# Patient Record
Sex: MALE | Race: BLACK OR AFRICAN AMERICAN | Employment: FULL TIME | URBAN - METROPOLITAN AREA
[De-identification: names, ages, dates, MRNs, and addresses within clinical notes are randomized per-mention and may not be internally consistent; named-entity substitution may affect disease eponyms.]

---

## 2017-01-17 ENCOUNTER — OP HISTORICAL/CONVERTED ENCOUNTER (OUTPATIENT)
Dept: OTHER | Age: 52
End: 2017-01-17

## 2017-03-21 ENCOUNTER — OFFICE VISIT (OUTPATIENT)
Dept: ENDOCRINOLOGY | Age: 52
End: 2017-03-21

## 2017-03-21 VITALS
HEART RATE: 110 BPM | BODY MASS INDEX: 26.08 KG/M2 | DIASTOLIC BLOOD PRESSURE: 83 MMHG | TEMPERATURE: 97.9 F | HEIGHT: 71 IN | WEIGHT: 186.3 LBS | RESPIRATION RATE: 16 BRPM | SYSTOLIC BLOOD PRESSURE: 123 MMHG

## 2017-03-21 DIAGNOSIS — E11.65 TYPE 2 DIABETES MELLITUS WITH HYPERGLYCEMIA, WITHOUT LONG-TERM CURRENT USE OF INSULIN (HCC): Primary | ICD-10-CM

## 2017-03-21 DIAGNOSIS — R00.0 TACHYCARDIA: ICD-10-CM

## 2017-03-21 LAB
GLUCOSE POC: 264 MG/DL
HBA1C MFR BLD HPLC: 9.1 %

## 2017-03-21 NOTE — PROGRESS NOTES
Farideh Leos is a 46 y.o. male here for   Chief Complaint   Patient presents with    Diabetes       Functional glucose monitor and record keeping system? - yes  Eye exam within last year? - yes   Foot exam within last year? - has appt in 2 weeks    Lab Results   Component Value Date/Time    Hemoglobin A1c, External 9.6 03/17/2016       Wt Readings from Last 3 Encounters:   11/04/16 187 lb (84.8 kg)   04/26/16 186 lb (84.4 kg)     Temp Readings from Last 3 Encounters:   11/04/16 97.2 °F (36.2 °C) (Oral)   04/26/16 97.5 °F (36.4 °C) (Oral)     BP Readings from Last 3 Encounters:   11/04/16 131/88   04/26/16 122/79     Pulse Readings from Last 3 Encounters:   11/04/16 99   04/26/16 90

## 2017-03-21 NOTE — PROGRESS NOTES
[facility]               Jeryl Buerger ,MD        [address] 78 [phone] Fax [phone]               Patient Information  Date:3/21/2017  Name : Belinda Fox 46 y.o.     YOB: 1965         PCP: Richard Wade MD         Chief Complaint   Patient presents with    Diabetes       History of Present Illness: Belinda Fox is a 46 y.o. male here for fu of  Type 2 Diabetes Mellitus. Self referred for evaluation and management of type 2 diabetes mellitus. He was diagnosed with diabetes in 2013,       Not checking BG   Stopped exercising   No weight gain           Wt Readings from Last 3 Encounters:   03/21/17 186 lb 4.8 oz (84.5 kg)   11/04/16 187 lb (84.8 kg)   04/26/16 186 lb (84.4 kg)       BP Readings from Last 3 Encounters:   03/21/17 123/83   11/04/16 131/88   04/26/16 122/79           Past Medical History:   Diagnosis Date    [facility]     Prostate cancer ([facility]     Type II diabetes mellitus, uncontrolled ([facility]      Current Outpatient Prescriptions   Medication Sig    metFORMIN (GLUCOPHAGE) 1,000 mg tablet Take 1 Tab by mouth two (2) times daily (with meals). Stop Glucovance    SITagliptin (JANUVIA) 100 mg tablet Take 1 Tab by mouth every morning.  efavirenz-emtrictabine-tenofovir (ATRIPLA) tablet Take 1 Tab by mouth nightly.  cinnamon bark 500 mg cap Take  by mouth.  B.infantis-B.ani-B.long-B.bifi 10-15 mg TbEC Take  by mouth.  Omega-3-DHA-EPA-Fish Oil 1,000 mg (120 mg-180 mg) cap Take  by mouth. No current facility-administered medications for this visit.       Allergies   Allergen Reactions    Azithromycin Myalgia    Sulfa (Sulfonamide Antibiotics) Nausea Only         Review of Systems:  - Constitutional Symptoms: no fevers, no chills, no weight loss  - Eyes: no blurry vision no double vision  - Cardiovascular: no chest pain ,no palpitations  - Respiratory: no cough no shortness of breath  -     Physical Examination:   Blood pressure 123/83, pulse (!) 110, temperature 97.9 °F (36.6 °C), temperature source Oral, resp. rate 16, height 5' 11\" (1.803 m), weight 186 lb 4.8 oz (84.5 kg). Estimated body mass index is 25.98 kg/(m^2) as calculated from the following:    Height as of this encounter: 5' 11\" (1.803 m). -   Weight as of this encounter: 186 lb 4.8 oz (84.5 kg). - General: pleasant, no distress, good eye contact  - HEENT: no pallor, no periorbital edema, EOMI  - Neck: supple,   - Cardiovascular: regular, normal rate, normal S1 and S2,   - Respiratory: clear to auscultation bilaterally  - Gastrointestinal: soft, nontender, nondistended,  BS +  - Neurological: alert and oriented  - Psychiatric: normal mood and affect  - Skin: color, texture, turgor normal.       Data Reviewed:     [] Glucose records reviewed. [] See glucose records for details (to be scanned). [] A1C  [] Reviewed labs    Cr nl     Assessment/Plan:     1. Type 2 diabetes mellitus with hyperglycemia, without long-term current use of insulin (HCC)        1. Type 2 Diabetes Mellitus with no nephropathy,neuropathy,retinopathy  Lab Results   Component Value Date/Time    Hemoglobin A1c (POC) 9.1 03/21/2017 03:54 PM    Hemoglobin A1c, External 9.6 03/17/2016     No signs of insulin resistance, no history of pancreatitis. Metformin 1000 mg twice daily, Januvia 100 mg.    added Jardiance         2. HIV - Followed at [facility]     3 Tachycardia - asymptomatic         There are no Patient Instructions on file for this visit. Follow-up Disposition: Not on File    Thank you for allowing me to participate in the care of this patient.     Renie Hatchet, MD      Patient verbalized understanding

## 2017-03-21 NOTE — MR AVS SNAPSHOT
Visit Information Date & Time Provider Department Dept. Phone Encounter #  
 3/21/2017  3:30 PM Lauren Mackey MD [facility] [phone] [id number] Follow-up Instructions Return in about 3 months (around 6/21/2017). Upcoming Health Maintenance Date Due Hepatitis C Screening 1965 FOOT EXAM Q1 4/7/1973 MICROALBUMIN Q1 4/7/1973 EYE EXAM RETINAL OR DILATED Q1 4/7/1973 Pneumococcal 19-64 Highest Risk (1 of 3 - PCV13) 4/7/1982 DTaP/Tdap/Td series (1 - Tdap) 4/7/1984 FOBT Q 1 YEAR AGE 50-75 4/7/2013 INFLUENZA AGE 9 TO ADULT 8/1/2016 HEMOGLOBIN A1C Q6M 9/17/2016 LIPID PANEL Q1 10/28/2017 Allergies as of 3/21/2017  Review Complete On: 3/21/2017 By: Lauren Mackey MD  
  
 Severity Noted Reaction Type Reactions Azithromycin  04/26/2016    Myalgia Sulfa (Sulfonamide Antibiotics)  04/26/2016    Nausea Only Current Immunizations  Never Reviewed No immunizations on file. Not reviewed this visit You Were Diagnosed With   
  
 Codes Comments Type 2 diabetes mellitus with hyperglycemia, without long-term current use of insulin (HCC)    -  Primary ICD-10-CM: E11.65 ICD-9-CM: 250.00, 790.29 Vitals BP Pulse Temp Resp Height(growth percentile) Weight(growth percentile) 123/83 (BP 1 Location: Right arm, BP Patient Position: Sitting) (!) 110 97.9 °F (36.6 °C) (Oral) 16 5' 11\" (1.803 m) 186 lb 4.8 oz (84.5 kg) BMI Smoking Status 25.98 kg/m2 Never Smoker BMI and BSA Data Body Mass Index Body Surface Area  
 25.98 kg/m 2 2.06 m 2 Preferred Pharmacy Pharmacy Name Phone [id number] [address] AT [place] [address] [phone] Your Updated Medication List  
  
   
This list is accurate as of: 3/21/17  4:14 PM.  Always use your most recent med list.  
  
  
  
  
 B.infantis-B.ani-B.long-B.bifi 10-15 mg Tbec Take  by mouth.  
  
 cinnamon bark 500 mg Cap Take  by mouth.  
  
 efavirenz-emtrictabine-tenofovir tablet Commonly known as:  ATRIPLA Take 1 Tab by mouth nightly. metFORMIN 1,000 mg tablet Commonly known as:  GLUCOPHAGE Take 1 Tab by mouth two (2) times daily (with meals). Stop Glucovance Omega-3-DHA-EPA-Fish Oil 1,000 mg (120 mg-180 mg) Cap Take  by mouth. SITagliptin 100 mg tablet Commonly known as:  Thirza Calkin Take 1 Tab by mouth every morning. We Performed the Following AMB POC GLUCOSE, QUANTITATIVE, BLOOD [24000 CPT(R)] AMB POC HEMOGLOBIN A1C [53462 CPT(R)] Follow-up Instructions Return in about 3 months (around 6/21/2017). Introducing \[facility] [place] [facility]\"" & HEALTH SERVICES! Víctor Lloyd introduces [employer] patient portal. Now you can access parts of your medical record, email your doctor's office, and request medication refills online. 1. In your internet browser, go to [email or web address] 2. Click on the First Time User? Click Here link in the Sign In box. You will see the New Member Sign Up page. 3. Enter your [employer] Access Code exactly as it appears below. You will not need to use this code after youve completed the sign-up process. If you do not sign up before the expiration date, you must request a new code. · Ele[employer] Access Code: [email or web address] Expires: 6/19/2017  4:14 PM 
 
4. Enter the last four digits of your Social Security Number (xxxx) and Date of Birth (mm/dd/yyyy) as indicated and click Submit. You will be taken to the next sign-up page. 5. Create a BeInSynct ID. This will be your Elecar login ID and cannot be changed, so think of one that is secure and easy to remember. 6. Create a [employer] password. You can change your password at any time. 7. Enter your Password Reset Question and Answer. This can be used at a later time if you forget your password. 8. Enter your e-mail address. You will receive e-mail notification when new information is available in [address] 9. Click Sign Up. You can now view and download portions of your medical record. 10. Click the Download Summary menu link to download a portable copy of your medical information. If you have questions, please visit the Frequently Asked Questions section of the [employer] website. Remember, [employer] is NOT to be used for urgent needs. For medical emergencies, dial 911. Now available from your iPhone and Android! Please provide this summary of care documentation to your next provider. Your primary care clinician is listed as Mercedes Don. If you have any questions after today's visit, please call [phone].

## 2017-04-22 ENCOUNTER — ED HISTORICAL/CONVERTED ENCOUNTER (OUTPATIENT)
Dept: OTHER | Age: 52
End: 2017-04-22

## 2017-05-04 DIAGNOSIS — E11.65 TYPE 2 DIABETES MELLITUS WITH HYPERGLYCEMIA, WITHOUT LONG-TERM CURRENT USE OF INSULIN (HCC): ICD-10-CM

## 2017-05-08 DIAGNOSIS — E11.65 UNCONTROLLED TYPE 2 DIABETES MELLITUS WITH HYPERGLYCEMIA, WITHOUT LONG-TERM CURRENT USE OF INSULIN (HCC): ICD-10-CM

## 2017-05-08 RX ORDER — METFORMIN HYDROCHLORIDE 1000 MG/1
1000 TABLET ORAL 2 TIMES DAILY WITH MEALS
Qty: 180 TAB | Refills: 3 | Status: SHIPPED | OUTPATIENT
Start: 2017-05-08 | End: 2018-02-12 | Stop reason: ALTCHOICE

## 2017-05-30 DIAGNOSIS — E11.65 TYPE 2 DIABETES MELLITUS WITH HYPERGLYCEMIA, WITHOUT LONG-TERM CURRENT USE OF INSULIN (HCC): ICD-10-CM

## 2017-06-20 ENCOUNTER — OP HISTORICAL/CONVERTED ENCOUNTER (OUTPATIENT)
Dept: OTHER | Age: 52
End: 2017-06-20

## 2017-06-20 LAB — CREATININE, EXTERNAL: 0.92

## 2017-06-23 ENCOUNTER — OFFICE VISIT (OUTPATIENT)
Dept: ENDOCRINOLOGY | Age: 52
End: 2017-06-23

## 2017-06-23 VITALS
WEIGHT: 181 LBS | DIASTOLIC BLOOD PRESSURE: 75 MMHG | RESPIRATION RATE: 16 BRPM | OXYGEN SATURATION: 98 % | HEART RATE: 89 BPM | HEIGHT: 71 IN | SYSTOLIC BLOOD PRESSURE: 114 MMHG | BODY MASS INDEX: 25.34 KG/M2

## 2017-06-23 DIAGNOSIS — I10 ESSENTIAL HYPERTENSION: ICD-10-CM

## 2017-06-23 DIAGNOSIS — B20 HIV (HUMAN IMMUNODEFICIENCY VIRUS INFECTION) (HCC): ICD-10-CM

## 2017-06-23 DIAGNOSIS — E11.65 TYPE 2 DIABETES MELLITUS WITH HYPERGLYCEMIA, WITHOUT LONG-TERM CURRENT USE OF INSULIN (HCC): Primary | ICD-10-CM

## 2017-06-23 NOTE — MR AVS SNAPSHOT
Visit Information Date & Time Provider Department Dept. Phone Encounter #  
 6/23/2017  3:30 PM Fariha Mgauire MD [facility] [phone] [id number] Follow-up Instructions Return in about 3 months (around 9/23/2017). Upcoming Health Maintenance Date Due Hepatitis C Screening 1965 FOOT EXAM Q1 4/7/1973 MICROALBUMIN Q1 4/7/1973 EYE EXAM RETINAL OR DILATED Q1 4/7/1973 Pneumococcal 19-64 Highest Risk (1 of 3 - PCV13) 4/7/1982 DTaP/Tdap/Td series (1 - Tdap) 4/7/1984 FOBT Q 1 YEAR AGE 50-75 4/7/2013 INFLUENZA AGE 9 TO ADULT 8/1/2017 HEMOGLOBIN A1C Q6M 9/21/2017 LIPID PANEL Q1 10/28/2017 Allergies as of 6/23/2017  Review Complete On: 6/23/2017 By: Fariha Maguire MD  
  
 Severity Noted Reaction Type Reactions Azithromycin  04/26/2016    Myalgia Sulfa (Sulfonamide Antibiotics)  04/26/2016    Nausea Only Current Immunizations  Never Reviewed No immunizations on file. Not reviewed this visit You Were Diagnosed With   
  
 Codes Comments Type 2 diabetes mellitus with hyperglycemia, without long-term current use of insulin (HCC)    -  Primary ICD-10-CM: E11.65 ICD-9-CM: 250.00, 790.29 Vitals Height(growth percentile) Weight(growth percentile) BMI Smoking Status 5' 11\" (1.803 m) 181 lb (82.1 kg) 25.24 kg/m2 Never Smoker BMI and BSA Data Body Mass Index Body Surface Area  
 25.24 kg/m 2 2.03 m 2 Preferred Pharmacy Pharmacy Name Phone [id number] [address] AT [place] [address] [phone] Your Updated Medication List  
  
   
This list is accurate as of: 6/23/17  4:30 PM.  Always use your most recent med list.  
  
  
  
  
 B.infantis-B.ani-B.long-B.bifi 10-15 mg Tbec Take  by mouth.  
  
 cinnamon bark 500 mg Cap Take  by mouth.  
  
 efavirenz-emtrictabine-tenofovir tablet Commonly known as:  ATRIPLA Take 1 Tab by mouth nightly. empagliflozin 25 mg tablet Commonly known as:  Josphine Alan Take 1 Tab by mouth daily. metFORMIN 1,000 mg tablet Commonly known as:  GLUCOPHAGE Take 1 Tab by mouth two (2) times daily (with meals). Stop Glucovance Omega-3-DHA-EPA-Fish Oil 1,000 mg (120 mg-180 mg) Cap Take  by mouth. SITagliptin 100 mg tablet Commonly known as:  Trina Montalvo Take 1 Tab by mouth every morning. Follow-up Instructions Return in about 3 months (around 9/23/2017). To-Do List   
 09/01/2017 Lab:  C-PEPTIDE   
  
 09/01/2017 Lab:  GLUTAMIC ACID DECARB AB   
  
 09/01/2017 Lab:  HEMOGLOBIN A1C WITH EAG   
  
 09/01/2017 Lab:  LDL, DIRECT   
  
 09/01/2017 Lab:  METABOLIC PANEL, BASIC Introducing [facility] & HEALTH SERVICES! [facility] introduces Any[employer]ots patient portal. Now you can access parts of your medical record, email your doctor's office, and request medication refills online. 1. In your internet browser, go to [email or web address] 2. Click on the First Time User? Click Here link in the Sign In box. You will see the New Member Sign Up page. 3. Enter your Any[employer]ots Access Code exactly as it appears below. You will not need to use this code after youve completed the sign-up process. If you do not sign up before the expiration date, you must request a new code. · Any[employer]ots Access Code: [email or web address] Expires: 9/21/2017  4:29 PM 
 
4. Enter the last four digits of your Social Security Number (xxxx) and Date of Birth (mm/dd/yyyy) as indicated and click Submit. You will be taken to the next sign-up page. 5. Create a [employer] ID. This will be your Anybots login ID and cannot be changed, so think of one that is secure and easy to remember. 6. Create a Anybots password. You can change your password at any time. 7. Enter your Password Reset Question and Answer.  This can be used at a later time if you forget your password. 8. Enter your e-mail address. You will receive e-mail notification when new information is available in [address] 9. Click Sign Up. You can now view and download portions of your medical record. 10. Click the Download Summary menu link to download a portable copy of your medical information. If you have questions, please visit the Frequently Asked Questions section of the [employer] website. Remember, [employer] is NOT to be used for urgent needs. For medical emergencies, dial 911. Now available from your iPhone and Android! Please provide this summary of care documentation to your next provider. If you have any questions after today's visit, please call [phone].

## 2017-06-23 NOTE — PROGRESS NOTES
Eye exam: within last year  Foot exam: within last year    Lab Results   Component Value Date/Time    Hemoglobin A1c (POC) 9.1 03/21/2017 03:54 PM    Hemoglobin A1c, External 9.6 03/17/2016     Wt Readings from Last 3 Encounters:   06/23/17 181 lb (82.1 kg)   03/21/17 186 lb 4.8 oz (84.5 kg)   11/04/16 187 lb (84.8 kg)     Temp Readings from Last 3 Encounters:   03/21/17 97.9 °F (36.6 °C) (Oral)   11/04/16 97.2 °F (36.2 °C) (Oral)   04/26/16 97.5 °F (36.4 °C) (Oral)     BP Readings from Last 3 Encounters:   06/23/17 114/75   03/21/17 123/83   11/04/16 131/88     Pulse Readings from Last 3 Encounters:   06/23/17 89   03/21/17 (!) 110   11/04/16 99     Order placed for pt,  per Verbal Order with read back from Dr Yogesh Tom 6/23/2017

## 2017-06-23 NOTE — PROGRESS NOTES
[facility]               Yasmin Melendez MD        [address] 78 [phone] Fax [phone]               Patient Information  Date:6/23/2017  Name : Rachel Persaud 45 y.o.     YOB: 1965         PCP: No primary care provider on file. Chief Complaint   Patient presents with    Diabetes       History of Present Illness: Rachel Persaud is a 45 y.o. male here for fu of  Type 2 Diabetes Mellitus. Self referred for evaluation and management of type 2 diabetes mellitus. He was diagnosed with diabetes in 2013,   no log   Jardiance has helped     Not checking BG   Stopped exercising   Lost weight         Wt Readings from Last 3 Encounters:   06/23/17 181 lb (82.1 kg)   03/21/17 186 lb 4.8 oz (84.5 kg)   11/04/16 187 lb (84.8 kg)       BP Readings from Last 3 Encounters:   06/23/17 114/75   03/21/17 123/83   11/04/16 131/88           Past Medical History:   Diagnosis Date    [facility]human [facility] virus infection) ([facility]     Prostate cancer ([facility]     Type II diabetes mellitus, uncontrolled ([facility]      Current Outpatient Prescriptions   Medication Sig    empagliflozin (JARDIANCE) 25 mg tablet Take 1 Tab by mouth daily.  metFORMIN (GLUCOPHAGE) 1,000 mg tablet Take 1 Tab by mouth two (2) times daily (with meals). Stop Glucovance    SITagliptin (JANUVIA) 100 mg tablet Take 1 Tab by mouth every morning.  efavirenz-emtrictabine-tenofovir (ATRIPLA) tablet Take 1 Tab by mouth nightly.  cinnamon bark 500 mg cap Take  by mouth.  B.infantis-B.ani-B.long-B.bifi 10-15 mg TbEC Take  by mouth.  Omega-3-DHA-EPA-Fish Oil 1,000 mg (120 mg-180 mg) cap Take  by mouth. No current facility-administered medications for this visit.       Allergies   Allergen Reactions    Azithromycin Myalgia    Sulfa (Sulfonamide Antibiotics) Nausea Only         Review of Systems:  - Constitutional Symptoms: no fevers, no chills, no weight loss  - Eyes: no blurry vision no double vision  - Cardiovascular: no chest pain ,no palpitations  - Respiratory: no cough no shortness of breath  -     Physical Examination:   Blood pressure 114/75, pulse 89, resp. rate 16, height 5' 11\" (1.803 m), weight 181 lb (82.1 kg), SpO2 98 %. Estimated body mass index is 25.24 kg/(m^2) as calculated from the following:    Height as of this encounter: 5' 11\" (1.803 m). -   Weight as of this encounter: 181 lb (82.1 kg). - General: pleasant, no distress, good eye contact  - HEENT: no pallor, no periorbital edema, EOMI  - Neck: supple,   - Cardiovascular: regular, normal rate, normal S1 and S2,   - Respiratory: clear to auscultation bilaterally  - Gastrointestinal: soft, nontender, nondistended,  BS +  - Neurological: alert and oriented  - Psychiatric: normal mood and affect  - Skin: color, texture, turgor normal.       Data Reviewed:     [] Glucose records reviewed. [] See glucose records for details (to be scanned). [] A1C  [] Reviewed labs    Cr nl     Assessment/Plan:     1. Type 2 diabetes mellitus with hyperglycemia, without long-term current use of insulin ([facility]    2. [facility]    3. Essential hypertension        1. Type 2 Diabetes Mellitus with no nephropathy,neuropathy,retinopathy  Lab Results   Component Value Date/Time    Hemoglobin A1c (POC) 9.1 03/21/2017 03:54 PM    Hemoglobin A1c, External 9.6 03/17/2016     No signs of insulin resistance, no history of pancreatitis. Metformin 1000 mg twice daily, Januvia 100 mg.    added Jardiance         2. HIV - Followed at [facility]     3 HTN - controlled         There are no Patient Instructions on file for this visit. Follow-up Disposition:  Return in about 3 months (around 9/23/2017). Thank you for allowing me to participate in the care of this patient.     Alexis Bee MD      Patient verbalized understanding

## 2017-08-25 ENCOUNTER — OP HISTORICAL/CONVERTED ENCOUNTER (OUTPATIENT)
Dept: OTHER | Age: 52
End: 2017-08-25

## 2017-09-13 ENCOUNTER — OP HISTORICAL/CONVERTED ENCOUNTER (OUTPATIENT)
Dept: OTHER | Age: 52
End: 2017-09-13

## 2017-09-18 ENCOUNTER — IP HISTORICAL/CONVERTED ENCOUNTER (OUTPATIENT)
Dept: OTHER | Age: 52
End: 2017-09-18

## 2017-09-29 ENCOUNTER — IP HISTORICAL/CONVERTED ENCOUNTER (OUTPATIENT)
Dept: OTHER | Age: 52
End: 2017-09-29

## 2017-10-10 ENCOUNTER — OP HISTORICAL/CONVERTED ENCOUNTER (OUTPATIENT)
Dept: OTHER | Age: 52
End: 2017-10-10

## 2017-10-10 ENCOUNTER — OFFICE VISIT (OUTPATIENT)
Dept: ENDOCRINOLOGY | Age: 52
End: 2017-10-10

## 2017-10-10 VITALS
SYSTOLIC BLOOD PRESSURE: 112 MMHG | TEMPERATURE: 97 F | DIASTOLIC BLOOD PRESSURE: 78 MMHG | WEIGHT: 172.2 LBS | OXYGEN SATURATION: 97 % | HEART RATE: 116 BPM | BODY MASS INDEX: 24.11 KG/M2 | RESPIRATION RATE: 16 BRPM | HEIGHT: 71 IN

## 2017-10-10 DIAGNOSIS — E11.65 TYPE 2 DIABETES MELLITUS WITH HYPERGLYCEMIA, WITHOUT LONG-TERM CURRENT USE OF INSULIN (HCC): Primary | ICD-10-CM

## 2017-10-10 DIAGNOSIS — E78.5 DYSLIPIDEMIA: ICD-10-CM

## 2017-10-10 DIAGNOSIS — I10 ESSENTIAL HYPERTENSION: ICD-10-CM

## 2017-10-10 LAB
GLUCOSE POC: 156 MG/DL
HBA1C MFR BLD HPLC: 7.1 %

## 2017-10-10 RX ORDER — ATORVASTATIN CALCIUM 10 MG/1
TABLET, FILM COATED ORAL
Refills: 5 | COMMUNITY
Start: 2017-09-07 | End: 2017-10-13 | Stop reason: DRUGHIGH

## 2017-10-10 RX ORDER — ASPIRIN 325 MG
81 TABLET ORAL DAILY
COMMUNITY

## 2017-10-10 NOTE — PROGRESS NOTES
[facility]               Caitlyn Pina MD        [address] [phone] Fax [phone]               Patient Information  Date:10/10/2017  Name : Uriel Vickers 45 y.o.     YOB: 1965         PCP: Marcelino Souza MD         Chief Complaint   Patient presents with    Diabetes       History of Present Illness: Uriel Vickers is a 45 y.o. male here for fu of  Type 2 Diabetes Mellitus. Self referred for evaluation and management of type 2 diabetes mellitus. He was diagnosed with diabetes in 2013,   no log     Lost weight - has stopped Najera on his own   Ancil Aj has helped             Wt Readings from Last 3 Encounters:   10/10/17 172 lb 3.2 oz (78.1 kg)   06/23/17 181 lb (82.1 kg)   03/21/17 186 lb 4.8 oz (84.5 kg)       BP Readings from Last 3 Encounters:   10/10/17 112/78   06/23/17 114/75   03/21/17 123/83           Past Medical History:   Diagnosis Date    [facility]) [facility]     Prostate cancer ([facility]     Type II diabetes mellitus, uncontrolled ([facility]      Current Outpatient Prescriptions   Medication Sig    atorvastatin (LIPITOR) 10 mg tablet TAKE 1 TABLET BY MOUTH EVERY DAY    aspirin (ASPIRIN) 325 mg tablet Take 325 mg by mouth daily.  ertapenem [facility]) 1 gram solr injection 1 g by IntraMUSCular route every twenty-four (24) hours.  empagliflozin (JARDIANCE) 25 mg tablet Take 1 Tab by mouth daily.  metFORMIN (GLUCOPHAGE) 1,000 mg tablet Take 1 Tab by mouth two (2) times daily (with meals). Stop Glucovance    efavirenz-emtrictabine-tenofovir (ATRIPLA) tablet Take 1 Tab by mouth nightly.  SITagliptin (JANUVIA) 100 mg tablet Take 1 Tab by mouth every morning.  cinnamon bark 500 mg cap Take  by mouth.  B.infantis-B.ani-B.long-B.bifi 10-15 mg TbEC Take  by mouth.  Omega-3-DHA-EPA-Fish Oil 1,000 mg (120 mg-180 mg) cap Take  by mouth.      No current facility-administered medications for this visit. Allergies   Allergen Reactions    Azithromycin Myalgia    Sulfa (Sulfonamide Antibiotics) Nausea Only         Review of Systems:  - Constitutional Symptoms: no fevers, no chills, no weight loss  - Eyes: no blurry vision no double vision  - Cardiovascular: no chest pain ,no palpitations  - Respiratory: no cough no shortness of breath  -     Physical Examination:   Blood pressure 112/78, pulse (!) 116, temperature 97 °F (36.1 °C), temperature source Oral, resp. rate 16, height 5' 11\" (1.803 m), weight 172 lb 3.2 oz (78.1 kg), SpO2 97 %. Estimated body mass index is 24.02 kg/(m^2) as calculated from the following:    Height as of this encounter: 5' 11\" (1.803 m). -   Weight as of this encounter: 172 lb 3.2 oz (78.1 kg). - General: pleasant, no distress, good eye contact  - HEENT: no pallor, no periorbital edema, EOMI  - Neck: supple,   - Cardiovascular: regular, normal rate, normal S1 and S2,   - Respiratory: clear to auscultation bilaterally  - Gastrointestinal: soft, nontender, nondistended,  BS +  - Neurological: alert and oriented  - Psychiatric: normal mood and affect  - Skin: color, texture, turgor normal.       Data Reviewed:     [] Glucose records reviewed. [] See glucose records for details (to be scanned). [] A1C  [] Reviewed labs    Cr nl     Assessment/Plan:     1. Type 2 diabetes mellitus with hyperglycemia, without long-term current use of insulin ([facility])        1. Type 2 Diabetes Mellitus with no nephropathy,neuropathy,retinopathy  Lab Results   Component Value Date/Time    Hemoglobin A1c (POC) 7.1 10/10/2017 04:34 PM    Hemoglobin A1c, External 9.6 03/17/2016     improved  No signs of insulin resistance, no history of pancreatitis. Metformin 1000 mg twice daily, Januvia 100 mg.  Jardiance         2.   HIV - Followed at [facility]     3 HTN - controlled     4 Hyperlipidemia - orderd lipids again   With HIV meds need  TG also         There are no Patient Instructions on file for this visit. Follow-up Disposition:  Return in about 4 months (around 2/10/2018). Thank you for allowing me to participate in the care of this patient.     Dejon Jimenez MD      Patient verbalized understanding

## 2017-10-10 NOTE — MR AVS SNAPSHOT
Visit Information Date & Time Provider Department Dept. Phone Encounter #  
 10/10/2017  3:45 PM Ange Clarke MD [facility] [phone] [id number] Follow-up Instructions Return in about 4 months (around 2/10/2018). Upcoming Health Maintenance Date Due Hepatitis C Screening 1965 FOOT EXAM Q1 4/7/1973 EYE EXAM RETINAL OR DILATED Q1 4/7/1973 Pneumococcal 19-64 Highest Risk (1 of 3 - PCV13) 4/7/1982 DTaP/Tdap/Td series (1 - Tdap) 4/7/1984 FOBT Q 1 YEAR AGE 50-75 4/7/2013 INFLUENZA AGE 9 TO ADULT 8/1/2017 LIPID PANEL Q1 10/28/2017 HEMOGLOBIN A1C Q6M 12/20/2017 MICROALBUMIN Q1 6/20/2018 Allergies as of 10/10/2017  Review Complete On: 10/10/2017 By: Ange Clarke MD  
  
 Severity Noted Reaction Type Reactions Azithromycin  04/26/2016    Myalgia Sulfa (Sulfonamide Antibiotics)  04/26/2016    Nausea Only Current Immunizations  Never Reviewed No immunizations on file. Not reviewed this visit You Were Diagnosed With   
  
 Codes Comments Type 2 diabetes mellitus with hyperglycemia, without long-term current use of insulin (HCC)    -  Primary ICD-10-CM: E11.65 ICD-9-CM: 250.00, 790.29 Vitals BP Pulse Temp Resp Height(growth percentile) Weight(growth percentile) 112/78 (BP 1 Location: Left arm, BP Patient Position: Sitting) (!) 116 97 °F (36.1 °C) (Oral) 16 5' 11\" (1.803 m) 172 lb 3.2 oz (78.1 kg) SpO2 BMI Smoking Status 97% 24.02 kg/m2 Never Smoker Vitals History BMI and BSA Data Body Mass Index Body Surface Area 24.02 kg/m 2 1.98 m 2 Preferred Pharmacy Pharmacy Name Phone [id number] [address] AT [place] [address] [phone] Your Updated Medication List  
  
   
This list is accurate as of: 10/10/17  4:47 PM.  Always use your most recent med list.  
  
  
  
  
 aspirin 325 mg tablet Commonly known as:  ASPIRIN Take 325 mg by mouth daily. atorvastatin 10 mg tablet Commonly known as:  LIPITOR  
TAKE 1 TABLET BY MOUTH EVERY DAY  
  
 B.infantis-B.ani-B.long-B.bifi 10-15 mg Tbec Take  by mouth.  
  
 cinnamon bark 500 mg Cap Take  by mouth.  
  
 efavirenz-emtrictabine-tenofovir tablet Commonly known as:  ATRIPLA Take 1 Tab by mouth nightly. empagliflozin 25 mg tablet Commonly known as:  Lonzie Peals Take 1 Tab by mouth daily. ertapenem 1 gram Solr injection Commonly known as:  INVANZ  
1 g by IntraMUSCular route every twenty-four (24) hours. metFORMIN 1,000 mg tablet Commonly known as:  GLUCOPHAGE Take 1 Tab by mouth two (2) times daily (with meals). Stop Glucovance Omega-3-DHA-EPA-Fish Oil 1,000 mg (120 mg-180 mg) Cap Take  by mouth. SITagliptin 100 mg tablet Commonly known as:  Mccoy Donovan Take 1 Tab by mouth every morning. We Performed the Following AMB POC GLUCOSE, QUANTITATIVE, BLOOD [19380 CPT(R)] AMB POC HEMOGLOBIN A1C [89596 CPT(R)] Follow-up Instructions Return in about 4 months (around 2/10/2018). Introducing [facility]! [employer] introduces [employer] patient portal. Now you can access parts of your medical record, email your doctor's office, and request medication refills online. 1. In your internet browser, go to [email or web address] 2. Click on the First Time User? Click Here link in the Sign In box. You will see the New Member Sign Up page. 3. Enter your Gr[employer] Access Code exactly as it appears below. You will not need to use this code after youve completed the sign-up process. If you do not sign up before the expiration date, you must request a new code. · Grata Access Code: [email or web address] Expires: 1/8/2018  4:47 PM 
 
4.  Enter the last four digits of your Social Security Number (xxxx) and Date of Birth (mm/dd/yyyy) as indicated and click Submit. You will be taken to the next sign-up page. 5. Create a [employer] ID. This will be your [employer] login ID and cannot be changed, so think of one that is secure and easy to remember. 6. Create a [employer] password. You can change your password at any time. 7. Enter your Password Reset Question and Answer. This can be used at a later time if you forget your password. 8. Enter your e-mail address. You will receive e-mail notification when new information is available in [address] 9. Click Sign Up. You can now view and download portions of your medical record. 10. Click the Download Summary menu link to download a portable copy of your medical information. If you have questions, please visit the Frequently Asked Questions section of the [employer] website. Remember, [employer] is NOT to be used for urgent needs. For medical emergencies, dial 911. Now available from your iPhone and Android! Please provide this summary of care documentation to your next provider. Your primary care clinician is listed as Leticia Me. If you have any questions after today's visit, please call [phone].

## 2017-10-11 ENCOUNTER — OP HISTORICAL/CONVERTED ENCOUNTER (OUTPATIENT)
Dept: OTHER | Age: 52
End: 2017-10-11

## 2017-10-13 ENCOUNTER — TELEPHONE (OUTPATIENT)
Dept: ENDOCRINOLOGY | Age: 52
End: 2017-10-13

## 2017-10-13 DIAGNOSIS — E78.2 MIXED HYPERLIPIDEMIA: Primary | ICD-10-CM

## 2017-10-13 RX ORDER — ATORVASTATIN CALCIUM 20 MG/1
20 TABLET, FILM COATED ORAL DAILY
Qty: 90 TAB | Refills: 3 | Status: SHIPPED | OUTPATIENT
Start: 2017-10-13 | End: 2018-10-27 | Stop reason: SDUPTHER

## 2017-10-13 NOTE — TELEPHONE ENCOUNTER
Per Dr. Quincy Pat informed pt of the following \"cholesterol slightly elevated. Increase Lipitor to 20mg\" Pt verbalized understanding with no further questions or concerns at this time.

## 2017-10-17 ENCOUNTER — OP HISTORICAL/CONVERTED ENCOUNTER (OUTPATIENT)
Dept: OTHER | Age: 52
End: 2017-10-17

## 2017-11-06 DIAGNOSIS — E11.65 TYPE 2 DIABETES MELLITUS WITH HYPERGLYCEMIA, WITHOUT LONG-TERM CURRENT USE OF INSULIN (HCC): ICD-10-CM

## 2017-12-09 ENCOUNTER — ED HISTORICAL/CONVERTED ENCOUNTER (OUTPATIENT)
Dept: OTHER | Age: 52
End: 2017-12-09

## 2017-12-27 NOTE — PROGRESS NOTES
Serena Bishop is a 45 y.o. male here for   Chief Complaint   Patient presents with    Diabetes       Functional glucose monitor and record keeping system? - yes  Eye exam within last year? - yes  Foot exam within last year? - April 2017    1. Have you been to the ER, urgent care clinic since your last visit? Hospitalized since your last visit? -[facility] 9/29/17- 10/5/17 prostate bx and sepsis     2. Have you seen or consulted any other health care providers outside of the [address] [place] [address] since your last visit? Include any pap smears or colon screening. -PCP      Lab Results   Component Value Date/Time    Hemoglobin A1c (POC) 9.1 03/21/2017 03:54 PM    Hemoglobin A1c, External 9.6 03/17/2016       Wt Readings from Last 3 Encounters:   06/23/17 181 lb (82.1 kg)   03/21/17 186 lb 4.8 oz (84.5 kg)   11/04/16 187 lb (84.8 kg)     Temp Readings from Last 3 Encounters:   03/21/17 97.9 °F (36.6 °C) (Oral)   11/04/16 97.2 °F (36.2 °C) (Oral)   04/26/16 97.5 °F (36.4 °C) (Oral)     BP Readings from Last 3 Encounters:   06/23/17 114/75   03/21/17 123/83   11/04/16 131/88     Pulse Readings from Last 3 Encounters:   06/23/17 89   03/21/17 (!) 110   11/04/16 99       Order placed for pt per verbal order with read back from Dr. Narda Cranker 10/10/17 Migraines

## 2018-02-12 ENCOUNTER — OFFICE VISIT (OUTPATIENT)
Dept: ENDOCRINOLOGY | Age: 53
End: 2018-02-12

## 2018-02-12 VITALS — HEIGHT: 71 IN

## 2018-02-12 DIAGNOSIS — E78.5 DYSLIPIDEMIA: ICD-10-CM

## 2018-02-12 DIAGNOSIS — E11.65 TYPE 2 DIABETES MELLITUS WITH HYPERGLYCEMIA, WITHOUT LONG-TERM CURRENT USE OF INSULIN (HCC): ICD-10-CM

## 2018-02-12 DIAGNOSIS — E11.65 TYPE 2 DIABETES MELLITUS WITH HYPERGLYCEMIA, WITHOUT LONG-TERM CURRENT USE OF INSULIN (HCC): Primary | ICD-10-CM

## 2018-02-12 DIAGNOSIS — I10 ESSENTIAL HYPERTENSION: ICD-10-CM

## 2018-02-12 LAB
GLUCOSE POC: 211 MG/DL
HBA1C MFR BLD HPLC: 7.9 %

## 2018-02-12 NOTE — PROGRESS NOTES
[facility]               Yuliana Peace MD        [address] [phone] Fax [phone]               Patient Information  Date:2/13/2018  Name : Fred Curry 45 y.o.     YOB: 1965         PCP: Sri Koch MD         Chief Complaint   Patient presents with    Diabetes    Cholesterol Problem    Hypertension       History of Present Illness: Fred Curry is a 45 y.o. male here for fu of  Type 2 Diabetes Mellitus. Self referred for evaluation and management of type 2 diabetes mellitus. He was hospitalized for sepsis, now recovering  He is taking the medications consistently  He was diagnosed with diabetes in 2013,  He did not bring the log    Lost weight -   Bert Gray has helped             Wt Readings from Last 3 Encounters:   10/10/17 172 lb 3.2 oz (78.1 kg)   06/23/17 181 lb (82.1 kg)   03/21/17 186 lb 4.8 oz (84.5 kg)       BP Readings from Last 3 Encounters:   10/10/17 112/78   06/23/17 114/75   03/21/17 123/83           Past Medical History:   Diagnosis Date    [facility]     Prostate cancer [facility]     Type II diabetes mellitus, uncontrolled ([facility]      Current Outpatient Prescriptions   Medication Sig    atorvastatin (LIPITOR) 20 mg tablet Take 1 Tab by mouth daily.  aspirin (ASPIRIN) 325 mg tablet Take 325 mg by mouth daily.  efavirenz-emtrictabine-tenofovir (ATRIPLA) tablet Take 1 Tab by mouth nightly.  SITagliptin-metFORMIN (JANUMET XR) 50-1,000 mg TM24 Take 1 Tab by mouth two (2) times daily (with meals). Stop Januvia and Metformin    empagliflozin (JARDIANCE) 25 mg tablet Take 1 Tab by mouth daily.  ertapenem [facility]) 1 gram solr injection 1 g by IntraMUSCular route every twenty-four (24) hours.  cinnamon bark 500 mg cap Take  by mouth.  B.infantis-B.ani-B.long-B.bifi 10-15 mg TbEC Take  by mouth.     Omega-3-DHA-EPA-Fish Oil 1,000 mg (120 mg-180 mg) cap Take  by mouth. No current facility-administered medications for this visit. Allergies   Allergen Reactions    Azithromycin Myalgia    Sulfa (Sulfonamide Antibiotics) Nausea Only         Review of Systems:  - Constitutional Symptoms: no fevers, no chills, no weight loss  - Eyes: no blurry vision no double vision  - Cardiovascular: no chest pain ,no palpitations  - Respiratory: no cough no shortness of breath  -     Physical Examination:   Height 5' 11\" (1.803 m). Estimated body mass index is 24.02 kg/(m^2) as calculated from the following:    Height as of 10/10/17: 5' 11\" (1.803 m). -   Weight as of 10/10/17: 172 lb 3.2 oz (78.1 kg). - General: pleasant, no distress, good eye contact  - HEENT: no pallor, no periorbital edema, EOMI  - Neck: supple,   - Cardiovascular: regular, normal rate, normal S1 and S2,   - Respiratory: clear to auscultation bilaterally  - Gastrointestinal: soft, nontender, nondistended,  BS +  - Neurological: alert and oriented  - Psychiatric: normal mood and affect  - Skin: color, texture, turgor normal.     Diabetic foot exam: February 2018    Left:     Vibratory sensation normal    Filament test normal sensation with micro filament   Pulse DP: 1+    Deformities: None  Right:    Vibratory sensation normal   Filament test normal sensation with micro filament   Pulse DP: 1+   Deformities: None    Data Reviewed:     [] Glucose records reviewed. [] See glucose records for details (to be scanned). [] A1C  [] Reviewed labs    Cr nl     Assessment/Plan:     1. Type 2 diabetes mellitus with hyperglycemia, without long-term current use of insulin ([address]    2. Dyslipidemia    3. Essential hypertension        1.  Type 2 Diabetes Mellitus with no nephropathy,neuropathy,retinopathy  Lab Results   Component Value Date/Time    Hemoglobin A1c (POC) 7.9 02/12/2018 04:11 PM    Hemoglobin A1c, External 9.6 03/17/2016     Not at goal, stable  No signs of insulin resistance, no history of pancreatitis. Metformin 1000 mg twice daily, Januvia 100 mg.  Jardiance           2. HIV - Followed at [facility]     3 HTN -    4 Hyperlipidemia -   Statin      There are no Patient Instructions on file for this visit. Follow-up Disposition:  Return in about 4 months (around 6/12/2018). Thank you for allowing me to participate in the care of this patient.     Corinne Gent, MD      Patient verbalized understanding

## 2018-02-12 NOTE — PROGRESS NOTES
Sheri Simeon is a 45 y.o. male here for   Chief Complaint   Patient presents with    Diabetes    Cholesterol Problem    Hypertension       Functional glucose monitor and record keeping system? -   Eye exam within last year? -     1. Have you been to the ER, urgent care clinic since your last visit? Hospitalized since your last visit? -[facility] early Nov 2017 for bacterial infection    2. Have you seen or consulted any other health care providers outside of the [address] [place] [address] since your last visit? Include any pap smears or colon screening. -PCP      Lab Results   Component Value Date/Time    Hemoglobin A1c (POC) 7.1 10/10/2017 04:34 PM    Hemoglobin A1c, External 9.6 03/17/2016       Wt Readings from Last 3 Encounters:   10/10/17 172 lb 3.2 oz (78.1 kg)   06/23/17 181 lb (82.1 kg)   03/21/17 186 lb 4.8 oz (84.5 kg)     Temp Readings from Last 3 Encounters:   10/10/17 97 °F (36.1 °C) (Oral)   03/21/17 97.9 °F (36.6 °C) (Oral)   11/04/16 97.2 °F (36.2 °C) (Oral)     BP Readings from Last 3 Encounters:   10/10/17 112/78   06/23/17 114/75   03/21/17 123/83     Pulse Readings from Last 3 Encounters:   10/10/17 (!) 116   06/23/17 89   03/21/17 (!) 110

## 2018-02-12 NOTE — MR AVS SNAPSHOT
[address] Pending sale to [facility] [address] 
[phone] Patient: Justine Harden MRN: [id number] LCN:[id number] Visit Information Date & Time Provider Department Dept. Phone Encounter #  
 2/12/2018  3:45 PM Audie Smith MD [facility] Endocr[facility] [phone] [id number] Follow-up Instructions Return in about 4 months (around 6/12/2018). Upcoming Health Maintenance Date Due Hepatitis C Screening 1965 FOOT EXAM Q1 4/7/1973 EYE EXAM RETINAL OR DILATED Q1 4/7/1973 Pneumococcal 19-64 Highest Risk (1 of 3 - PCV13) 4/7/1982 DTaP/Tdap/Td series (1 - Tdap) 4/7/1984 FOBT Q 1 YEAR AGE 50-75 4/7/2013 Influenza Age 5 to Adult 8/1/2017 LIPID PANEL Q1 10/28/2017 HEMOGLOBIN A1C Q6M 4/10/2018 MICROALBUMIN Q1 6/20/2018 Allergies as of 2/12/2018  Review Complete On: 2/12/2018 By: Audie Smith MD  
  
 Severity Noted Reaction Type Reactions Azithromycin  04/26/2016    Myalgia Sulfa (Sulfonamide Antibiotics)  04/26/2016    Nausea Only Current Immunizations  Never Reviewed No immunizations on file. Not reviewed this visit You Were Diagnosed With   
  
 Codes Comments Type 2 diabetes mellitus with hyperglycemia, without long-term current use of insulin (HCC)    -  Primary ICD-10-CM: E11.65 ICD-9-CM: 250.00, 790.29 Dyslipidemia     ICD-10-CM: E78.5 ICD-9-CM: 272.4 Essential hypertension     ICD-10-CM: I10 
ICD-9-CM: 401.9 Vitals Height(growth percentile) Smoking Status 5' 11\" (1.803 m) Never Smoker Preferred Pharmacy Pharmacy Name Phone [id number] [address] AT [place] [address] [phone] Your Updated Medication List  
  
   
This list is accurate as of: 2/12/18  4:47 PM.  Always use your most recent med list.  
  
  
  
  
 aspirin 325 mg tablet Commonly known as:  ASPIRIN Take 325 mg by mouth daily. atorvastatin 20 mg tablet Commonly known as:  LIPITOR Take 1 Tab by mouth daily. B.infantis-B.ani-B.long-B.bifi 10-15 mg Tbec Take  by mouth.  
  
 cinnamon bark 500 mg Cap Take  by mouth.  
  
 efavirenz-emtrictabine-tenofovir tablet Commonly known as:  ATRIPLA Take 1 Tab by mouth nightly. empagliflozin 25 mg tablet Commonly known as:  Unicoi Moulds Take 1 Tab by mouth daily. ertapenem 1 gram Solr injection Commonly known as:  INVANZ  
1 g by IntraMUSCular route every twenty-four (24) hours. metFORMIN 1,000 mg tablet Commonly known as:  GLUCOPHAGE Take 1 Tab by mouth two (2) times daily (with meals). Stop Glucovance Omega-3-DHA-EPA-Fish Oil 1,000 mg (120 mg-180 mg) Cap Take  by mouth. SITagliptin 100 mg tablet Commonly known as:  Eitan Halon Take 1 Tab by mouth every morning. We Performed the Following AMB POC GLUCOSE, QUANTITATIVE, BLOOD [00642 CPT(R)] AMB POC HEMOGLOBIN A1C [10813 CPT(R)] Follow-up Instructions Return in about 4 months (around 6/12/2018). To-Do List   
 04/02/2018 Lab:  HEMOGLOBIN A1C WITH EAG   
  
 04/02/2018 Lab:  LIPID PANEL   
  
 04/02/2018 Lab:  METABOLIC PANEL, COMPREHENSIVE   
  
 04/02/2018 Lab:  MICROALBUMIN, UR, RAND W/ MICROALBUMIN/CREA RATIO Introducing [facility]! [facility] introduces [employer] patient portal. Now you can access parts of your medical record, email your doctor's office, and request medication refills online. 1. In your internet browser, go to [email or web address] 2. Click on the First Time User? Click Here link in the Sign In box. You will see the New Member Sign Up page. 3. Enter your [employer] Access Code exactly as it appears below. You will not need to use this code after youve completed the sign-up process.  If you do not sign up before the expiration date, you must request a new code. · [employer] Access Code: [email or web address] Expires: 5/13/2018  4:47 PM 
 
4. Enter the last four digits of your Social Security Number (xxxx) and Date of Birth (mm/dd/yyyy) as indicated and click Submit. You will be taken to the next sign-up page. 5. Create a [employer] ID. This will be your [employer] login ID and cannot be changed, so think of one that is secure and easy to remember. 6. Create a [employer] password. You can change your password at any time. 7. Enter your Password Reset Question and Answer. This can be used at a later time if you forget your password. 8. Enter your e-mail address. You will receive e-mail notification when new information is available in [address] 9. Click Sign Up. You can now view and download portions of your medical record. 10. Click the Download Summary menu link to download a portable copy of your medical information. If you have questions, please visit the Frequently Asked Questions section of the [employer] website. Remember, [employer] is NOT to be used for urgent needs. For medical emergencies, dial 911. Now available from your iPhone and Android! Please provide this summary of care documentation to your next provider. Your primary care clinician is listed as Bronwyn Beverly. If you have any questions after today's visit, please call [phone].

## 2018-02-20 ENCOUNTER — OP HISTORICAL/CONVERTED ENCOUNTER (OUTPATIENT)
Dept: OTHER | Age: 53
End: 2018-02-20

## 2018-02-26 ENCOUNTER — TELEPHONE (OUTPATIENT)
Dept: ENDOCRINOLOGY | Age: 53
End: 2018-02-26

## 2018-02-26 NOTE — TELEPHONE ENCOUNTER
Attempted to call. Unsuccessful. Left msg for Corky Trujillo to give us a call back at the office. A callback number was left. Per Dr. Paul Hayward \" Sugar control is not at goal. A1C is 8.6%.  Cholesterol is also high ()\"

## 2018-03-01 NOTE — TELEPHONE ENCOUNTER
Informed pt of Dr. Michael Saba note. Pt verbalized understanding with no further questions or concerns at this time.

## 2018-03-05 DIAGNOSIS — E11.65 TYPE 2 DIABETES MELLITUS WITH HYPERGLYCEMIA, WITHOUT LONG-TERM CURRENT USE OF INSULIN (HCC): ICD-10-CM

## 2018-05-10 LAB
CREATININE, EXTERNAL: 0.86
LDL-C, EXTERNAL: 109

## 2018-05-22 ENCOUNTER — TELEPHONE (OUTPATIENT)
Dept: ENDOCRINOLOGY | Age: 53
End: 2018-05-22

## 2018-05-22 NOTE — TELEPHONE ENCOUNTER
[employer] called for an update on Jardiance to see if they can help get it covered ref#[id number] phone number is [phone].   Thank you

## 2018-05-30 ENCOUNTER — TELEPHONE (OUTPATIENT)
Dept: ENDOCRINOLOGY | Age: 53
End: 2018-05-30

## 2018-05-30 NOTE — TELEPHONE ENCOUNTER
----- Message from Vilma Bee sent at 5/29/2018  1:08 PM EDT -----  Regarding: Dr. Inder Velazco with [employer],is calling regarding prior auth for \"jardiance\". Best contact is [phone].

## 2018-06-13 ENCOUNTER — OFFICE VISIT (OUTPATIENT)
Dept: ENDOCRINOLOGY | Age: 53
End: 2018-06-13

## 2018-06-13 VITALS
WEIGHT: 186.9 LBS | TEMPERATURE: 97.7 F | HEART RATE: 105 BPM | DIASTOLIC BLOOD PRESSURE: 83 MMHG | BODY MASS INDEX: 26.17 KG/M2 | RESPIRATION RATE: 16 BRPM | OXYGEN SATURATION: 96 % | SYSTOLIC BLOOD PRESSURE: 123 MMHG | HEIGHT: 71 IN

## 2018-06-13 DIAGNOSIS — E78.5 DYSLIPIDEMIA: ICD-10-CM

## 2018-06-13 DIAGNOSIS — E11.65 TYPE 2 DIABETES MELLITUS WITH HYPERGLYCEMIA, WITHOUT LONG-TERM CURRENT USE OF INSULIN (HCC): Primary | ICD-10-CM

## 2018-06-13 DIAGNOSIS — I10 ESSENTIAL HYPERTENSION: ICD-10-CM

## 2018-06-13 LAB
GLUCOSE POC: 233 MG/DL
HBA1C MFR BLD HPLC: 8.4 %

## 2018-06-13 NOTE — PROGRESS NOTES
[facility]               Earnestinerossy Berumen MD        [address] 78 [phone] Fax [phone]               Patient Information  Date:6/13/2018  Name : Armando Clark 52 y.o.     YOB: 1965         PCP: Vianney Celestin MD         Chief Complaint   Patient presents with    Diabetes       History of Present Illness: Armando Clark is a 52 y.o. male here for fu of  Type 2 Diabetes Mellitus. Self referred for evaluation and management of type 2 diabetes mellitus. Could not get Jardiance , did not take it for 2 months and now is on farxiga  Lost the meter while moving  He is taking the medications consistently  He was diagnosed with diabetes in 2013,  He did not bring the log          Wt Readings from Last 3 Encounters:   06/13/18 186 lb 14.4 oz (84.8 kg)   10/10/17 172 lb 3.2 oz (78.1 kg)   06/23/17 181 lb (82.1 kg)       BP Readings from Last 3 Encounters:   06/13/18 123/83   10/10/17 112/78   06/23/17 114/75           Past Medical History:   Diagnosis Date    [facility]     Prostate cancer [facility]     Type II diabetes mellitus, uncontrolled ([facility]      Current Outpatient Prescriptions   Medication Sig    dapagliflozin (FARXIGA) 10 mg tab tablet Take 1 Tab by mouth daily. Stop Jardiance    SITagliptin-metFORMIN (JANUMET XR) 50-1,000 mg TM24 Take 1 Tab by mouth two (2) times daily (with meals). Stop Januvia and Metformin    atorvastatin (LIPITOR) 20 mg tablet Take 1 Tab by mouth daily.  efavirenz-emtrictabine-tenofovir (ATRIPLA) tablet Take 1 Tab by mouth nightly.  aspirin (ASPIRIN) 325 mg tablet Take 325 mg by mouth daily.  [facility]apenem [facility]) 1 gram solr injection 1 g by IntraMUSCular route every twenty-four (24) hours.  cinnamon bark 500 mg cap Take  by mouth.  B.infantis-B.ani-B.long-B.bifi 10-15 mg TbEC Take  by mouth.     Omega-3-DHA-EPA-Fish Oil 1,000 mg (120 mg-180 mg) cap Take by mouth. No current facility-administered medications for this visit. Allergies   Allergen Reactions    Azithromycin Myalgia    Sulfa (Sulfonamide Antibiotics) Nausea Only         Review of Systems:  - Constitutional Symptoms: no fevers, no chills, no weight loss  - Eyes: no blurry vision no double vision  - Cardiovascular: no chest pain ,no palpitations  - Respiratory: no cough no shortness of breath  -     Physical Examination:   Blood pressure 123/83, pulse (!) 105, temperature 97.7 °F (36.5 °C), temperature source Oral, resp. rate 16, height 5' 11\" (1.803 m), weight 186 lb 14.4 oz (84.8 kg), SpO2 96 %. Estimated body mass index is 26.07 kg/(m^2) as calculated from the following:    Height as of this encounter: 5' 11\" (1.803 m). -   Weight as of this encounter: 186 lb 14.4 oz (84.8 kg). - General: pleasant, no distress, good eye contact  - HEENT: no pallor, no periorbital edema, EOMI  - Neck: supple,   - Cardiovascular: regular, normal rate, normal S1 and S2,   - Respiratory: clear to auscultation bilaterally  - Gastrointestinal: soft, nontender, nondistended,  BS +  - Neurological: alert and oriented  - Psychiatric: normal mood and affect  - Skin: color, texture, turgor normal.     Diabetic foot exam: February 2018    Left:     Vibratory sensation normal    Filament test normal sensation with micro filament   Pulse DP: 1+    Deformities: None  Right:    Vibratory sensation normal   Filament test normal sensation with micro filament   Pulse DP: 1+   Deformities: None    Data Reviewed:     [] Glucose records reviewed. [] See glucose records for details (to be scanned). [] A1C  [] Reviewed labs    Cr nl     Assessment/Plan:     1. Type 2 diabetes mellitus with hyperglycemia, without long-term current use of insulin ([facility])        1.  Type 2 Diabetes Mellitus with no nephropathy,neuropathy,retinopathy  Lab Results   Component Value Date/Time    Hemoglobin A1c (POC) 8.4 06/13/2018 03:50 PM    Hemoglobin A1c, External 8.6 02/20/2018     Uncontrolled, was off medications for 2 months due to insurance changes  No signs of insulin resistance, no history of pancreatitis. Metformin 1000 mg twice daily, Januvia 100 mg.  farxiga           2. HIV - Followed at [facility]     3 HTN -    4 Hyperlipidemia -   Statin      There are no Patient Instructions on file for this visit. Follow-up Disposition: Not on File    Thank you for allowing me to participate in the care of this patient.     Jose Messer MD      Patient verbalized understanding

## 2018-06-13 NOTE — MR AVS SNAPSHOT
[address] [facility] [address] 
[phone] Patient: Sarahi Hugo MRN: [id number] NRP:6/1/1880 Visit Information Date & Time Provider Department Dept. Phone Encounter #  
 6/13/2018  3:45 PM Alicia Adorno MD [facility] [place] [facility] [phone] [id number] Follow-up Instructions Return in about 4 months (around 10/13/2018). Upcoming Health Maintenance Date Due Hepatitis C Screening 1965 Pneumococcal 19-64 Highest Risk (1 of 3 - PCV13) 4/7/1982 DTaP/Tdap/Td series (1 - Tdap) 4/7/1984 FOBT Q 1 YEAR AGE 50-75 4/7/2013 Influenza Age 5 to Adult 8/1/2018 HEMOGLOBIN A1C Q6M 8/20/2018 FOOT EXAM Q1 2/13/2019 MICROALBUMIN Q1 2/20/2019 EYE EXAM RETINAL OR DILATED Q1 3/6/2019 LIPID PANEL Q1 5/10/2019 Allergies as of 6/13/2018  Review Complete On: 6/13/2018 By: Alicia Adorno MD  
  
 Severity Noted Reaction Type Reactions Azithromycin  04/26/2016    Myalgia Sulfa (Sulfonamide Antibiotics)  04/26/2016    Nausea Only Current Immunizations  Never Reviewed No immunizations on file. Not reviewed this visit You Were Diagnosed With   
  
 Codes Comments Type 2 diabetes mellitus with hyperglycemia, without long-term current use of insulin (HCC)    -  Primary ICD-10-CM: E11.65 ICD-9-CM: 250.00, 790.29 Vitals BP Pulse Temp Resp Height(growth percentile) Weight(growth percentile) 123/83 (BP 1 Location: Left arm, BP Patient Position: Sitting) (!) 105 97.7 °F (36.5 °C) (Oral) 16 5' 11\" (1.803 m) 186 lb 14.4 oz (84.8 kg) SpO2 BMI Smoking Status 96% 26.07 kg/m2 Never Smoker Vitals History BMI and BSA Data Body Mass Index Body Surface Area 26.07 kg/m 2 2.06 m 2 Preferred Pharmacy Pharmacy Name Phone  [id number] [place], [place] - [address] [facility] AT [address] [phone] Your Updated Medication List  
  
   
This list is accurate as of 6/13/18  4:35 PM.  Always use your most recent med list.  
  
  
  
  
 aspirin 325 mg tablet Commonly known as:  ASPIRIN Take 325 mg by mouth daily. atorvastatin 20 mg tablet Commonly known as:  LIPITOR Take 1 Tab by mouth daily. B.infantis-B.ani-B.long-B.bifi 10-15 mg Tbec Take  by mouth.  
  
 cinnamon bark 500 mg Cap Take  by mouth. dapagliflozin 10 mg Tab tablet Commonly known as:  [employer] Take 1 Tab by mouth daily. Stop Jardiance  
  
 efavirenz-emtrictabine-tenofovir tablet Commonly known as:  ATRIPLA Take 1 Tab by mouth nightly. ertapenem 1 gram Solr injection Commonly known as:  INVANZ  
1 g by IntraMUSCular route every twenty-four (24) hours. omega 3-DHA-EPA-fish oil 1,000 mg (120 mg-180 mg) capsule Take  by mouth. SITagliptin-metFORMIN 50-1,000 mg Tm24 Commonly known as:  JANUMET XR Take 1 Tab by mouth two (2) times daily (with meals). Stop Januvia and Metformin We Performed the Following AMB POC GLUCOSE BLOOD, BY GLUCOSE MONITORING DEVICE [61511 CPT(R)] AMB POC HEMOGLOBIN A1C [51700 CPT(R)] Follow-up Instructions Return in about 4 months (around 10/13/2018). Introducing [facility]! Breanna Garcia introduces [employer] patient portal. Now you can access parts of your medical record, email your doctor's office, and request medication refills online. 1. In your internet browser, go to [email or web address] 2. Click on the First Time User? Click Here link in the Sign In box. You will see the New Member Sign Up page. 3. Enter your [employer] Access Code exactly as it appears below. You will not need to use this code after youve completed the sign-up process. If you do not sign up before the expiration date, you must request a new code. · [employer] Access Code: [email or web address] Expires: 9/11/2018  4:35 PM 
 
4. Enter the last four digits of your Social Security Number (xxxx) and Date of Birth (mm/dd/yyyy) as indicated and click Submit. You will be taken to the next sign-up page. 5. Create a [employer] ID. This will be your [employer] login ID and cannot be changed, so think of one that is secure and easy to remember. 6. Create a [employer] password. You can change your password at any time. 7. Enter your Password Reset Question and Answer. This can be used at a later time if you forget your password. 8. Enter your e-mail address. You will receive e-mail notification when new information is available in [address] 9. Click Sign Up. You can now view and download portions of your medical record. 10. Click the Download Summary menu link to download a portable copy of your medical information. If you have questions, please visit the Frequently Asked Questions section of the [employer] website. Remember, [employer] is NOT to be used for urgent needs. For medical emergencies, dial 911. Now available from your iPhone and Android! Please provide this summary of care documentation to your next provider. Your primary care clinician is listed as Shahid Delaney. If you have any questions after today's visit, please call [phone].

## 2018-06-13 NOTE — PROGRESS NOTES
Cathey Bernheim is a 52 y.o. male here for   Chief Complaint   Patient presents with    Diabetes       Functional glucose monitor and record keeping system? - yes  Eye exam within last year? - on file  Foot exam within last year? - on file    1. Have you been to the ER, urgent care clinic since your last visit? Hospitalized since your last visit? - no    2. Have you seen or consulted any other health care providers outside of the [facility] since your last visit?   Include any pap smears or colon screening.- PCP

## 2018-06-14 ENCOUNTER — OP HISTORICAL/CONVERTED ENCOUNTER (OUTPATIENT)
Dept: OTHER | Age: 53
End: 2018-06-14

## 2018-06-14 LAB
CREATININE, EXTERNAL: 0.83
CREATININE, EXTERNAL: 0.85
HBA1C MFR BLD HPLC: 8.7 %
LDL-C, EXTERNAL: 104
LDL-C, EXTERNAL: 105
MICROALBUMIN UR TEST STR-MCNC: 1.3 MG/DL

## 2018-08-02 ENCOUNTER — ED HISTORICAL/CONVERTED ENCOUNTER (OUTPATIENT)
Dept: OTHER | Age: 53
End: 2018-08-02

## 2018-08-20 ENCOUNTER — OP HISTORICAL/CONVERTED ENCOUNTER (OUTPATIENT)
Dept: OTHER | Age: 53
End: 2018-08-20

## 2018-08-27 ENCOUNTER — OP HISTORICAL/CONVERTED ENCOUNTER (OUTPATIENT)
Dept: OTHER | Age: 53
End: 2018-08-27

## 2018-08-27 DIAGNOSIS — E11.65 TYPE 2 DIABETES MELLITUS WITH HYPERGLYCEMIA, WITHOUT LONG-TERM CURRENT USE OF INSULIN (HCC): ICD-10-CM

## 2018-08-27 NOTE — TELEPHONE ENCOUNTER
----- Message from Warren Mcdonnell sent at 8/24/2018  6:10 PM EDT -----  Regarding: Dr Felipa Unger  Pt is requesting a 90 day supply refill on \"Faxiga\" to be sent to [facility]. Insurance will pay at this store, also has a card for no copayment.     Best contact # [phone]

## 2018-09-07 ENCOUNTER — OP HISTORICAL/CONVERTED ENCOUNTER (OUTPATIENT)
Dept: OTHER | Age: 53
End: 2018-09-07

## 2018-10-01 ENCOUNTER — OP HISTORICAL/CONVERTED ENCOUNTER (OUTPATIENT)
Dept: OTHER | Age: 53
End: 2018-10-01

## 2018-10-04 ENCOUNTER — OP HISTORICAL/CONVERTED ENCOUNTER (OUTPATIENT)
Dept: OTHER | Age: 53
End: 2018-10-04

## 2018-10-04 LAB
CREATININE, EXTERNAL: 0.94
PSA, EXTERNAL: 14.75

## 2018-10-11 ENCOUNTER — OP HISTORICAL/CONVERTED ENCOUNTER (OUTPATIENT)
Dept: OTHER | Age: 53
End: 2018-10-11

## 2018-10-18 ENCOUNTER — OP HISTORICAL/CONVERTED ENCOUNTER (OUTPATIENT)
Dept: OTHER | Age: 53
End: 2018-10-18

## 2018-10-22 ENCOUNTER — OP HISTORICAL/CONVERTED ENCOUNTER (OUTPATIENT)
Dept: OTHER | Age: 53
End: 2018-10-22

## 2018-10-27 DIAGNOSIS — E78.2 MIXED HYPERLIPIDEMIA: ICD-10-CM

## 2018-10-27 RX ORDER — ATORVASTATIN CALCIUM 20 MG/1
TABLET, FILM COATED ORAL
Qty: 90 TAB | Refills: 2 | Status: SHIPPED | OUTPATIENT
Start: 2018-10-27 | End: 2019-06-13

## 2018-10-29 ENCOUNTER — OFFICE VISIT (OUTPATIENT)
Dept: ENDOCRINOLOGY | Age: 53
End: 2018-10-29

## 2018-10-29 VITALS
OXYGEN SATURATION: 97 % | TEMPERATURE: 97.5 F | BODY MASS INDEX: 26.17 KG/M2 | SYSTOLIC BLOOD PRESSURE: 129 MMHG | DIASTOLIC BLOOD PRESSURE: 82 MMHG | HEART RATE: 111 BPM | HEIGHT: 71 IN | WEIGHT: 186.9 LBS

## 2018-10-29 DIAGNOSIS — E78.5 DYSLIPIDEMIA: ICD-10-CM

## 2018-10-29 DIAGNOSIS — E11.65 TYPE 2 DIABETES MELLITUS WITH HYPERGLYCEMIA, WITHOUT LONG-TERM CURRENT USE OF INSULIN (HCC): Primary | ICD-10-CM

## 2018-10-29 LAB
GLUCOSE POC: 292 MG/DL
HBA1C MFR BLD HPLC: 8.4 %

## 2018-10-29 NOTE — PROGRESS NOTES
[facility]               Libertad Arnold MD        [address] 78 [phone] Fax [phone]               Patient Information  Date:10/29/2018  Name : Jeri Hong 52 y.o.     YOB: 1965         PCP: Lewis Pham MD         Chief Complaint   Patient presents with    Follow-up     routine 4 mo       History of Present Illness: Jeri Hong is a 52 y.o. male here for fu of  Type 2 Diabetes Mellitus. Self referred for evaluation and management of type 2 diabetes mellitus. PSA has increased, history of prostate cancer,  scans so far have been negative, plan is to start Lupron  Seeing oncologist  He was intermittently taking the medication because of the stress, also had to hold metformin several times for different scans  Prior to that reportedly blood glucose was under control  No meter or log    He was diagnosed with diabetes in 2013,        Oncologist is Dr Kobe Noonan,         Wt Readings from Last 3 Encounters:   10/29/18 186 lb 14.4 oz (84.8 kg)   06/13/18 186 lb 14.4 oz (84.8 kg)   10/10/17 172 lb 3.2 oz (78.1 kg)       BP Readings from Last 3 Encounters:   10/29/18 129/82   06/13/18 123/83   10/10/17 112/78           Past Medical History:   Diagnosis Date    [facility]) [facility]     Prostate cancer ([facility]     Type II diabetes mellitus, uncontrolled ([facility]      Current Outpatient Medications   Medication Sig    atorvastatin (LIPITOR) 20 mg tablet TAKE 1 TABLET BY MOUTH EVERY DAY    dapagliflozin (FARXIGA) 10 mg tab tablet Take 1 Tab by mouth daily. Stop Jardiance    aspirin (ASPIRIN) 325 mg tablet Take 325 mg by mouth daily.  SITagliptin-metFORMIN (JANUMET XR) 50-1,000 mg TM24 Take 1 Tab by mouth two (2) times daily (with meals). Stop Januvia and Metformin    ertapenem (INVANZ) 1 gram solr injection 1 g by IntraMUSCular route every twenty-four (24) hours.     efavirenz-emtrictabine-tenofovir (ATRIPLA) tablet Take 1 Tab by mouth nightly.  cinnamon bark 500 mg cap Take  by mouth.  B.infantis-B.ani-B.long-B.bifi 10-15 mg TbEC Take  by mouth.  Omega-3-DHA-EPA-Fish Oil 1,000 mg (120 mg-180 mg) cap Take  by mouth. No current facility-administered medications for this visit. Allergies   Allergen Reactions    Azithromycin Myalgia    Sulfa (Sulfonamide Antibiotics) Nausea Only         Review of Systems:  - Constitutional Symptoms: no fevers, no chills, no weight loss  - Eyes: no blurry vision no double vision  - Cardiovascular: no chest pain ,no palpitations  - Respiratory: no cough no shortness of breath  -     Physical Examination:   Blood pressure 129/82, pulse (!) 111, temperature 97.5 °F (36.4 °C), temperature source Oral, height 5' 11\" (1.803 m), weight 186 lb 14.4 oz (84.8 kg), SpO2 97 %. Estimated body mass index is 26.07 kg/m² as calculated from the following:    Height as of this encounter: 5' 11\" (1.803 m). -   Weight as of this encounter: 186 lb 14.4 oz (84.8 kg). - General: pleasant, no distress, good eye contact  - HEENT: no pallor, no periorbital edema, EOMI  - Neck: supple,   - Cardiovascular: regular, normal rate, normal S1 and S2,   - Respiratory: clear to auscultation bilaterally  - Gastrointestinal: soft, nontender, nondistended,  BS +  - Neurological: alert and oriented  - Psychiatric: normal mood and affect  - Skin: color, texture, turgor normal.     Diabetic foot exam: February 2018    Left:     Vibratory sensation normal    Filament test normal sensation with micro filament   Pulse DP: 1+    Deformities: None  Right:    Vibratory sensation normal   Filament test normal sensation with micro filament   Pulse DP: 1+   Deformities: None    Data Reviewed:         Jc nazario     Assessment/Plan:     1. Type 2 diabetes mellitus with hyperglycemia, without long-term current use of insulin (HCC)        1.  Type 2 Diabetes Mellitus   Lab Results   Component Value Date/Time Hemoglobin A1c (POC) 8.4 10/29/2018 03:48 PM    Hemoglobin A1c, External 8.7 06/14/2018     Uncontrolled, was off medications   No signs of insulin resistance, no history of pancreatitis. Resume medications  Janumet, farxiga          2. HIV - Followed at [facility]     3 HTN -    4 Hyperlipidemia -   Statin      Prostate cancer -followed by oncologist    There are no Patient Instructions on file for this visit. Follow-up Disposition: Not on File    Thank you for allowing me to participate in the care of this patient.     Sharri Curran MD      Patient verbalized understanding

## 2018-10-29 NOTE — PROGRESS NOTES
Chief Complaint   Patient presents with    Follow-up     routine 4 mo     1. Have you been to the ER, urgent care clinic since your last visit? Hospitalized since your last visit? Yes When: 2nd Aug 2018 Where: [facility]    2. Have you seen or consulted any other health care providers outside of the [facility] since your last visit? Include any pap smears or colon screening.  No

## 2018-11-02 ENCOUNTER — OP HISTORICAL/CONVERTED ENCOUNTER (OUTPATIENT)
Dept: OTHER | Age: 53
End: 2018-11-02

## 2018-11-12 ENCOUNTER — OP HISTORICAL/CONVERTED ENCOUNTER (OUTPATIENT)
Dept: OTHER | Age: 53
End: 2018-11-12

## 2018-12-11 ENCOUNTER — OP HISTORICAL/CONVERTED ENCOUNTER (OUTPATIENT)
Dept: OTHER | Age: 53
End: 2018-12-11

## 2018-12-13 ENCOUNTER — OP HISTORICAL/CONVERTED ENCOUNTER (OUTPATIENT)
Dept: OTHER | Age: 53
End: 2018-12-13

## 2018-12-18 ENCOUNTER — TELEPHONE (OUTPATIENT)
Dept: ENDOCRINOLOGY | Age: 53
End: 2018-12-18

## 2018-12-18 NOTE — TELEPHONE ENCOUNTER
Patient wants to discuss a recall that his girlfriend heard on the radio and wants medication changed.

## 2018-12-19 NOTE — TELEPHONE ENCOUNTER
Informed pt that Dr Ramon Kirkpatrick is out of town until the first of the year, but if he'd like to call back and give the name of the medication, I could send message to the covering physician. Asked pt to return call.

## 2019-01-16 ENCOUNTER — OP HISTORICAL/CONVERTED ENCOUNTER (OUTPATIENT)
Dept: OTHER | Age: 54
End: 2019-01-16

## 2019-01-25 DIAGNOSIS — E11.65 TYPE 2 DIABETES MELLITUS WITH HYPERGLYCEMIA, WITHOUT LONG-TERM CURRENT USE OF INSULIN (HCC): ICD-10-CM

## 2019-01-25 RX ORDER — SITAGLIPTIN AND METFORMIN HYDROCHLORIDE 50; 1000 MG/1; MG/1
TABLET, FILM COATED, EXTENDED RELEASE ORAL
Qty: 180 TAB | Refills: 3 | Status: SHIPPED | OUTPATIENT
Start: 2019-01-25 | End: 2019-10-22 | Stop reason: ALTCHOICE

## 2019-04-25 ENCOUNTER — OP HISTORICAL/CONVERTED ENCOUNTER (OUTPATIENT)
Dept: OTHER | Age: 54
End: 2019-04-25

## 2019-04-25 LAB
CREATININE, EXTERNAL: 0.8
HBA1C MFR BLD HPLC: 10.9 %
LDL-C, EXTERNAL: 128
MICROALBUMIN UR TEST STR-MCNC: 0.9 MG/DL

## 2019-05-02 ENCOUNTER — TELEPHONE (OUTPATIENT)
Dept: ENDOCRINOLOGY | Age: 54
End: 2019-05-02

## 2019-05-02 NOTE — TELEPHONE ENCOUNTER
Informed pt that A1C was 10.9%, which is high. Asked pt to check sugars fasting, before dinner and at bedtime, maintain a log and bring it in in for weeks. Pt states she has an appt on 06/13/2019 and will bring it then.

## 2019-05-28 DIAGNOSIS — E78.2 MIXED HYPERLIPIDEMIA: Primary | ICD-10-CM

## 2019-05-28 RX ORDER — SIMVASTATIN 20 MG/1
20 TABLET, FILM COATED ORAL
Qty: 90 TAB | Refills: 3 | Status: SHIPPED | OUTPATIENT
Start: 2019-05-28 | End: 2020-08-21

## 2019-06-10 ENCOUNTER — OP HISTORICAL/CONVERTED ENCOUNTER (OUTPATIENT)
Dept: OTHER | Age: 54
End: 2019-06-10

## 2019-06-12 NOTE — PROGRESS NOTES
Divya Whalen is a 62 y.o. male here for   Chief Complaint   Patient presents with    Diabetes    Diabetic Foot Exam       Functional glucose monitor and record keeping system? -yes   Eye exam within last year? - on file  Foot exam within last year? - due    1. Have you been to the ER, urgent care clinic since your last visit? Hospitalized since your last visit? -no    2. Have you seen or consulted any other health care providers outside of the [address] [place] [address] since your last visit?   Include any pap smears or colon screening.-

## 2019-06-13 ENCOUNTER — OFFICE VISIT (OUTPATIENT)
Dept: ENDOCRINOLOGY | Age: 54
End: 2019-06-13

## 2019-06-13 VITALS
BODY MASS INDEX: 26.46 KG/M2 | RESPIRATION RATE: 14 BRPM | DIASTOLIC BLOOD PRESSURE: 77 MMHG | TEMPERATURE: 96.9 F | WEIGHT: 189 LBS | HEIGHT: 71 IN | OXYGEN SATURATION: 97 % | SYSTOLIC BLOOD PRESSURE: 125 MMHG | HEART RATE: 96 BPM

## 2019-06-13 DIAGNOSIS — E11.65 TYPE 2 DIABETES MELLITUS WITH HYPERGLYCEMIA, WITHOUT LONG-TERM CURRENT USE OF INSULIN (HCC): Primary | ICD-10-CM

## 2019-06-13 DIAGNOSIS — I10 ESSENTIAL HYPERTENSION: ICD-10-CM

## 2019-06-13 DIAGNOSIS — E78.5 DYSLIPIDEMIA: ICD-10-CM

## 2019-06-13 RX ORDER — GLIMEPIRIDE 2 MG/1
2 TABLET ORAL
Qty: 30 TAB | Refills: 3 | Status: SHIPPED | OUTPATIENT
Start: 2019-06-13 | End: 2019-09-25 | Stop reason: SDUPTHER

## 2019-06-13 RX ORDER — MELATONIN
DAILY
COMMUNITY
End: 2022-03-14

## 2019-06-13 NOTE — PROGRESS NOTES
[facility]               Sage Castellon MD        [address] 78 [phone] Fax [phone]               Patient Information  Date:6/13/2019  Name : Diane Nobles 62 y.o.     YOB: 1965         PCP: Carolin Gonzales MD         Chief Complaint   Patient presents with    Diabetes    Diabetic Foot Exam       History of Present Illness: Diane Nobles is a 62 y.o. male here for fu of  Type 2 Diabetes Mellitus. Self referred for evaluation and management of type 2 diabetes mellitus. Started checking the blood glucose after the last A1c, change the diet,  Blood glucoses improved  He is on oral medications  No hypoglycemia    He was diagnosed with diabetes in 2013,        Oncologist is Dr Jose Davis,         Wt Readings from Last 3 Encounters:   06/13/19 189 lb (85.7 kg)   10/29/18 186 lb 14.4 oz (84.8 kg)   06/13/18 186 lb 14.4 oz (84.8 kg)       BP Readings from Last 3 Encounters:   06/13/19 125/77   10/29/18 129/82   06/13/18 123/83           Past Medical History:   Diagnosis Date    HIV (human immunodeficiency virus infection) ([facility]     Prostate cancer ([facility]     Type II diabetes mellitus, uncontrolled ([facility]      Current Outpatient Medications   Medication Sig    cholecalciferol (VITAMIN D3) 1,000 unit tablet Take  by mouth daily.  simvastatin (ZOCOR) 20 mg tablet Take 1 Tab by mouth nightly.  JANUMET XR 50-1,000 mg TM24 TAKE 1 TABLET TWICE A DAY WITH MEALS (STOP JANUVIA & METFORMIN)    dapagliflozin (FARXIGA) 10 mg tab tablet Take 1 Tab by mouth daily. Stop Jardiance    aspirin (ASPIRIN) 325 mg tablet Take 325 mg by mouth daily.  efavirenz-emtrictabine-tenofovir (ATRIPLA) tablet Take 1 Tab by mouth nightly.  cinnamon bark 500 mg cap Take  by mouth.  atorvastatin (LIPITOR) 20 mg tablet TAKE 1 TABLET BY MOUTH EVERY DAY    ertapenem (INVANZ) 1 gram solr injection 1 g by IntraMUSCular route every twenty-four (24) hours.  B.infantis-B.ani-B.long-B.bifi 10-15 mg TbEC Take  by mouth.  Omega-3-DHA-EPA-Fish Oil 1,000 mg (120 mg-180 mg) cap Take  by mouth. No current facility-administered medications for this visit. Allergies   Allergen Reactions    Azithromycin Myalgia    Sulfa (Sulfonamide Antibiotics) Nausea Only         Review of Systems:  - Constitutional Symptoms: no fevers, no chills, no weight loss  - Eyes: no blurry vision no double vision  - Cardiovascular: no chest pain ,no palpitations  - Respiratory: no cough no shortness of breath  -     Physical Examination:   Blood pressure 125/77, pulse 96, temperature 96.9 °F (36.1 °C), temperature source Oral, resp. rate 14, height 5' 11\" (1.803 m), weight 189 lb (85.7 kg), SpO2 97 %. Estimated body mass index is 26.36 kg/m² as calculated from the following:    Height as of this encounter: 5' 11\" (1.803 m). -   Weight as of this encounter: 189 lb (85.7 kg). - General: pleasant, no distress, good eye contact  - HEENT: no pallor, no periorbital edema, EOMI  - Neck: supple,   - Cardiovascular: regular, normal rate, normal S1 and S2,   - Respiratory: clear to auscultation bilaterally  - Gastrointestinal: soft, nontender, nondistended,  BS +  - Neurological: alert and oriented  - Psychiatric: normal mood and affect  - Skin: color, texture, turgor normal.     Diabetic foot exam: June 2019    Left:     Vibratory sensation normal    Filament test normal sensation with micro filament   Pulse DP: 1+    Deformities: None  Right:    Vibratory sensation normal   Filament test normal sensation with micro filament   Pulse DP: 1+   Deformities: None    Data Reviewed:         Cr nl     Assessment/Plan:     1. Type 2 diabetes mellitus with hyperglycemia, without long-term current use of insulin ([address]    2. Essential hypertension    3. Dyslipidemia        1.  Type 2 Diabetes Mellitus   Lab Results   Component Value Date/Time    Hemoglobin A1c (POC) 8.4 10/29/2018 03:48 PM    Hemoglobin A1c, External 10.9 04/25/2019       If he continues to maintain the blood glucose control should improve  No signs of insulin resistance, no history of pancreatitis. Rolando Mishra          2. HIV - Followed at [facility]     3 HTN -    4 Hyperlipidemia -   Statin      Prostate cancer -followed by oncologist    There are no Patient Instructions on file for this visit. Thank you for allowing me to participate in the care of this patient.     Luz Ennis MD      Patient verbalized understanding

## 2019-07-30 ENCOUNTER — OP HISTORICAL/CONVERTED ENCOUNTER (OUTPATIENT)
Dept: OTHER | Age: 54
End: 2019-07-30

## 2019-08-18 DIAGNOSIS — E11.65 TYPE 2 DIABETES MELLITUS WITH HYPERGLYCEMIA, WITHOUT LONG-TERM CURRENT USE OF INSULIN (HCC): ICD-10-CM

## 2019-08-18 RX ORDER — DAPAGLIFLOZIN 10 MG/1
TABLET, FILM COATED ORAL
Qty: 90 TAB | Refills: 3 | Status: SHIPPED | OUTPATIENT
Start: 2019-08-18 | End: 2020-04-08 | Stop reason: ALTCHOICE

## 2019-09-25 RX ORDER — GLIMEPIRIDE 2 MG/1
TABLET ORAL
Qty: 90 TAB | Refills: 1 | Status: SHIPPED | OUTPATIENT
Start: 2019-09-25 | End: 2020-08-21

## 2019-10-21 ENCOUNTER — OFFICE VISIT (OUTPATIENT)
Dept: ENDOCRINOLOGY | Age: 54
End: 2019-10-21

## 2019-10-21 VITALS
HEART RATE: 100 BPM | OXYGEN SATURATION: 96 % | DIASTOLIC BLOOD PRESSURE: 72 MMHG | SYSTOLIC BLOOD PRESSURE: 115 MMHG | BODY MASS INDEX: 26.88 KG/M2 | WEIGHT: 192 LBS | HEIGHT: 71 IN | RESPIRATION RATE: 16 BRPM

## 2019-10-21 DIAGNOSIS — I10 ESSENTIAL HYPERTENSION: ICD-10-CM

## 2019-10-21 DIAGNOSIS — E11.65 TYPE 2 DIABETES MELLITUS WITH HYPERGLYCEMIA, WITHOUT LONG-TERM CURRENT USE OF INSULIN (HCC): Primary | ICD-10-CM

## 2019-10-21 DIAGNOSIS — E78.5 DYSLIPIDEMIA: ICD-10-CM

## 2019-10-21 LAB
GLUCOSE POC: 211 MG/DL
HBA1C MFR BLD HPLC: 8.8 %

## 2019-10-21 NOTE — PROGRESS NOTES
Chris Rivera is a 62 y.o. male here for   Chief Complaint   Patient presents with    Diabetes       Functional glucose monitor and record keeping system? - yes  Eye exam within last year? - on file  Foot exam within last year? - on file    1. Have you been to the ER, urgent care clinic since your last visit? Hospitalized since your last visit? -no    2. Have you seen or consulted any other health care providers outside of the [address] [place] [address] since your last visit?   Include any pap smears or colon screening.-no

## 2019-10-21 NOTE — LETTER
10/24/19 Patient: Vickie Joy YOB: 1965 Date of Visit: 10/21/2019 Julian Holder, [address] [address] VIA Facsimile: [phone] Dear Julian Holder MD, Thank you for referring Mr. Vickie Joy to [address] [place] [address] for evaluation. My notes for this consultation are attached. If you have questions, please do not hesitate to call me. I look forward to following your patient along with you. Sincerely, Joaquin Sapp MD

## 2019-10-21 NOTE — PROGRESS NOTES
[facility]               Presley Thomason MD        [address] [address] :[phone] Fax [phone]               Patient Information  Date:10/24/2019  Name : Chris Rivera 62 y.o.     YOB: 1965         PCP: Rosalia Trejo MD         Chief Complaint   Patient presents with    Diabetes       History of Present Illness: Chris Rivera is a 62 y.o. male here for fu of  Type 2 Diabetes Mellitus. Self referred for evaluation and management of type 2 diabetes mellitus. He did not bring the meter  Blood glucose is fluctuating  No hypoglycemia  He is interested in GLP-1 agonist this time, requesting Ozempic    He was diagnosed with diabetes in 2013,        Oncologist is Dr Bisi Traore,         [place] Readings from Last 3 Encounters:   10/21/19 192 lb (87.1 kg)   06/13/19 189 lb (85.7 kg)   10/29/18 186 lb 14.4 oz (84.8 kg)       BP Readings from Last 3 Encounters:   10/21/19 115/72   06/13/19 125/77   10/29/18 129/82           Past Medical History:   Diagnosis Date    [facility] ([facility] virus infection) ([facility]     Prostate cancer ([facility]     Type II diabetes mellitus, uncontrolled ([facility]      Current Outpatient Medications   Medication Sig    semaglutide (OZEMPIC) 0.25 mg/0.2 mL (2 mg/1.5 mL) sub-q pen 0.25 mg by SubCUTAneous route every seven (7) days. Dispense as sample per Dr Jb Trammell    glimepiride (AMARYL) 2 mg tablet TAKE 1 TABLET BY MOUTH EVERY DAY IN THE MORNING    FARXIGA 10 mg tab tablet TAKE 1 TAB BY MOUTH DAILY. STOP JARDIANCE    cholecalciferol (VITAMIN D3) 1,000 unit tablet Take  by mouth daily.  simvastatin (ZOCOR) 20 mg tablet Take 1 Tab by mouth nightly.  aspirin (ASPIRIN) 325 mg tablet Take 325 mg by mouth daily.  efavirenz-emtrictabine-tenofovir (ATRIPLA) tablet Take 1 Tab by mouth nightly.  cinnamon bark 500 mg cap Take  by mouth.     semaglutide (OZEMPIC) 0.25 mg/0.2 mL (2 mg/1.5 mL) sub-q pen 0.5 mg by SubCUTAneous route every seven (7) days.  Insulin Needles, Disposable, 32 gauge x 5/32\" ndle Use to inject ozempic weekly Dx Code: E11.65    metFORMIN (GLUCOPHAGE) 1,000 mg tablet Take 1 Tab by mouth two (2) times daily (with meals).  ertapenem [facility]) 1 gram solr injection 1 g by IntraMUSCular route every twenty-four (24) hours.  B.infantis-B.ani-B.long-B.bifi 10-15 mg TbEC Take  by mouth.  Omega-3-DHA-EPA-Fish Oil 1,000 mg (120 mg-180 mg) cap Take  by mouth. No current facility-administered medications for this visit. Allergies   Allergen Reactions    Azithromycin Myalgia    Sulfa (Sulfonamide Antibiotics) Nausea Only         Review of Systems:  - Constitutional Symptoms: no fevers, no chills, no weight loss  - Eyes: no blurry vision no double vision  - Cardiovascular: no chest pain ,no palpitations  - Respiratory: no cough no shortness of breath  -     Physical Examination:   Blood pressure 115/72, pulse 100, resp. rate 16, height 5' 11\" (1.803 m), weight 192 lb (87.1 kg), SpO2 96 %. Estimated body mass index is 26.78 kg/m² as calculated from the following:    Height as of this encounter: 5' 11\" (1.803 m). -   Weight as of this encounter: 192 lb (87.1 kg).   - General: pleasant, no distress, good eye contact  - HEENT: no pallor, no periorbital edema, EOMI  - Neck: supple,   - Cardiovascular: regular, normal rate, normal S1 and S2,   - Respiratory: clear to auscultation bilaterally  - Gastrointestinal: soft, nontender, nondistended,  BS +  - Neurological: alert and oriented  - Psychiatric: normal mood and affect  - Skin: color, texture, turgor normal.     Diabetic foot exam: June 2019    Left:     Vibratory sensation normal    Filament test normal sensation with micro filament   Pulse DP: 1+    Deformities: None  Right:    Vibratory sensation normal   Filament test normal sensation with micro filament   Pulse DP: 1+   Deformities: None    Data Reviewed:         Jc nazario     Assessment/Plan: 1. Type 2 diabetes mellitus with hyperglycemia, without long-term current use of insulin ([facility]    2. Essential hypertension    3. Dyslipidemia        1. Type 2 Diabetes Mellitus   Lab Results   Component Value Date/Time    Hemoglobin A1c (POC) 8.8 10/21/2019 03:54 PM    Hemoglobin A1c, External 10.9 04/25/2019     Ozempic  No signs of insulin resistance, no history of pancreatitis. Metformin  farxiga  Discontinue glimepiride if hypoglycemia        2. HIV - Followed at [facility]     3 HTN -    4 Hyperlipidemia -   Statin      Prostate cancer -followed by oncologist    Patient Instructions   OZEMPIC     Inject 0.25 mg weekly for one month, then increase to 0.5 mg weekly. Some of the side effects are nausea, vomiting and in most cases it will eventually resolve. If there is a family history of medullary thyroid cancer you should not be taking this medication. Once you tolerate 0.5 mg weekly we can increase it to 1 mg weekly if desired weight loss or sugar control is not achieved, you need a different prescription for 1 mg weekly dose      If you have low sugar stop Glimepiride     Follow-up and Dispositions    · Return in about 3 months (around 1/21/2020). Thank you for allowing me to participate in the care of this patient.     Davion uV MD      Patient verbalized understanding

## 2019-10-21 NOTE — PATIENT INSTRUCTIONS
OZEMPIC     Inject 0.25 mg weekly for one month, then increase to 0.5 mg weekly. Some of the side effects are nausea, vomiting and in most cases it will eventually resolve. If there is a family history of medullary thyroid cancer you should not be taking this medication.     Once you tolerate 0.5 mg weekly we can increase it to 1 mg weekly if desired weight loss or sugar control is not achieved, you need a different prescription for 1 mg weekly dose      If you have low sugar stop Glimepiride

## 2019-10-22 RX ORDER — PEN NEEDLE, DIABETIC 31 GX3/16"
NEEDLE, DISPOSABLE MISCELLANEOUS
Qty: 100 PEN NEEDLE | Refills: 11 | Status: SHIPPED | OUTPATIENT
Start: 2019-10-22

## 2019-10-22 RX ORDER — METFORMIN HYDROCHLORIDE 1000 MG/1
1000 TABLET ORAL 2 TIMES DAILY WITH MEALS
Qty: 180 TAB | Refills: 3 | Status: SHIPPED | OUTPATIENT
Start: 2019-10-22 | End: 2020-10-12 | Stop reason: SDUPTHER

## 2019-10-25 ENCOUNTER — OP HISTORICAL/CONVERTED ENCOUNTER (OUTPATIENT)
Dept: OTHER | Age: 54
End: 2019-10-25

## 2019-10-28 ENCOUNTER — TELEPHONE (OUTPATIENT)
Dept: ENDOCRINOLOGY | Age: 54
End: 2019-10-28

## 2019-10-28 NOTE — TELEPHONE ENCOUNTER
----- Message from Zenia Escobedo sent at 10/28/2019 12:27 PM EDT -----  Regarding: Dr. Sands Left  Pt is requesting a call to discuss which medications he will continue to take and which medications he will stop. Best contact number [phone].

## 2019-10-29 NOTE — TELEPHONE ENCOUNTER
Informed pt that he is to continue metformin, farxiga, glimepiride and start ozempic. Explained to pt per Dr Sarmiento Lay that he is to stop glimepiride if he begins to have low BG. Pt verbalized understanding.

## 2019-11-21 ENCOUNTER — ED HISTORICAL/CONVERTED ENCOUNTER (OUTPATIENT)
Dept: OTHER | Age: 54
End: 2019-11-21

## 2019-12-05 ENCOUNTER — OP HISTORICAL/CONVERTED ENCOUNTER (OUTPATIENT)
Dept: OTHER | Age: 54
End: 2019-12-05

## 2020-02-10 ENCOUNTER — OP HISTORICAL/CONVERTED ENCOUNTER (OUTPATIENT)
Dept: OTHER | Age: 55
End: 2020-02-10

## 2020-03-19 ENCOUNTER — OP HISTORICAL/CONVERTED ENCOUNTER (OUTPATIENT)
Dept: OTHER | Age: 55
End: 2020-03-19

## 2020-04-08 ENCOUNTER — TELEPHONE (OUTPATIENT)
Dept: ENDOCRINOLOGY | Age: 55
End: 2020-04-08

## 2020-04-08 DIAGNOSIS — E11.65 TYPE 2 DIABETES MELLITUS WITH HYPERGLYCEMIA, WITHOUT LONG-TERM CURRENT USE OF INSULIN (HCC): Primary | ICD-10-CM

## 2020-04-08 NOTE — TELEPHONE ENCOUNTER
Informed pt that his insurance denied the [place] and that he has to try and fail Invokana before they will cover it. Per Dr. Vanessa Bernard we will send in Invokana 300 mg take 1 tab by mouth daily. Pt asked if we had a savings card. Will mail pt the savings card. No further questions or concerns at this time. Savings card mailed.

## 2020-04-15 ENCOUNTER — TELEPHONE (OUTPATIENT)
Dept: ENDOCRINOLOGY | Age: 55
End: 2020-04-15

## 2020-04-15 NOTE — TELEPHONE ENCOUNTER
----- Message from Tianna Villafana sent at 4/15/2020  3:53 PM EDT -----  Regarding: Dr Kanu Lee first and last name:      Reason for call: Pt is following up on the status of the discount card for Invokana which he requested approximately two weeks ago.       Callback required yes/no and why: yes      Best contact number(s):[phone]      Details to clarify the request:      Tianna Villafana

## 2020-04-16 NOTE — TELEPHONE ENCOUNTER
Informed pt that it was mailed out last week when we spoke. Our mail takes longer as it has to go to [facility] and be stamped first. He should receive it in the next few days. Pt asked if there was a savings card for Ozempic. Informed him we do not have a card by itself but he can go online and print one off. Pt verbalized understanding with no further questions or concerns at this time.

## 2020-08-11 VITALS
BODY MASS INDEX: 25.84 KG/M2 | SYSTOLIC BLOOD PRESSURE: 119 MMHG | HEIGHT: 72 IN | OXYGEN SATURATION: 96 % | DIASTOLIC BLOOD PRESSURE: 79 MMHG | WEIGHT: 190.8 LBS | TEMPERATURE: 98.3 F | HEART RATE: 111 BPM

## 2020-08-11 VITALS
WEIGHT: 190.8 LBS | OXYGEN SATURATION: 96 % | DIASTOLIC BLOOD PRESSURE: 79 MMHG | TEMPERATURE: 98.3 F | SYSTOLIC BLOOD PRESSURE: 119 MMHG | BODY MASS INDEX: 25.84 KG/M2 | HEART RATE: 111 BPM | HEIGHT: 72 IN

## 2020-08-11 RX ORDER — BICTEGRAVIR SODIUM, EMTRICITABINE, AND TENOFOVIR ALAFENAMIDE FUMARATE 50; 200; 25 MG/1; MG/1; MG/1
1 TABLET ORAL DAILY
COMMUNITY

## 2020-08-11 RX ORDER — ATORVASTATIN CALCIUM 20 MG/1
TABLET, FILM COATED ORAL DAILY
COMMUNITY
End: 2021-03-19

## 2020-08-11 RX ORDER — AMMONIUM LACTATE 12 G/100G
LOTION TOPICAL AS NEEDED
COMMUNITY
End: 2020-08-24 | Stop reason: SDUPTHER

## 2020-08-11 RX ORDER — TERBINAFINE HYDROCHLORIDE 250 MG/1
250 TABLET ORAL AS NEEDED
COMMUNITY

## 2020-08-12 ENCOUNTER — OFFICE VISIT (OUTPATIENT)
Dept: PODIATRY | Age: 55
End: 2020-08-12
Payer: COMMERCIAL

## 2020-08-12 VITALS
SYSTOLIC BLOOD PRESSURE: 114 MMHG | HEART RATE: 88 BPM | TEMPERATURE: 99.3 F | HEIGHT: 72 IN | WEIGHT: 185.8 LBS | DIASTOLIC BLOOD PRESSURE: 78 MMHG | BODY MASS INDEX: 25.17 KG/M2 | OXYGEN SATURATION: 97 %

## 2020-08-12 DIAGNOSIS — L85.3 XEROSIS CUTIS: Primary | ICD-10-CM

## 2020-08-12 DIAGNOSIS — E11.65 TYPE 2 DIABETES MELLITUS WITH HYPERGLYCEMIA, WITHOUT LONG-TERM CURRENT USE OF INSULIN (HCC): ICD-10-CM

## 2020-08-12 DIAGNOSIS — B35.1 ONYCHOMYCOSIS: ICD-10-CM

## 2020-08-12 PROCEDURE — 11721 DEBRIDE NAIL 6 OR MORE: CPT | Performed by: PODIATRIST

## 2020-08-12 PROCEDURE — 99213 OFFICE O/P EST LOW 20 MIN: CPT | Performed by: PODIATRIST

## 2020-08-21 ENCOUNTER — OFFICE VISIT (OUTPATIENT)
Dept: ENDOCRINOLOGY | Age: 55
End: 2020-08-21
Payer: COMMERCIAL

## 2020-08-21 VITALS
WEIGHT: 184 LBS | SYSTOLIC BLOOD PRESSURE: 118 MMHG | DIASTOLIC BLOOD PRESSURE: 76 MMHG | TEMPERATURE: 96 F | OXYGEN SATURATION: 97 % | HEART RATE: 96 BPM | BODY MASS INDEX: 24.92 KG/M2 | HEIGHT: 72 IN | RESPIRATION RATE: 16 BRPM

## 2020-08-21 DIAGNOSIS — E11.65 TYPE 2 DIABETES MELLITUS WITH HYPERGLYCEMIA, WITHOUT LONG-TERM CURRENT USE OF INSULIN (HCC): Primary | ICD-10-CM

## 2020-08-21 DIAGNOSIS — E78.5 DYSLIPIDEMIA: ICD-10-CM

## 2020-08-21 DIAGNOSIS — I10 ESSENTIAL HYPERTENSION: ICD-10-CM

## 2020-08-21 PROBLEM — H26.9 BILATERAL CATARACTS: Status: ACTIVE | Noted: 2020-08-21

## 2020-08-21 PROBLEM — N41.1 CHRONIC PROSTATITIS: Status: ACTIVE | Noted: 2020-08-21

## 2020-08-21 PROBLEM — N52.9 IMPOTENCE OF ORGANIC ORIGIN: Status: ACTIVE | Noted: 2020-08-21

## 2020-08-21 PROBLEM — N32.0 BLADDER NECK OBSTRUCTION: Status: ACTIVE | Noted: 2020-08-21

## 2020-08-21 PROBLEM — C61 PRIMARY MALIGNANT NEOPLASM OF PROSTATE (HCC): Status: ACTIVE | Noted: 2020-08-21

## 2020-08-21 PROBLEM — R35.1 NOCTURIA: Status: ACTIVE | Noted: 2020-08-21

## 2020-08-21 LAB — HBA1C MFR BLD HPLC: 7.2 %

## 2020-08-21 PROCEDURE — 83036 HEMOGLOBIN GLYCOSYLATED A1C: CPT | Performed by: INTERNAL MEDICINE

## 2020-08-21 PROCEDURE — 99214 OFFICE O/P EST MOD 30 MIN: CPT | Performed by: INTERNAL MEDICINE

## 2020-08-21 PROCEDURE — 3051F HG A1C>EQUAL 7.0%<8.0%: CPT | Performed by: INTERNAL MEDICINE

## 2020-08-21 RX ORDER — SEMAGLUTIDE 1.34 MG/ML
1 INJECTION, SOLUTION SUBCUTANEOUS
Qty: 1 BOX | Refills: 11 | Status: SHIPPED | OUTPATIENT
Start: 2020-08-21 | End: 2021-07-30 | Stop reason: SDUPTHER

## 2020-08-21 NOTE — PROGRESS NOTES
Henrry Valenzuela is a 60 y.o. male here for   Chief Complaint   Patient presents with    Diabetes       1. Have you been to the ER, urgent care clinic since your last visit? Hospitalized since your last visit? -no    2. Have you seen or consulted any other health care providers outside of the [address] [place] [address] since your last visit? Include any pap smears or colon screening. -PCP    Order placed for pt per verbal order with read back from Dr. Reilly Rajput 08/21/20

## 2020-08-21 NOTE — LETTER
8/22/20 Patient: Cassandra Lobo YOB: 1965 Date of Visit: 8/21/2020 Navya Perez MD 
[address] [facility] [address] [place] [address] VIA Facsimile: [phone] Dear Navya Perez MD, Thank you for referring Mr. Tash Basurto to [address] [address] for evaluation. My notes for this consultation are attached. If you have questions, please do not hesitate to call me. I look forward to following your patient along with you. Sincerely, Amalia Banks MD

## 2020-08-21 NOTE — PROGRESS NOTES
Juve Marie MD        Patient Information  Date:8/22/2020  Name : Rebecca Solo 60 y.o.     YOB: 1965         PCP: Veronica Haddad MD         Chief Complaint   Patient presents with    Diabetes       History of Present Illness: Rebecca Solo is a 60 y.o. male here for fu of  Type 2 Diabetes Mellitus. Self referred for evaluation and management of type 2 diabetes mellitus. He did not bring the meter, not checking  Taking the medications consistently  Tolerating Ozempic, patient requested this medicine  Has more muscle cramps since switching to simvastatin, was on atorvastatin in the past  On antiretrovirals  Also not able to drink much water    He was diagnosed with diabetes in 2013,        Oncologist is Dr Ebenezer Concepcion,         Wt Readings from Last 3 Encounters:   08/21/20 184 lb (83.5 kg)   08/12/20 185 lb 12.8 oz (84.3 kg)   01/23/20 190 lb 12.8 oz (86.5 kg)       BP Readings from Last 3 Encounters:   08/21/20 118/76   08/12/20 114/78   01/23/20 119/79           Past Medical History:   Diagnosis Date    [facility] immunodeficiency virus infection) [facility]     Prostate cancer ([facility]     Type II diabetes mellitus, uncontrolled ([facility]      Current Outpatient Medications   Medication Sig    ammonium lactate (LAC-HYDRIN) 12 % lotion Apply  to affected area as needed. rub in to affected area well    mzoppydhk-hymgxsyn-wpkuupq ala (Biktarvy) tab tablet Take 1 Tab by mouth daily.  terbinafine HCL (LAMISIL) 250 mg tablet Take 250 mg by mouth daily.  canagliflozin (Invokana) 300 mg tablet Take 1 Tab by mouth Daily (before breakfast). Stop Farxiga    Insulin Needles, Disposable, 32 gauge x 5/32\" ndle Use to inject ozempic weekly Dx Code: E11.65    metFORMIN (GLUCOPHAGE) 1,000 mg tablet Take 1 Tab by mouth two (2) times daily (with meals).  cholecalciferol (VITAMIN D3) 1,000 unit tablet Take  by mouth daily.     aspirin (ASPIRIN) 325 mg tablet Take 325 mg by mouth daily.  cinnamon bark 500 mg cap Take  by mouth.  semaglutide (Ozempic) 1 mg/dose (2 mg/1.5 mL) sub-q pen 1 mg by SubCUTAneous route every seven (7) days. Stop 0.5 mg    atorvastatin (LIPITOR) 20 mg tablet Take  by mouth daily. No current facility-administered medications for this visit. Allergies   Allergen Reactions    Azithromycin Myalgia    Sulfa (Sulfonamide Antibiotics) Nausea Only         Review of Systems:  - Review of all 10 systems negative other than mentioned in HPI     Physical Examination:   Blood pressure 118/76, pulse 96, temperature (!) 96 °F (35.6 °C), temperature source Oral, resp. rate 16, height 6' (1.829 m), weight 184 lb (83.5 kg), SpO2 97 %. Estimated body mass index is 24.95 kg/m² as calculated from the following:    Height as of this encounter: 6' (1.829 m). -   Weight as of this encounter: 184 lb (83.5 kg). - General: pleasant, no distress, good eye contact  - HEENT: no pallor, no periorbital edema, EOMI  - Neck: supple,   - Cardiovascular: regular, normal rate, normal S1 and S2,   - Respiratory: clear to auscultation bilaterally  - Gastrointestinal: soft, nontender, nondistended,  BS +  - Neurological: alert and oriented  - Psychiatric: normal mood and affect  - Skin: color, texture, turgor normal.     Diabetic foot exam: June 2019    Left:     Vibratory sensation normal    Filament test normal sensation with micro filament   Pulse DP: 1+    Deformities: None  Right:    Vibratory sensation normal   Filament test normal sensation with micro filament   Pulse DP: 1+   Deformities: None    Data Reviewed:         Cr nl     Assessment/Plan:     1. Type 2 diabetes mellitus with hyperglycemia, without long-term current use of insulin ([address]    2. Dyslipidemia    3. Essential hypertension        1.  Type 2 Diabetes Mellitus   Lab Results   Component Value Date/Time    Hemoglobin A1c (POC) 7.2 08/21/2020 03:52 PM    Hemoglobin A1c, External 8.9 10/25/2019   A1c 7.2, improved  Ozempic  Hydration  Metformin  Invokana  Discontinued glimepiride      2. HIV - Followed at [facility]     3 HTN -    4 Hyperlipidemia -   Statin, ID switched it to simvastatin from atorvastatin, he will check with ID and go back to atorvastatin      Prostate cancer -followed by oncologist    There are no Patient Instructions on file for this visit. Follow-up and Dispositions    · Return in about 4 months (around 12/21/2020) for labs before next visit and follow up. Thank you for allowing me to participate in the care of this patient.     Jacobo Apodaca MD      Patient verbalized understanding

## 2020-08-24 RX ORDER — AMMONIUM LACTATE 12 G/100G
4 LOTION TOPICAL 2 TIMES DAILY
Qty: 180 G | Refills: 5 | Status: SHIPPED | OUTPATIENT
Start: 2020-08-24

## 2020-08-24 NOTE — PROGRESS NOTES
Podiatry History and Physical    Subjective:         Patient is a 60 y.o. male who is being seen as a returning patient needing a diabetic foot exam and to discuss his toenail fungus. Patient states he is completed the oral medication that was prescribed last office visit. Patient states his diabetes has been under control he has close follow-up with his PCP. He denies any recent trauma. He denies any local/systemic signs infection. He denies any lower extremity complaints    Past Medical History:   Diagnosis Date    [facility]) [facility]     [facility]     Type II diabetes mellitus, uncontrolled ([facility]      Past Surgical History:   Procedure Laterality Date    HX CATARACT REMOVAL      HX PROSTATECTOMY         Family History   Problem Relation Age of Onset    Diabetes Mother     Diabetes Father       Social History     Tobacco Use    Smoking status: Never Smoker    Smokeless tobacco: Never Used   Substance Use Topics    Alcohol use: Yes     Alcohol/week: 0.0 standard drinks     Allergies   Allergen Reactions    Azithromycin Myalgia    Sulfa (Sulfonamide Antibiotics) Nausea Only     Prior to Admission medications    Medication Sig Start Date End Date Taking? Authorizing Provider   ammonium lactate (LAC-HYDRIN) 12 % lotion Apply  to affected area as needed. rub in to affected area well   Yes Provider, Historical   atorvastatin (LIPITOR) 20 mg tablet Take  by mouth daily. Yes Provider, Historical   rilokxybj-bboajpup-xxdrhdv ala (Biktarvy) tab tablet Take 1 Tab by mouth daily. Yes Provider, Historical   terbinafine HCL (LAMISIL) 250 mg tablet Take 250 mg by mouth daily. Yes Provider, Historical   canagliflozin (Invokana) 300 mg tablet Take 1 Tab by mouth Daily (before breakfast).  Stop Virl [place] 4/8/20  Yes Bushra Geller MD   Insulin Needles, Disposable, 32 gauge x 5/32\" ndle Use to inject ozempic weekly Dx Code: E11.65 10/22/19  Yes Bushra Geller MD   metFORMIN (GLUCOPHAGE) 1,000 mg tablet Take 1 Tab by mouth two (2) times daily (with meals). 10/22/19  Yes Raul Chacon MD   cholecalciferol (VITAMIN D3) 1,000 unit tablet Take  by mouth daily. Yes Provider, Historical   aspirin (ASPIRIN) 325 mg tablet Take 325 mg by mouth daily. Yes Provider, Historical   cinnamon bark 500 mg cap Take  by mouth. Yes Provider, Historical   semaglutide (Ozempic) 1 mg/dose (2 mg/1.5 mL) sub-q pen 1 mg by SubCUTAneous route every seven (7) days. Stop 0.5 mg 8/21/20   Raul Chacon MD       Review of Systems   Constitutional: Negative. HENT: Negative. Eyes: Negative. Respiratory: Negative. Cardiovascular: Negative. Gastrointestinal: Negative. Endocrine: Negative. Genitourinary: Negative. Musculoskeletal: Negative. Skin: Negative. Allergic/Immunologic: Positive for immunocompromised state. Neurological: Negative. Hematological: Negative. Psychiatric/Behavioral: Negative. All other systems reviewed and are negative. Objective:     Visit Vitals  /78 (BP 1 Location: Right arm, BP Patient Position: Sitting)   Pulse 88   Temp 99.3 °F (37.4 °C) (Temporal)   Ht 6' (1.829 m)   Wt 185 lb 12.8 oz (84.3 kg)   SpO2 97%   BMI 25.20 kg/m²       Physical Exam  Vitals signs reviewed. Constitutional:       Appearance: He is normal weight. HENT:      Head:      Comments: Normal dentition  Cardiovascular:      Pulses:           Dorsalis pedis pulses are 2+ on the right side and 2+ on the left side. Posterior tibial pulses are 2+ on the right side and 2+ on the left side. Pulmonary:      Effort: Pulmonary effort is normal.   Musculoskeletal:      Right lower leg: No edema. Left lower leg: No edema. Right foot: Normal range of motion. No deformity, bunion or Charcot foot. Left foot: Normal range of motion. No deformity, bunion or Charcot foot.    Feet:      Right foot:      Protective Sensation: 10 sites tested. 10 sites sensed. Skin integrity: Dry skin present. Toenail Condition: Right toenails are abnormally thick. Fungal disease present. Left foot:      Protective Sensation: 10 sites tested. 10 sites sensed. Skin integrity: Dry skin present. Toenail Condition: Left toenails are abnormally thick. Fungal disease present. Skin:     General: Skin is warm. Capillary Refill: Capillary refill takes 2 to 3 seconds. Neurological:      Mental Status: He is alert and oriented to person, place, and time. Psychiatric:         Mood and Affect: Mood and affect normal.         Behavior: Behavior is cooperative. Data Review: No results found for this or any previous visit (from the past 24 hour(s)). Impression:       ICD-10-CM ICD-9-CM    1. Xerosis cutis  L85.3 706.8    2. Type 2 diabetes mellitus with hyperglycemia, without long-term current use of insulin ([facility])  E11.65 250.00      790.29    3. Onychomycosis  B35.1 110.1          Recommendation:     Patient seen and evaluated in the office  Discussed and educated patient regarding their current medical condition. Instructed patient to monitor their feet daily, be compliant with all medications and wear supportive shoe gear. Sharp, aseptic toenail plate debridement performed to all 10 pedal digits with a nail nipper without incident.   Patient tolerated well and no dressing was needed  Prescription given for ammonium lactate 12% lotion and patient to apply to all affected skin twice daily        RTC in 3 to 6 months

## 2020-10-12 DIAGNOSIS — E11.65 TYPE 2 DIABETES MELLITUS WITH HYPERGLYCEMIA, WITHOUT LONG-TERM CURRENT USE OF INSULIN (HCC): ICD-10-CM

## 2020-10-12 RX ORDER — METFORMIN HYDROCHLORIDE 1000 MG/1
1000 TABLET ORAL 2 TIMES DAILY WITH MEALS
Qty: 180 TAB | Refills: 3 | Status: SHIPPED | OUTPATIENT
Start: 2020-10-12 | End: 2021-11-04

## 2020-12-07 DIAGNOSIS — I10 ESSENTIAL HYPERTENSION: ICD-10-CM

## 2020-12-07 DIAGNOSIS — E78.5 DYSLIPIDEMIA: ICD-10-CM

## 2020-12-07 DIAGNOSIS — E11.65 TYPE 2 DIABETES MELLITUS WITH HYPERGLYCEMIA, WITHOUT LONG-TERM CURRENT USE OF INSULIN (HCC): ICD-10-CM

## 2020-12-21 DIAGNOSIS — E11.65 TYPE 2 DIABETES MELLITUS WITH HYPERGLYCEMIA, WITHOUT LONG-TERM CURRENT USE OF INSULIN (HCC): Primary | ICD-10-CM

## 2021-03-01 ENCOUNTER — OFFICE VISIT (OUTPATIENT)
Dept: PODIATRY | Age: 56
End: 2021-03-01
Payer: COMMERCIAL

## 2021-03-01 ENCOUNTER — APPOINTMENT (OUTPATIENT)
Dept: GENERAL RADIOLOGY | Age: 56
End: 2021-03-01
Attending: PHYSICIAN ASSISTANT
Payer: COMMERCIAL

## 2021-03-01 ENCOUNTER — HOSPITAL ENCOUNTER (EMERGENCY)
Age: 56
Discharge: HOME | End: 2021-03-01
Payer: COMMERCIAL

## 2021-03-01 VITALS
DIASTOLIC BLOOD PRESSURE: 83 MMHG | SYSTOLIC BLOOD PRESSURE: 125 MMHG | HEART RATE: 112 BPM | RESPIRATION RATE: 16 BRPM | HEIGHT: 71 IN | WEIGHT: 175 LBS | BODY MASS INDEX: 24.5 KG/M2 | TEMPERATURE: 98.5 F | OXYGEN SATURATION: 100 %

## 2021-03-01 VITALS
OXYGEN SATURATION: 98 % | HEART RATE: 116 BPM | HEIGHT: 71 IN | SYSTOLIC BLOOD PRESSURE: 113 MMHG | DIASTOLIC BLOOD PRESSURE: 72 MMHG | BODY MASS INDEX: 24.5 KG/M2 | WEIGHT: 175 LBS | TEMPERATURE: 97.2 F

## 2021-03-01 DIAGNOSIS — S99.192A CLOSED FRACTURE OF BASE OF FIFTH METATARSAL BONE OF LEFT FOOT AT METAPHYSEAL-DIAPHYSEAL JUNCTION, INITIAL ENCOUNTER: Primary | ICD-10-CM

## 2021-03-01 DIAGNOSIS — E11.42 DIABETIC POLYNEUROPATHY ASSOCIATED WITH TYPE 2 DIABETES MELLITUS (HCC): ICD-10-CM

## 2021-03-01 DIAGNOSIS — S92.355A CLOSED NONDISPLACED FRACTURE OF FIFTH METATARSAL BONE OF LEFT FOOT, INITIAL ENCOUNTER: Primary | ICD-10-CM

## 2021-03-01 PROCEDURE — 99282 EMERGENCY DEPT VISIT SF MDM: CPT

## 2021-03-01 PROCEDURE — 28470 CLTX METATARSAL FX WO MNP EA: CPT | Performed by: PODIATRIST

## 2021-03-01 PROCEDURE — 73630 X-RAY EXAM OF FOOT: CPT

## 2021-03-01 PROCEDURE — 99214 OFFICE O/P EST MOD 30 MIN: CPT | Performed by: PODIATRIST

## 2021-03-01 RX ORDER — DULOXETIN HYDROCHLORIDE 60 MG/1
60 CAPSULE, DELAYED RELEASE ORAL DAILY
Qty: 30 CAP | Refills: 2 | Status: SHIPPED | OUTPATIENT
Start: 2021-03-01 | End: 2022-03-14

## 2021-03-01 RX ORDER — NAPROXEN 500 MG/1
500 TABLET ORAL 2 TIMES DAILY WITH MEALS
Qty: 20 TAB | Refills: 0 | Status: SHIPPED | OUTPATIENT
Start: 2021-03-01 | End: 2021-03-11

## 2021-03-01 NOTE — DISCHARGE INSTRUCTIONS
Thank you! Thank you for allowing me to care for you in the emergency department. I sincerely hope that you are satisfied with your visit today. It is my goal to provide you with excellent care. Below you will find a list of your labs and imaging from your visit today. Should you have any questions regarding these results please do not hesitate to call the emergency department. Labs -   No results found for this or any previous visit (from the past 12 hour(s)). Radiologic Studies -   XR FOOT LT MIN 3 V   Final Result   Nondisplaced fracture involving the proximal fifth metatarsal.        CT Results  (Last 48 hours)      None          CXR Results  (Last 48 hours)      None               If you feel that you have not received excellent quality care or timely care, please ask to speak to the nurse manager. Please choose us in the future for your continued health care needs. ------------------------------------------------------------------------------------------------------------  The exam and treatment you received in the Emergency Department were for an urgent problem and are not intended as complete care. It is important that you follow-up with a doctor, nurse practitioner, or physician assistant to:  (1) confirm your diagnosis,  (2) re-evaluation of changes in your illness and treatment, and  (3) for ongoing care. If your symptoms become worse or you do not improve as expected and you are unable to reach your usual health care provider, you should return to the Emergency Department. We are available 24 hours a day. Please take your discharge instructions with you when you go to your follow-up appointment. If you have any problem arranging a follow-up appointment, contact the Emergency Department immediately. If a prescription has been provided, please have it filled as soon as possible to prevent a delay in treatment.  Read the entire medication instruction sheet provided to you by the pharmacy. If you have any questions or reservations about taking the medication due to side effects or interactions with other medications, please call your primary care physician or contact the ER to speak with the charge nurse. Make an appointment with your family doctor or the physician you were referred to for follow-up of this visit as instructed on your discharge paperwork, as this is a mandatory follow-up. Return to the ER if you are unable to be seen or if you are unable to be seen in a timely manner. If you have any problem arranging the follow-up visit, contact the Emergency Department immediately.

## 2021-03-01 NOTE — ED PROVIDER NOTES
EMERGENCY DEPARTMENT HISTORY AND PHYSICAL EXAM      Date: 3/1/2021  Patient Name: Sariah Kincaid    History of Presenting Illness     Chief Complaint   Patient presents with    Foot Pain       History Provided By: Patient    HPI: Sairah Kincaid, 60 y.o. male with a past medical history significant HIV, prostate cancer, diabetes who presents to the ED with cc of sudden onset, intermittent pain to lateral aspect of L foot which started while he was walking today. Symptoms exacerbated with ambulation and alleviated with rest. No other associated symptoms. No fall or overt trauma. He reports he had an injury to the same area on this foot when he was 14years old, from ice skating. Denies any numbness, tingling, weakness. There are no other complaints, changes, or physical findings at this time. PCP: Maricel Duggan MD    No current facility-administered medications on file prior to encounter. Current Outpatient Medications on File Prior to Encounter   Medication Sig Dispense Refill    dapagliflozin (Farxiga) 10 mg tab tablet Take 1 Tab by mouth every morning. Stop Invokana 90 Tab 3    metFORMIN (GLUCOPHAGE) 1,000 mg tablet Take 1 Tab by mouth two (2) times daily (with meals). 180 Tab 3    ammonium lactate (LAC-HYDRIN) 12 % lotion Apply 4 g to affected area two (2) times a day. rub in to affected area well 180 g 5    semaglutide (Ozempic) 1 mg/dose (2 mg/1.5 mL) sub-q pen 1 mg by SubCUTAneous route every seven (7) days. Stop 0.5 mg 1 Box 11    atorvastatin (LIPITOR) 20 mg tablet Take  by mouth daily.  mjofyifbj-qpnzxjwf-yffnqmf ala (Biktarvy) tab tablet Take 1 Tab by mouth daily.  terbinafine HCL (LAMISIL) 250 mg tablet Take 250 mg by mouth daily.  Insulin Needles, Disposable, 32 gauge x 5/32\" ndle Use to inject ozempic weekly Dx Code: E11.65 100 Pen Needle 11    cholecalciferol (VITAMIN D3) 1,000 unit tablet Take  by mouth daily.       aspirin (ASPIRIN) 325 mg tablet Take 325 mg by mouth daily.      cinnamon bark 500 mg cap Take  by mouth. Past History     Past Medical History:  Past Medical History:   Diagnosis Date    [facility]     [facility]     Type II diabetes mellitus, uncontrolled ([facility]        Past Surgical History:  Past Surgical History:   Procedure Laterality Date    HX CATARACT REMOVAL      HX PROSTATECTOMY         Family History:  Family History   Problem Relation Age of Onset    Diabetes Mother     Diabetes Father        Social History:  Social History     Tobacco Use    Smoking status: Never Smoker    Smokeless tobacco: Never Used   Substance Use Topics    Alcohol use: Yes     Alcohol/week: 0.0 standard drinks    Drug use: Not on file       Allergies: Allergies   Allergen Reactions    Azithromycin Myalgia    Sulfa (Sulfonamide Antibiotics) Nausea Only         Review of Systems     Review of Systems   Constitutional: Negative for chills, fatigue and fever. HENT: Negative. Respiratory: Negative for cough, chest tightness, shortness of breath and wheezing. Cardiovascular: Negative for chest pain and palpitations. Gastrointestinal: Negative for abdominal pain, diarrhea, nausea and vomiting. Genitourinary: Negative for frequency and urgency. Musculoskeletal: Positive for arthralgias (L foot). Negative for back pain, neck pain and neck stiffness. Skin: Negative for rash. Neurological: Negative for dizziness, weakness, light-headedness and headaches. Psychiatric/Behavioral: Negative. All other systems reviewed and are negative. Physical Exam     Physical Exam  Vitals signs and nursing note reviewed. Constitutional:       General: He is not in acute distress. Appearance: Normal appearance. He is not ill-appearing or toxic-appearing. HENT:      Head: Normocephalic and atraumatic.       Nose: Nose normal.      Mouth/Throat:      Mouth: Mucous membranes are moist.   Eyes:      General: Lids are normal.      Conjunctiva/sclera:      Right eye: Right conjunctiva is not injected. Left eye: Left conjunctiva is not injected. Neck:      Musculoskeletal: Normal range of motion and neck supple. Cardiovascular:      Rate and Rhythm: Normal rate and regular rhythm. Heart sounds: No murmur. No friction rub. No gallop. Pulmonary:      Effort: Pulmonary effort is normal. No tachypnea or respiratory distress. Breath sounds: Normal breath sounds. No stridor or decreased air movement. Musculoskeletal: Normal range of motion. General: Tenderness present. No swelling, deformity or signs of injury. Right lower leg: No edema. Left lower leg: No edema. Comments: L foot: No obvious deformities. Tenderness over 5th metatarsal. FROM which reproduces tendernessg. 2+ DP pulse. Sensation intact distally. Capillary refill < 3 seconds. Skin:     General: Skin is warm. Capillary Refill: Capillary refill takes less than 2 seconds. Neurological:      General: No focal deficit present. Mental Status: He is alert and oriented to person, place, and time. Mental status is at baseline. Psychiatric:         Mood and Affect: Mood normal.         Behavior: Behavior is cooperative. Thought Content: Thought content normal.         Judgment: Judgment normal.         Lab and Diagnostic Study Results     Labs -   No results found for this or any previous visit (from the past 12 hour(s)). Radiologic Studies -   XR Results (most recent):  Results from Hospital Encounter encounter on 03/01/21   XR FOOT LT MIN 3 V    Narrative 3 views left foot    HISTORY: Pain    There is a transverse nondisplaced fracture involving the proximal shaft of the  fifth metatarsal. The remaining bones of the foot are intact. Joint spaces of  the foot are maintained.       Impression Nondisplaced fracture involving the proximal fifth metatarsal.         Medical Decision Making   - I am the first provider for this patient. - I reviewed the vital signs, available nursing notes, past medical history, past surgical history, family history and social history. - Initial assessment performed. The patients presenting problems have been discussed, and they are in agreement with the care plan formulated and outlined with them. I have encouraged them to ask questions as they arise throughout their visit. Vital Signs-Reviewed the patient's vital signs. Patient Vitals for the past 12 hrs:   Temp Pulse Resp BP SpO2   03/01/21 1048 98.5 °F (36.9 °C) (!) 112 16 125/83 100 %       Records Reviewed: Nursing Notes and Old Medical Records    The patient presents with L foot pain with a differential diagnosis of fracture, strain, contusion      ED Course:          Provider Notes (Medical Decision Making):     MDM  Number of Diagnoses or Management Options  Closed nondisplaced fracture of fifth metatarsal bone of left foot, initial encounter  Diagnosis management comments:     60year old male with nontraumatic L foot pain. XR revealing nondisplaced fracture of 5th metatarsal of L foot. Patient is NVI. Placed in splint and given crutches. Discussed NSAIDs and RICE at home for symptomatic relief. Provided with orthopedics follow up. Return precautions discussed. Amount and/or Complexity of Data Reviewed  Tests in the radiology section of CPT®: ordered and reviewed  Review and summarize past medical records: yes    Patient Progress  Patient progress: stable           Disposition   Disposition: DC- Adult Discharges: All of the diagnostic tests were reviewed and questions answered. Diagnosis, care plan and treatment options were discussed. The patient understands the instructions and will follow up as directed. The patients results have been reviewed with them. They have been counseled regarding their diagnosis.   The patient verbally convey understanding and agreement of the signs, symptoms, diagnosis, treatment and prognosis and additionally agrees to follow up as recommended with their PCP in 24 - 48 hours. They also agree with the care-plan and convey that all of their questions have been answered. I have also put together some discharge instructions for them that include: 1) educational information regarding their diagnosis, 2) how to care for their diagnosis at home, as well a 3) list of reasons why they would want to return to the ED prior to their follow-up appointment, should their condition change. Discharged    DISCHARGE PLAN:  1. Cannot display discharge medications since this patient is not currently admitted. 2.   Follow-up Information     Follow up With Specialties Details Why Contact Info    Berry Patricio MD Orthopedic Surgery Schedule an appointment as soon as possible for a visit   [phone] [place] [id number] [phone]      [facility] EMERGENCY DEPT Emergency Medicine  As needed, If symptoms worsen [address] [address] [phone]        3. Return to ED if worse   4. Discharge Medication List as of 3/1/2021 11:26 AM      START taking these medications    Details   naproxen (Naprosyn) 500 mg tablet Take 1 Tab by mouth two (2) times daily (with meals) for 10 days. , Normal, Disp-20 Tab, R-0         CONTINUE these medications which have NOT CHANGED    Details   dapagliflozin (Farxiga) 10 mg tab tablet Take 1 Tab by mouth every morning. Stop Invokana, Normal, Disp-90 Tab, R-3      metFORMIN (GLUCOPHAGE) 1,000 mg tablet Take 1 Tab by mouth two (2) times daily (with meals). , Normal, Disp-180 Tab, R-3      ammonium lactate (LAC-HYDRIN) 12 % lotion Apply 4 g to affected area two (2) times a day. rub in to affected area well, Normal, Disp-180 g,R-5      semaglutide (Ozempic) 1 mg/dose (2 mg/1.5 mL) sub-q pen 1 mg by SubCUTAneous route every seven (7) days.  Stop 0.5 mg, Normal, Disp-1 Box,R-11      atorvastatin (LIPITOR) 20 mg tablet Take  by mouth daily. , Historical Med      lfsqpjiwa-jbsxfiba-yxgnnia ala (Biktarvy) tab tablet Take 1 Tab by mouth daily. , Historical Med      terbinafine HCL (LAMISIL) 250 mg tablet Take 250 mg by mouth daily. , Historical Med      Insulin Needles, Disposable, 32 gauge x 5/32\" ndle Use to inject ozempic weekly Dx Code: E11.65, Normal, Disp-100 Pen Needle, R-11      cholecalciferol (VITAMIN D3) 1,000 unit tablet Take  by mouth daily. , Historical Med      aspirin (ASPIRIN) 325 mg tablet Take 325 mg by mouth daily. , Historical Med      cinnamon bark 500 mg cap Take  by mouth., Historical Med               Diagnosis     Clinical Impression:   1. Closed nondisplaced fracture of fifth metatarsal bone of left foot, initial encounter        Attestations:    TREVA Miller    Please note that this dictation was completed with [employer], the computer voice recognition software. Quite often unanticipated grammatical, syntax, homophones, and other interpretive errors are inadvertently transcribed by the computer software. Please disregard these errors. Please excuse any errors that have escaped final proofreading. Thank you.

## 2021-03-01 NOTE — PROGRESS NOTES
[facility]    Subjective:         Patient is a 60 y.o. male who is being seen as a returning patient with an acute complaint of pain to his left foot and a chronic complaint of numbness/tingling in his feet. Patient states he was walking and felt a pop in his left foot. He has diabetic peripheral neuropathy, thus he did not feel immediate pain. The area started to swell so he presented to the [facility] emergency department. X-rays were taken and he was noted to have a fracture. He was referred to our office for further work-up and management. He states he is currently having pain at rest level of 4-10 to the lateral aspect of his left foot. He denies any breaks in the skin. He denies any local/systemic signs of infection. He denies any other lower extremity complaints    Past Medical History:   Diagnosis Date    [facility] infection) [facility]     Prostate cancer ([facility]     Type II diabetes mellitus, uncontrolled ([facility]      Past Surgical History:   Procedure Laterality Date    HX CATARACT REMOVAL      HX PROSTATECTOMY         Family History   Problem Relation Age of Onset    Diabetes Mother     Diabetes Father       Social History     Tobacco Use    Smoking status: Never Smoker    Smokeless tobacco: Never Used   Substance Use Topics    Alcohol use: Yes     Alcohol/week: 0.0 standard drinks     Allergies   Allergen Reactions    Azithromycin Myalgia    Sulfa (Sulfonamide Antibiotics) Nausea Only     Prior to Admission medications    Medication Sig Start Date End Date Taking? Authorizing Provider   naproxen (Naprosyn) 500 mg tablet Take 1 Tab by mouth two (2) times daily (with meals) for 10 days. 3/1/21 3/11/21 Yes Rico Pizano PA   dapagliflozin Danyel Waters) 10 mg tab tablet Take 1 Tab by mouth every morning.  Stop Invokana 12/21/20  Yes Gavin Brooks MD   metFORMIN (GLUCOPHAGE) 1,000 mg tablet Take 1 Tab by mouth two (2) times daily (with meals). 10/12/20  Yes Iker Naik MD   ammonium lactate (LAC-HYDRIN) 12 % lotion Apply 4 g to affected area two (2) times a day. rub in to affected area well 8/24/20  Yes Shaista Duong DPM   semaglutide (Ozempic) 1 mg/dose (2 mg/1.5 mL) sub-q pen 1 mg by SubCUTAneous route every seven (7) days. Stop 0.5 mg 8/21/20  Yes Iker Naik MD   atorvastatin (LIPITOR) 20 mg tablet Take  by mouth daily. Yes Provider, Historical   kyrndpxnq-vxwulquk-flulsrf ala (Biktarvy) tab tablet Take 1 Tab by mouth daily. Yes Provider, Historical   terbinafine HCL (LAMISIL) 250 mg tablet Take 250 mg by mouth daily. Yes Provider, Historical   Insulin Needles, Disposable, 32 gauge x 5/32\" ndle Use to inject ozempic weekly Dx Code: E11.65 10/22/19  Yes Iker Naik MD   cholecalciferol (VITAMIN D3) 1,000 unit tablet Take  by mouth daily. Yes Provider, Historical   aspirin (ASPIRIN) 325 mg tablet Take 325 mg by mouth daily. Yes Provider, Historical   cinnamon bark 500 mg cap Take  by mouth. Yes Provider, Historical       Review of Systems   Constitutional: Negative. HENT: Negative. Eyes: Negative. Respiratory: Negative. Cardiovascular: Negative. Gastrointestinal: Negative. Endocrine: Negative. Genitourinary: Negative. Musculoskeletal: Negative. Skin: Negative. Allergic/Immunologic: Positive for immunocompromised state. Neurological: Positive for numbness. Hematological: Negative. Psychiatric/Behavioral: Negative. All other systems reviewed and are negative. Objective:     Visit Vitals  /72 (BP 1 Location: Left upper arm, BP Patient Position: Sitting, BP Cuff Size: Adult)   Pulse (!) 116   Temp 97.2 °F (36.2 °C) (Temporal)   Ht 5' 11\" (1.803 m)   Wt 175 lb (79.4 kg)   SpO2 98%   BMI 24.41 kg/m²       Physical Exam  Vitals signs reviewed. Constitutional:       Appearance: He is normal weight.    Cardiovascular:      Pulses:           Dorsalis pedis pulses are 2+ on the right side and 2+ on the left side. Posterior tibial pulses are 2+ on the right side and 2+ on the left side. Pulmonary:      Effort: Pulmonary effort is normal.   Musculoskeletal:      Right lower leg: No edema. Left lower leg: Edema (Very mild to the lateral aspect of the left foot) present. Right foot: Normal range of motion. No deformity or bunion. Left foot: Normal range of motion. No deformity or bunion. Feet:      Right foot:      Protective Sensation: 10 sites tested. 0 sites sensed. Skin integrity: Skin integrity normal.      Toenail Condition: Right toenails are normal.      Left foot:      Protective Sensation: 10 sites tested. 0 sites sensed. Skin integrity: Warmth present. Toenail Condition: Left toenails are normal.   Lymphadenopathy:      Lower Body: No right inguinal adenopathy. No left inguinal adenopathy. Skin:     General: Skin is warm. Capillary Refill: Capillary refill takes 2 to 3 seconds. Neurological:      Mental Status: He is alert and oriented to person, place, and time. Psychiatric:         Mood and Affect: Mood and affect normal.         Behavior: Behavior is cooperative. Impression:       ICD-10-CM ICD-9-CM    1. Closed fracture of base of fifth metatarsal bone of left foot at metaphyseal-diaphyseal junction, initial encounter  S99.192A 825.25 AMB SUPPLY ORDER   2. Diabetic peripheral neuropathy E11.42      Recommendation:     Patient seen and evaluated in the office  Discussed educated patient regarding his current medical condition. Personally reviewed x-ray images of the left foot that were taken in the emergency department and discussed findings with patient. There is a nondisplaced, transverse, extra-articular fracture to the proximal aspect of the left fifth metatarsal.  This is a Moon fracture.   Instructed patient that there is very limited blood flow to this area, which complicates healing and often requires surgical intervention. Patient is HIV positive and will be undergoing treatments for prostate cancer in the near future, coupled with his diabetes, these comorbidities will make fracture healing difficult. Patient verbalized understanding and would like to avoid surgery at all cost.  Patient was fitted with a tall cam boot and instructed to utilize during all periods of ambulation/weightbearing. Patient to follow-up in 6 weeks for repeat x-rays to evaluate fracture healing  Lastly, an in-depth conversation was had regarding his diabetic peripheral neuropathy.   Medication management was discussed and patient has elected for Cymbalta 60 mg daily to assist with symptomatic relief

## 2021-03-01 NOTE — PROGRESS NOTES
1. Have you been to the ER, urgent care clinic since your last visit? Hospitalized since your last visit? Yes Where: [address]    2. Have you seen or consulted any other health care providers outside of the [address] [place] [address] since your last visit? Include any pap smears or colon screening.  Yes Where: [facility]     Chief Complaint   Patient presents with    Foot Pain     L foot injury

## 2021-03-02 DIAGNOSIS — S99.192A CLOSED FRACTURE OF BASE OF FIFTH METATARSAL BONE OF LEFT FOOT AT METAPHYSEAL-DIAPHYSEAL JUNCTION, INITIAL ENCOUNTER: Primary | ICD-10-CM

## 2021-03-18 ENCOUNTER — TELEPHONE (OUTPATIENT)
Dept: ENDOCRINOLOGY | Age: 56
End: 2021-03-18

## 2021-03-18 NOTE — TELEPHONE ENCOUNTER
Informed pt per Dr Cutris Iqbal to check BG 2-3 times daily and call office if he notices low BG as there is a drug interaction between Vika and metformin per Nicolas's. Pt states he will check today as he has an appt tomorrow as well.

## 2021-03-19 ENCOUNTER — OFFICE VISIT (OUTPATIENT)
Dept: ENDOCRINOLOGY | Age: 56
End: 2021-03-19
Payer: COMMERCIAL

## 2021-03-19 VITALS
RESPIRATION RATE: 16 BRPM | HEART RATE: 91 BPM | OXYGEN SATURATION: 98 % | DIASTOLIC BLOOD PRESSURE: 70 MMHG | SYSTOLIC BLOOD PRESSURE: 111 MMHG | BODY MASS INDEX: 24.41 KG/M2 | TEMPERATURE: 98 F | HEIGHT: 71 IN

## 2021-03-19 DIAGNOSIS — E78.5 DYSLIPIDEMIA: ICD-10-CM

## 2021-03-19 DIAGNOSIS — E11.65 TYPE 2 DIABETES MELLITUS WITH HYPERGLYCEMIA, WITHOUT LONG-TERM CURRENT USE OF INSULIN (HCC): ICD-10-CM

## 2021-03-19 DIAGNOSIS — E11.65 TYPE 2 DIABETES MELLITUS WITH HYPERGLYCEMIA, WITHOUT LONG-TERM CURRENT USE OF INSULIN (HCC): Primary | ICD-10-CM

## 2021-03-19 DIAGNOSIS — I10 ESSENTIAL HYPERTENSION: ICD-10-CM

## 2021-03-19 PROCEDURE — 99214 OFFICE O/P EST MOD 30 MIN: CPT | Performed by: INTERNAL MEDICINE

## 2021-03-19 RX ORDER — ASCORBIC ACID 500 MG
1000 TABLET ORAL DAILY
COMMUNITY
End: 2022-03-14

## 2021-03-19 RX ORDER — SIMVASTATIN 40 MG/1
40 TABLET, FILM COATED ORAL
COMMUNITY
End: 2022-03-14

## 2021-03-19 RX ORDER — FISH OIL/DHA/EPA 1200-144MG
CAPSULE ORAL DAILY
COMMUNITY
End: 2022-03-14

## 2021-03-19 NOTE — LETTER
3/20/2021 Patient: Valerie Eng YOB: 1965 Date of Visit: 3/19/2021 Evans Ortega MD 
[facility] [address] [address] Via Fax: [phone] Dear Evans Ortega MD, Thank you for referring Mr. Ron Trevino to [address] [place] [address] for evaluation. My notes for this consultation are attached. If you have questions, please do not hesitate to call me. I look forward to following your patient along with you. Sincerely, David Piedra MD

## 2021-03-19 NOTE — PROGRESS NOTES
Samira Cervantes is a 60 y.o. male here for   Chief Complaint   Patient presents with    Diabetes       1. Have you been to the ER, urgent care clinic since your last visit? Hospitalized since your last visit? -[facility] 3/1/21 for foot pain/ fx toe    2. Have you seen or consulted any other health care providers outside of the [address] [place] [address] since your last visit?   Include any pap smears or colon screening.-no    Has had both COVID vaccines

## 2021-03-19 NOTE — PROGRESS NOTES
Sole Ocampo MD        Patient Information  Name : Marcie Villafana 60 y.o.   YOB: 1965         PCP: Aleksandr Solo MD         Chief Complaint   Patient presents with    Diabetes       History of Present Illness: Marcei Villafana is a 60 y.o. male here for fu of  Type 2 Diabetes Mellitus. Self referred for evaluation and management of type 2 diabetes mellitus. He did not bring the meter, on average fasting is ranging from 116-140  No hypoglycemia  Taking the medications consistently  Tolerating Ozempic  On antiretrovirals  Sustained left toe fracture, in the boot    He was diagnosed with diabetes in 2013,        Oncologist is Dr Peyton Diaz,         Wt Readings from Last 3 Encounters:   03/01/21 175 lb (79.4 kg)   03/01/21 175 lb (79.4 kg)   08/21/20 184 lb (83.5 kg)       BP Readings from Last 3 Encounters:   03/01/21 113/72   03/01/21 125/83   08/21/20 118/76           Past Medical History:   Diagnosis Date    [facility] virus infection) ([facility]     Prostate cancer ([facility]     Type II diabetes mellitus, uncontrolled ([facility]      Current Outpatient Medications   Medication Sig    simvastatin (ZOCOR) 40 mg tablet Take 40 mg by mouth nightly.  Lactobac no.41/Bifidobact no.7 (PROBIOTIC-10 PO) Take  by mouth.  ascorbic acid, vitamin C, (Vitamin C) 500 mg tablet Take 1,000 mg by mouth daily.  zinc 50 mg tab tablet Take  by mouth daily.  fish oil-dha-epa 1,200-144-216 mg cap Take  by mouth daily.  DULoxetine (CYMBALTA) 60 mg capsule Take 1 Cap by mouth daily.  dapagliflozin (Farxiga) 10 mg tab tablet Take 1 Tab by mouth every morning. Stop Invokana    metFORMIN (GLUCOPHAGE) 1,000 mg tablet Take 1 Tab by mouth two (2) times daily (with meals).  ammonium lactate (LAC-HYDRIN) 12 % lotion Apply 4 g to affected area two (2) times a day.  rub in to affected area well    semaglutide (Ozempic) 1 mg/dose (2 mg/1.5 mL) sub-q pen 1 mg by SubCUTAneous route every seven (7) days. Stop 0.5 mg    fnhrynapq-zkvjklau-sfalfii ala (Biktarvy) tab tablet Take 1 Tab by mouth daily.  Insulin Needles, Disposable, 32 gauge x 5/32\" ndle Use to inject ozempic weekly Dx Code: E11.65    cholecalciferol (VITAMIN D3) 1,000 unit tablet Take  by mouth daily.  aspirin (ASPIRIN) 325 mg tablet Take 81 mg by mouth daily.  cinnamon bark 500 mg cap Take  by mouth.  atorvastatin (LIPITOR) 20 mg tablet Take  by mouth daily.  terbinafine HCL (LAMISIL) 250 mg tablet Take 250 mg by mouth daily. No current facility-administered medications for this visit. Allergies   Allergen Reactions    Azithromycin Myalgia    Sulfa (Sulfonamide Antibiotics) Nausea Only         Review of Systems:  - Review of all 10 systems negative other than mentioned in HPI     Physical Examination:   Height 5' 11\" (1.803 m). Estimated body mass index is 24.41 kg/m² as calculated from the following:    Height as of this encounter: 5' 11\" (1.803 m). -   Weight as of 3/1/21: 175 lb (79.4 kg). - General: pleasant, no distress, good eye contact  - HEENT: no pallor, no periorbital edema, EOMI  - Neck: supple,   - Cardiovascular: regular, normal rate, normal S1 and S2,   - Respiratory: clear to auscultation bilaterally  -   - Neurological: alert and oriented  - Psychiatric: normal mood and affect  - Skin: color, texture, turgor normal.         Data Reviewed:         Jc nazario     Assessment/Plan:     1. Type 2 diabetes mellitus with hyperglycemia, without long-term current use of insulin ([address]    2. Essential hypertension    3. Dyslipidemia        1. Type 2 Diabetes Mellitus   Lab Results   Component Value Date/Time    Hemoglobin A1c (POC) 7.2 08/21/2020 03:52 PM    Hemoglobin A1c, External 8.9 10/25/2019   A1c 7.1 March 2021  Ozempic  Hydration  Metformin  Invokana  No hypoglycemia, advised to monitor blood glucose closely,   Discontinued glimepiride      2.   HIV - Followed at [facility]     3 HTN -    4 Hyperlipidemia -   Statin, ID switched it to simvastatin from atorvastatin, he will check with ID and go back to atorvastatin      Prostate cancer -followed by oncologist    There are no Patient Instructions on file for this visit. Thank you for allowing me to participate in the care of this patient.     Joseph Simental MD      Patient verbalized understanding

## 2021-03-29 ENCOUNTER — TELEPHONE (OUTPATIENT)
Dept: PODIATRY | Age: 56
End: 2021-03-29

## 2021-03-29 DIAGNOSIS — T14.8XXA FRACTURE: Primary | ICD-10-CM

## 2021-03-30 ENCOUNTER — HOSPITAL ENCOUNTER (OUTPATIENT)
Dept: GENERAL RADIOLOGY | Age: 56
Discharge: HOME | End: 2021-03-30

## 2021-03-30 DIAGNOSIS — T14.8XXA FRACTURE: ICD-10-CM

## 2021-03-31 ENCOUNTER — OFFICE VISIT (OUTPATIENT)
Dept: PODIATRY | Age: 56
End: 2021-03-31
Payer: COMMERCIAL

## 2021-03-31 VITALS
TEMPERATURE: 96.9 F | HEART RATE: 93 BPM | BODY MASS INDEX: 24.5 KG/M2 | DIASTOLIC BLOOD PRESSURE: 73 MMHG | WEIGHT: 175 LBS | SYSTOLIC BLOOD PRESSURE: 118 MMHG | OXYGEN SATURATION: 100 % | HEIGHT: 71 IN

## 2021-03-31 DIAGNOSIS — S99.192D CLOSED FRACTURE OF BASE OF FIFTH METATARSAL BONE OF LEFT FOOT AT METAPHYSEAL-DIAPHYSEAL JUNCTION WITH ROUTINE HEALING, SUBSEQUENT ENCOUNTER: Primary | ICD-10-CM

## 2021-03-31 PROCEDURE — 99024 POSTOP FOLLOW-UP VISIT: CPT | Performed by: PODIATRIST

## 2021-03-31 NOTE — PROGRESS NOTES
1. Have you been to the ER, urgent care clinic since your last visit? Hospitalized since your last visit? No    2. Have you seen or consulted any other health care providers outside of the [address] [place] [address] since your last visit? Include any pap smears or colon screening.  No     Chief Complaint   Patient presents with    Foot Injury     L foot fracture;review x-ray results

## 2021-04-01 NOTE — PROGRESS NOTES
Patient seen status post Moon fracture to the base of his left fifth metatarsal.  Repeat x-rays taken and personally reviewed. Noting continued alignment and interval fracture healing. Patient to continue with his cam boot and ultrasonic bone stimulator.   He is to return in 6 weeks for repeat x-rays

## 2021-04-21 ENCOUNTER — OFFICE VISIT (OUTPATIENT)
Dept: PODIATRY | Age: 56
End: 2021-04-21
Payer: COMMERCIAL

## 2021-04-21 VITALS
HEART RATE: 107 BPM | HEIGHT: 71 IN | OXYGEN SATURATION: 98 % | SYSTOLIC BLOOD PRESSURE: 130 MMHG | TEMPERATURE: 96.8 F | DIASTOLIC BLOOD PRESSURE: 81 MMHG | BODY MASS INDEX: 26.49 KG/M2 | WEIGHT: 189.2 LBS

## 2021-04-21 DIAGNOSIS — L85.9 HYPERKERATOSIS: Primary | ICD-10-CM

## 2021-04-21 PROCEDURE — 99212 OFFICE O/P EST SF 10 MIN: CPT | Performed by: PODIATRIST

## 2021-04-28 PROBLEM — L85.9 HYPERKERATOSIS: Status: ACTIVE | Noted: 2021-04-28

## 2021-04-28 NOTE — PROGRESS NOTES
[facility]    Subjective:         Patient is a 60 y.o. male who is being seen as a returning patient with an acute complaint of pain to his left foot with callus formation. He states he has been compliant with his tall walking boot but states he now has dense calluses forming to his feet due to the altered biomechanics caused by the boot. He admits to mild pain, rising to level 3 at 10. He states the pain is exacerbated with weightbearing. He denies any recent trauma, other than the inciting event that caused him to go into the boot. He denies any breaks in the skin. He denies any local/systemic signs infection. He denies any other pedal complaints    Past Medical History:   Diagnosis Date    [facility] immunodeficiency virus infection[facility]     Prostate cancer ([facility]     [facility]      Past Surgical History:   Procedure Laterality Date    HX CATARACT REMOVAL      HX PROSTATECTOMY         Family History   Problem Relation Age of Onset    Diabetes Mother     Diabetes Father       Social History     Tobacco Use    Smoking status: Never Smoker    Smokeless tobacco: Never Used   Substance Use Topics    Alcohol use: Yes     Alcohol/week: 0.0 standard drinks     Allergies   Allergen Reactions    Azithromycin Myalgia    Sulfa (Sulfonamide Antibiotics) Nausea Only     Prior to Admission medications    Medication Sig Start Date End Date Taking? Authorizing Provider   simvastatin (ZOCOR) 40 mg tablet Take 40 mg by mouth nightly. Provider, Historical   Lactobac no.41/Bifidobact no.7 (PROBIOTIC-10 PO) Take  by mouth. Provider, Historical   ascorbic acid, vitamin C, (Vitamin C) 500 mg tablet Take 1,000 mg by mouth daily. Provider, Historical   zinc 50 mg tab tablet Take  by mouth daily. Provider, Historical   fish oil-dha-epa 1,200-144-216 mg cap Take  by mouth daily.     Provider, Historical   dapagliflozin (Farxiga) 10 mg tab tablet Take 1 Tab by mouth every morning. Stop Invokana 3/19/21   Danay Hayward MD   DULoxetine (CYMBALTA) 60 mg capsule Take 1 Cap by mouth daily. 3/1/21   Ron Philippe DPM   metFORMIN (GLUCOPHAGE) 1,000 mg tablet Take 1 Tab by mouth two (2) times daily (with meals). 10/12/20   Danay Hayward MD   ammonium lactate (LAC-HYDRIN) 12 % lotion Apply 4 g to affected area two (2) times a day. rub in to affected area well 8/24/20   Ron Philippe DPM   semaglutide (Ozempic) 1 mg/dose (2 mg/1.5 mL) sub-q pen 1 mg by SubCUTAneous route every seven (7) days. Stop 0.5 mg 8/21/20   Danay Hayward MD   ibwssyled-eozkklsu-mxamqff ala (Biktarvy) tab tablet Take 1 Tab by mouth daily. Provider, Historical   terbinafine HCL (LAMISIL) 250 mg tablet Take 250 mg by mouth daily. Provider, Historical   Insulin Needles, Disposable, 32 gauge x 5/32\" ndle Use to inject ozempic weekly Dx Code: E11.65 10/22/19   Danay Hayward MD   cholecalciferol (VITAMIN D3) 1,000 unit tablet Take  by mouth daily. Provider, Historical   aspirin (ASPIRIN) 325 mg tablet Take 81 mg by mouth daily. Provider, Historical   cinnamon bark 500 mg cap Take  by mouth. Provider, Historical       Review of Systems   Constitutional: Negative. HENT: Negative. Eyes: Negative. Respiratory: Negative. Cardiovascular: Negative. Gastrointestinal: Negative. Endocrine: Negative. Genitourinary: Negative. Musculoskeletal: Negative. Skin: Negative. Allergic/Immunologic: Positive for immunocompromised state. Neurological: Positive for numbness. Hematological: Negative. Psychiatric/Behavioral: Negative. All other systems reviewed and are negative.       Objective:     Visit Vitals  /81 (BP 1 Location: Right upper arm, BP Patient Position: Sitting, BP Cuff Size: Small adult)   Pulse (!) 107   Temp 96.8 °F (36 °C) (Temporal)   Ht 5' 11\" (1.803 m)   Wt 189 lb 3.2 oz (85.8 kg)   SpO2 98%   BMI 26.39 kg/m² Physical Exam  Vitals signs reviewed. Constitutional:       Appearance: He is normal weight. Cardiovascular:      Pulses:           Dorsalis pedis pulses are 2+ on the right side and 2+ on the left side. Posterior tibial pulses are 2+ on the right side and 2+ on the left side. Pulmonary:      Effort: Pulmonary effort is normal.   Musculoskeletal:      Right lower leg: No edema. Left lower leg: Edema (Very mild to the lateral aspect of the left foot) present. Right foot: Normal range of motion. No deformity or bunion. Left foot: Normal range of motion. No deformity or bunion. Feet:      Right foot:      Protective Sensation: 10 sites tested. 0 sites sensed. Skin integrity: Skin integrity normal.      Toenail Condition: Right toenails are normal.      Left foot:      Protective Sensation: 10 sites tested. 0 sites sensed. Skin integrity: Callus present. Toenail Condition: Left toenails are normal.   Lymphadenopathy:      Lower Body: No right inguinal adenopathy. No left inguinal adenopathy. Skin:     General: Skin is warm. Capillary Refill: Capillary refill takes 2 to 3 seconds. Neurological:      Mental Status: He is alert and oriented to person, place, and time. Psychiatric:         Mood and Affect: Mood and affect normal.         Behavior: Behavior is cooperative. Impression:       ICD-10-CM ICD-9-CM    1. Hyperkeratosis  L85.9 701.1          Recommendation:     Patient seen and evaluated in the office  Discussed educated patient regarding his current medical condition. Instructed patient he needed to limit focal pressure and shear forces to prevent the calluses from returning. Modification made to Cam boot to offload areas of concern  Patient continue with strict use of the cam boot for fracture healing.   He is also to continue with his bone stimulator for fracture healing

## 2021-05-03 ENCOUNTER — TELEPHONE (OUTPATIENT)
Dept: PODIATRY | Age: 56
End: 2021-05-03

## 2021-05-03 DIAGNOSIS — S92.352D FRACTURE OF BASE OF FIFTH METATARSAL BONE OF LEFT FOOT WITH ROUTINE HEALING, SUBSEQUENT ENCOUNTER: Primary | ICD-10-CM

## 2021-05-05 ENCOUNTER — DOCUMENTATION ONLY (OUTPATIENT)
Dept: PODIATRY | Age: 56
End: 2021-05-05

## 2021-05-13 ENCOUNTER — OFFICE VISIT (OUTPATIENT)
Dept: PODIATRY | Age: 56
End: 2021-05-13
Payer: COMMERCIAL

## 2021-05-13 VITALS
OXYGEN SATURATION: 99 % | HEART RATE: 99 BPM | WEIGHT: 189 LBS | SYSTOLIC BLOOD PRESSURE: 124 MMHG | HEIGHT: 71 IN | TEMPERATURE: 98 F | DIASTOLIC BLOOD PRESSURE: 74 MMHG | BODY MASS INDEX: 26.46 KG/M2

## 2021-05-13 DIAGNOSIS — S99.192D CLOSED FRACTURE OF BASE OF FIFTH METATARSAL BONE OF LEFT FOOT AT METAPHYSEAL-DIAPHYSEAL JUNCTION WITH ROUTINE HEALING, SUBSEQUENT ENCOUNTER: Primary | ICD-10-CM

## 2021-05-13 PROCEDURE — 99024 POSTOP FOLLOW-UP VISIT: CPT | Performed by: PODIATRIST

## 2021-05-13 NOTE — PROGRESS NOTES
1. Have you been to the ER, urgent care clinic since your last visit? Hospitalized since your last visit? No    2. Have you seen or consulted any other health care providers outside of the [address] [place] [address] since your last visit? Include any pap smears or colon screening.  No     Chief Complaint   Patient presents with    Foot Injury     recheck L foot fracture

## 2021-05-19 DIAGNOSIS — S92.352D FRACTURE OF BASE OF FIFTH METATARSAL BONE OF LEFT FOOT WITH ROUTINE HEALING, SUBSEQUENT ENCOUNTER: ICD-10-CM

## 2021-05-26 NOTE — PROGRESS NOTES
[facility]    Subjective:         Patient is a 60 y.o. male who is being seen as a returning patient continued care regarding the Moon fracture to his left foot. Patient states he has ambulated only with the protection of his tall walking boot. He states he is utilize the exigent bone stimulator daily as instructed. He denies any associated pain due to his diabetic peripheral neuropathy. He denies any recent trauma. She denies any breaks in skin. He denies any breaks in the skin. He denies any local/systemic signs infection. He denies any other pedal complaints    Past Medical History:   Diagnosis Date    [facility]) ([facility]     Prostate cancer ([facility]     [facility]      Past Surgical History:   Procedure Laterality Date    HX CATARACT REMOVAL      HX PROSTATECTOMY         Family History   Problem Relation Age of Onset    Diabetes Mother     Diabetes Father       Social History     Tobacco Use    Smoking status: Never Smoker    Smokeless tobacco: Never Used   Substance Use Topics    Alcohol use: Yes     Alcohol/week: 0.0 standard drinks     Allergies   Allergen Reactions    Azithromycin Myalgia    Sulfa (Sulfonamide Antibiotics) Nausea Only     Prior to Admission medications    Medication Sig Start Date End Date Taking? Authorizing Provider   simvastatin (ZOCOR) 40 mg tablet Take 40 mg by mouth nightly. Provider, Historical   Lactobac no.41/Bifidobact no.7 (PROBIOTIC-10 PO) Take  by mouth. Provider, Historical   ascorbic acid, vitamin C, (Vitamin C) 500 mg tablet Take 1,000 mg by mouth daily. Provider, Historical   zinc 50 mg tab tablet Take  by mouth daily. Provider, Historical   fish oil-dha-epa 1,200-144-216 mg cap Take  by mouth daily. Provider, Historical   dapagliflozin (Farxiga) 10 mg tab tablet Take 1 Tab by mouth every morning.  Stop Invokana 3/19/21   Tracy Yun MD DULoxetine (CYMBALTA) 60 mg capsule Take 1 Cap by mouth daily. 3/1/21   Jagdish Philippe DPM   metFORMIN (GLUCOPHAGE) 1,000 mg tablet Take 1 Tab by mouth two (2) times daily (with meals). 10/12/20   Terrilee Mohs, MD   ammonium lactate (LAC-HYDRIN) 12 % lotion Apply 4 g to affected area two (2) times a day. rub in to affected area well 8/24/20   Jagdish Philippe DPM   semaglutide (Ozempic) 1 mg/dose (2 mg/1.5 mL) sub-q pen 1 mg by SubCUTAneous route every seven (7) days. Stop 0.5 mg 8/21/20   Terrilee Mohs, MD   hzxebivxb-fftodfla-oxnopbu ala (Biktarvy) tab tablet Take 1 Tab by mouth daily. Provider, Historical   terbinafine HCL (LAMISIL) 250 mg tablet Take 250 mg by mouth daily. Provider, Historical   Insulin Needles, Disposable, 32 gauge x 5/32\" ndle Use to inject ozempic weekly Dx Code: E11.65 10/22/19   Terrilee Mohs, MD   cholecalciferol (VITAMIN D3) 1,000 unit tablet Take  by mouth daily. Provider, Historical   aspirin (ASPIRIN) 325 mg tablet Take 81 mg by mouth daily. Provider, Historical   cinnamon bark 500 mg cap Take  by mouth. Provider, Historical       Review of Systems   Constitutional: Negative. HENT: Negative. Eyes: Negative. Respiratory: Negative. Cardiovascular: Negative. Gastrointestinal: Negative. Endocrine: Negative. Genitourinary: Negative. Musculoskeletal: Negative. Skin: Negative. Allergic/Immunologic: Positive for immunocompromised state. Neurological: Positive for numbness. Hematological: Negative. Psychiatric/Behavioral: Negative. All other systems reviewed and are negative. Objective:     Visit Vitals  /74 (BP 1 Location: Left upper arm, BP Patient Position: Sitting, BP Cuff Size: Adult)   Pulse 99   Temp 98 °F (36.7 °C) (Temporal)   Ht 5' 11\" (1.803 m)   Wt 189 lb (85.7 kg)   SpO2 99%   BMI 26.36 kg/m²       Physical Exam  Vitals reviewed. Constitutional:       Appearance: He is normal weight. Cardiovascular:      Pulses:           Dorsalis pedis pulses are 2+ on the right side and 2+ on the left side. Posterior tibial pulses are 2+ on the right side and 2+ on the left side. Pulmonary:      Effort: Pulmonary effort is normal.   Musculoskeletal:      Right lower leg: No edema. Left lower leg: Edema (Very mild to the lateral aspect of the left foot) present. Right foot: Normal range of motion. No deformity or bunion. Left foot: Normal range of motion. No deformity or bunion. Feet:      Right foot:      Protective Sensation: 10 sites tested. 0 sites sensed. Skin integrity: Skin integrity normal.      Toenail Condition: Right toenails are normal.      Left foot:      Protective Sensation: 10 sites tested. 0 sites sensed. Skin integrity: Callus present. Toenail Condition: Left toenails are normal.   Lymphadenopathy:      Lower Body: No right inguinal adenopathy. No left inguinal adenopathy. Skin:     General: Skin is warm. Capillary Refill: Capillary refill takes 2 to 3 seconds. Neurological:      Mental Status: He is alert and oriented to person, place, and time. Psychiatric:         Mood and Affect: Mood and affect normal.         Behavior: Behavior is cooperative. Impression:       ICD-10-CM ICD-9-CM    1. Closed fracture of base of fifth metatarsal bone of left foot at metaphyseal-diaphyseal junction with routine healing, subsequent encounter  S99.192D V54.16          Recommendation:     Patient seen and evaluated in the office  Discussed educated patient regarding his current medical condition. Instructed patient he needed to limit focal pressure and shear forces to prevent the calluses from returning. Modification made to Cam boot to offload areas of concern  Patient continue with strict use of the cam boot for fracture healing.   He is also to continue with his bone stimulator for fracture healing

## 2021-06-24 ENCOUNTER — OFFICE VISIT (OUTPATIENT)
Dept: PODIATRY | Age: 56
End: 2021-06-24
Payer: COMMERCIAL

## 2021-06-24 VITALS
DIASTOLIC BLOOD PRESSURE: 71 MMHG | HEIGHT: 71 IN | OXYGEN SATURATION: 98 % | WEIGHT: 187.2 LBS | SYSTOLIC BLOOD PRESSURE: 119 MMHG | HEART RATE: 90 BPM | BODY MASS INDEX: 26.21 KG/M2 | TEMPERATURE: 97.1 F

## 2021-06-24 DIAGNOSIS — S99.192G CLOSED FRACTURE OF BASE OF FIFTH METATARSAL BONE OF LEFT FOOT AT METAPHYSEAL-DIAPHYSEAL JUNCTION WITH DELAYED HEALING, SUBSEQUENT ENCOUNTER: Primary | ICD-10-CM

## 2021-06-24 PROCEDURE — 99213 OFFICE O/P EST LOW 20 MIN: CPT | Performed by: PODIATRIST

## 2021-06-24 NOTE — PROGRESS NOTES
1. Have you been to the ER, urgent care clinic since your last visit? Hospitalized since your last visit? No    2. Have you seen or consulted any other health care providers outside of the [address] [place] [address] since your last visit? Include any pap smears or colon screening.  No     Chief Complaint   Patient presents with    Results     discuss x-ray results

## 2021-07-01 NOTE — PROGRESS NOTES
[facility]    Subjective:         Patient is a 60 y.o. male who is being seen as a returning patient continued care regarding the Moon fracture to his left foot. Patient states he has ambulated only with the protection of his tall walking boot. He states he is utilize the exigent bone stimulator daily as instructed. He denies any associated pain due to his diabetic peripheral neuropathy. He denies any recent trauma. He denies any breaks in skin. He denies any breaks in the skin. He denies any local/systemic signs infection. He denies any other pedal complaints    Past Medical History:   Diagnosis Date    [facility]) [facility]     Prostate cancer ([facility]     Type II [facility] mellitus, uncontrolled [facility]      Past Surgical History:   Procedure Laterality Date    HX CATARACT REMOVAL      HX PROSTATECTOMY         Family History   Problem Relation Age of Onset    Diabetes Mother     Diabetes Father       Social History     Tobacco Use    Smoking status: Never Smoker    Smokeless tobacco: Never Used   Substance Use Topics    Alcohol use: Yes     Alcohol/week: 0.0 standard drinks     Allergies   Allergen Reactions    Azithromycin Myalgia    Sulfa (Sulfonamide Antibiotics) Nausea Only     Prior to Admission medications    Medication Sig Start Date End Date Taking? Authorizing Provider   simvastatin (ZOCOR) 40 mg tablet Take 40 mg by mouth nightly. Yes Provider, Historical   Lactobac no.41/Bifidobact no.7 (PROBIOTIC-10 PO) Take  by mouth. Yes Provider, Historical   ascorbic acid, vitamin C, (Vitamin C) 500 mg tablet Take 1,000 mg by mouth daily. Yes Provider, Historical   zinc 50 mg tab tablet Take  by mouth daily. Yes Provider, Historical   fish oil-dha-epa 1,200-144-216 mg cap Take  by mouth daily. Yes Provider, Historical   dapagliflozin (Farxiga) 10 mg tab tablet Take 1 Tab by mouth every morning.  Stop Invokana 3/19/21  Yes Anastasia Camarillo Annabella Velasquez MD   DULoxetine (CYMBALTA) 60 mg capsule Take 1 Cap by mouth daily. 3/1/21  Yes Denisse Philippe DPM   metFORMIN (GLUCOPHAGE) 1,000 mg tablet Take 1 Tab by mouth two (2) times daily (with meals). 10/12/20  Yes Michael Carpenter MD   ammonium lactate (LAC-HYDRIN) 12 % lotion Apply 4 g to affected area two (2) times a day. rub in to affected area well 8/24/20  Yes Hung Quick DPM   semaglutide (Ozempic) 1 mg/dose (2 mg/1.5 mL) sub-q pen 1 mg by SubCUTAneous route every seven (7) days. Stop 0.5 mg 8/21/20  Yes Michael Carpenter MD   biqwxpfkz-zksmzslw-acfshgt ala (Biktarvy) tab tablet Take 1 Tab by mouth daily. Yes Provider, Historical   terbinafine HCL (LAMISIL) 250 mg tablet Take 250 mg by mouth daily. Yes Provider, Historical   Insulin Needles, Disposable, 32 gauge x 5/32\" ndle Use to inject ozempic weekly Dx Code: E11.65 10/22/19  Yes Michael Carpenter MD   cholecalciferol (VITAMIN D3) 1,000 unit tablet Take  by mouth daily. Yes Provider, Historical   aspirin (ASPIRIN) 325 mg tablet Take 81 mg by mouth daily. Yes Provider, Historical   cinnamon bark 500 mg cap Take  by mouth. Yes Provider, Historical       Review of Systems   Constitutional: Negative. HENT: Negative. Eyes: Negative. Respiratory: Negative. Cardiovascular: Negative. Gastrointestinal: Negative. Endocrine: Negative. Genitourinary: Negative. Musculoskeletal: Negative. Skin: Negative. Allergic/Immunologic: Positive for immunocompromised state. Neurological: Positive for numbness. Hematological: Negative. Psychiatric/Behavioral: Negative. All other systems reviewed and are negative. Objective:     Visit Vitals  /71 (BP 1 Location: Right upper arm, BP Patient Position: Sitting, BP Cuff Size: Adult)   Pulse 90   Temp 97.1 °F (36.2 °C) (Temporal)   Ht 5' 11\" (1.803 m)   Wt 187 lb 3.2 oz (84.9 kg)   SpO2 98%   BMI 26.11 kg/m²       Physical Exam  Vitals reviewed. Constitutional:       Appearance: He is normal weight. Cardiovascular:      Pulses:           Dorsalis pedis pulses are 2+ on the right side and 2+ on the left side. Posterior tibial pulses are 2+ on the right side and 2+ on the left side. Pulmonary:      Effort: Pulmonary effort is normal.   Musculoskeletal:      Right lower leg: No edema. Left lower leg: Edema (Very mild to the lateral aspect of the left foot) present. Right foot: Normal range of motion. No deformity or bunion. Left foot: Normal range of motion. No deformity or bunion. Feet:      Right foot:      Protective Sensation: 10 sites tested. 0 sites sensed. Skin integrity: Skin integrity normal.      Toenail Condition: Right toenails are normal.      Left foot:      Protective Sensation: 10 sites tested. 0 sites sensed. Skin integrity: Callus present. Toenail Condition: Left toenails are normal.   Lymphadenopathy:      Lower Body: No right inguinal adenopathy. No left inguinal adenopathy. Skin:     General: Skin is warm. Capillary Refill: Capillary refill takes 2 to 3 seconds. Neurological:      Mental Status: He is alert and oriented to person, place, and time. Psychiatric:         Mood and Affect: Mood and affect normal.         Behavior: Behavior is cooperative. Impression:       ICD-10-CM ICD-9-CM    1. Closed fracture of base of fifth metatarsal bone of left foot at metaphyseal-diaphyseal junction with delayed healing, subsequent encounter  S99.192G V54.16          Recommendation:     Patient seen and evaluated in the office  Discussed educated patient regarding his current medical condition. Instructed patient he needed to limit focal pressure and shear forces to prevent the calluses from returning. Modification made to Cam boot to offload areas of concern  Patient continue with strict use of the cam boot for fracture healing.   He is also to continue with his bone stimulator for fracture healing

## 2021-07-30 DIAGNOSIS — E11.65 TYPE 2 DIABETES MELLITUS WITH HYPERGLYCEMIA, WITHOUT LONG-TERM CURRENT USE OF INSULIN (HCC): ICD-10-CM

## 2021-07-30 RX ORDER — SEMAGLUTIDE 1.34 MG/ML
1 INJECTION, SOLUTION SUBCUTANEOUS
Qty: 1 BOX | Refills: 11 | Status: SHIPPED | OUTPATIENT
Start: 2021-07-30 | End: 2022-03-14 | Stop reason: SDUPTHER

## 2021-08-05 ENCOUNTER — OFFICE VISIT (OUTPATIENT)
Dept: PODIATRY | Age: 56
End: 2021-08-05
Payer: COMMERCIAL

## 2021-08-05 VITALS
TEMPERATURE: 97.8 F | BODY MASS INDEX: 25.9 KG/M2 | HEIGHT: 71 IN | WEIGHT: 185 LBS | HEART RATE: 114 BPM | SYSTOLIC BLOOD PRESSURE: 114 MMHG | DIASTOLIC BLOOD PRESSURE: 76 MMHG | OXYGEN SATURATION: 98 %

## 2021-08-05 DIAGNOSIS — S99.192G CLOSED FRACTURE OF BASE OF FIFTH METATARSAL BONE OF LEFT FOOT AT METAPHYSEAL-DIAPHYSEAL JUNCTION WITH DELAYED HEALING, SUBSEQUENT ENCOUNTER: Primary | ICD-10-CM

## 2021-08-05 PROCEDURE — 99212 OFFICE O/P EST SF 10 MIN: CPT | Performed by: PODIATRIST

## 2021-08-05 NOTE — PROGRESS NOTES
Chief Complaint   Patient presents with    Foot Injury     pt stateshe is here to f/u with L foot fracture     1. Have you been to the ER, urgent care clinic since your last visit? Hospitalized since your last visit? No    2. Have you seen or consulted any other health care providers outside of the [address] [place] [address] since your last visit? Include any pap smears or colon screening.  No  PCP-Dr Surjit Wilcox

## 2021-08-23 NOTE — PROGRESS NOTES
[facility]    Subjective:         Patient is a 60 y.o. male who is being seen as a returning patient continued care regarding the Moon fracture to his left foot. Patient states he has ambulated only with the protection of his tall walking boot. He states he is utilize the exogen bone stimulator daily as instructed. He denies any associated pain due to his diabetic peripheral neuropathy. He denies any recent trauma. He denies any breaks in skin. He denies any breaks in the skin. He denies any local/systemic signs infection. He denies any other pedal complaints    Past Medical History:   Diagnosis Date    [facility]) [facility]     Prostate cancer ([facility]     [facility]      Past Surgical History:   Procedure Laterality Date    HX CATARACT REMOVAL      HX PROSTATECTOMY         Family History   Problem Relation Age of Onset    Diabetes Mother     Diabetes Father       Social History     Tobacco Use    Smoking status: Never Smoker    Smokeless tobacco: Never Used   Substance Use Topics    Alcohol use: Yes     Alcohol/week: 0.0 standard drinks     Allergies   Allergen Reactions    Azithromycin Myalgia    Sulfa (Sulfonamide Antibiotics) Nausea Only     Prior to Admission medications    Medication Sig Start Date End Date Taking? Authorizing Provider   semaglutide (Ozempic) 1 mg/dose (2 mg/1.5 mL) sub-q pen 1 mg by SubCUTAneous route every seven (7) days. Stop 0.5 mg 7/30/21   Arthtaurus Mathews MD   simvastatin (ZOCOR) 40 mg tablet Take 40 mg by mouth nightly. Provider, Historical   Lactobac no.41/Bifidobact no.7 (PROBIOTIC-10 PO) Take  by mouth. Provider, Historical   ascorbic acid, vitamin C, (Vitamin C) 500 mg tablet Take 1,000 mg by mouth daily. Provider, Historical   zinc 50 mg tab tablet Take  by mouth daily. Provider, Historical   fish oil-dha-epa 1,200-144-216 mg cap Take  by mouth daily. Provider, Historical   dapagliflozin (Farxiga) 10 mg tab tablet Take 1 Tab by mouth every morning. Stop Invokana 3/19/21   Marco Pierce MD   DULoxetine (CYMBALTA) 60 mg capsule Take 1 Cap by mouth daily. 3/1/21   Natalie Philippe DPM   metFORMIN (GLUCOPHAGE) 1,000 mg tablet Take 1 Tab by mouth two (2) times daily (with meals). 10/12/20   Marco Pierce MD   ammonium lactate (LAC-HYDRIN) 12 % lotion Apply 4 g to affected area two (2) times a day. rub in to affected area well 8/24/20   Natalie Philippe DPM   xwngedzhb-qdibvhtt-zssjiqq ala (Biktarvy) tab tablet Take 1 Tab by mouth daily. Provider, Historical   terbinafine HCL (LAMISIL) 250 mg tablet Take 250 mg by mouth daily. Provider, Historical   Insulin Needles, Disposable, 32 gauge x 5/32\" ndle Use to inject ozempic weekly Dx Code: E11.65 10/22/19   Marco Pierce MD   cholecalciferol (VITAMIN D3) 1,000 unit tablet Take  by mouth daily. Provider, Historical   aspirin (ASPIRIN) 325 mg tablet Take 81 mg by mouth daily. Provider, Historical   cinnamon bark 500 mg cap Take  by mouth. Provider, Historical       Review of Systems   Constitutional: Negative. HENT: Negative. Eyes: Negative. Respiratory: Negative. Cardiovascular: Negative. Gastrointestinal: Negative. Endocrine: Negative. Genitourinary: Negative. Musculoskeletal: Negative. Skin: Negative. Allergic/Immunologic: Positive for immunocompromised state. Neurological: Positive for numbness. Hematological: Negative. Psychiatric/Behavioral: Negative. All other systems reviewed and are negative. Objective:     Visit Vitals  /76 (BP 1 Location: Right upper arm, BP Patient Position: Sitting, BP Cuff Size: Large adult)   Pulse (!) 114   Temp 97.8 °F (36.6 °C) (Temporal)   Ht 5' 11\" (1.803 m)   Wt 185 lb (83.9 kg)   SpO2 98%   BMI 25.80 kg/m²       Physical Exam  Vitals reviewed.    Constitutional:       Appearance: He is normal weight. Cardiovascular:      Pulses:           Dorsalis pedis pulses are 2+ on the right side and 2+ on the left side. Posterior tibial pulses are 2+ on the right side and 2+ on the left side. Pulmonary:      Effort: Pulmonary effort is normal.   Musculoskeletal:      Right lower leg: No edema. Left lower leg: Edema (Very mild to the lateral aspect of the left foot) present. Right foot: Normal range of motion. No deformity or bunion. Left foot: Normal range of motion. No deformity or bunion. Feet:      Right foot:      Protective Sensation: 10 sites tested. 0 sites sensed. Skin integrity: Skin integrity normal.      Toenail Condition: Right toenails are normal.      Left foot:      Protective Sensation: 10 sites tested. 0 sites sensed. Skin integrity: Callus present. Toenail Condition: Left toenails are normal.   Lymphadenopathy:      Lower Body: No right inguinal adenopathy. No left inguinal adenopathy. Skin:     General: Skin is warm. Capillary Refill: Capillary refill takes 2 to 3 seconds. Neurological:      Mental Status: He is alert and oriented to person, place, and time. Psychiatric:         Mood and Affect: Mood and affect normal.         Behavior: Behavior is cooperative. Impression:       ICD-10-CM ICD-9-CM    1. Closed fracture of base of fifth metatarsal bone of left foot at metaphyseal-diaphyseal junction with delayed healing, subsequent encounter  S99.192G V54.16          Recommendation:     Patient seen and evaluated in the office  Discussed educated patient regarding his current medical condition. Personally reviewed x-ray images of the left foot and discussed findings with patient, noting a 90% healed fracture to the base of the fifth metatarsal.  Patient may discontinue his walking boot at this time with protected ambulation in a very cushioned sneaker. Pt to initiate home PT and will give a referral if needed        Eleon SELLERS Ericka Cueto, [facility], [address] [place] [address] and [facility]  [address] [place] [address]  O: [phone]  F: [phone]  C: [phone]

## 2021-09-28 ENCOUNTER — TELEPHONE (OUTPATIENT)
Dept: PODIATRY | Age: 56
End: 2021-09-28

## 2021-09-28 DIAGNOSIS — S99.192G CLOSED FRACTURE OF BASE OF FIFTH METATARSAL BONE OF LEFT FOOT AT METAPHYSEAL-DIAPHYSEAL JUNCTION WITH DELAYED HEALING, SUBSEQUENT ENCOUNTER: Primary | ICD-10-CM

## 2021-11-03 DIAGNOSIS — S99.192G CLOSED FRACTURE OF BASE OF FIFTH METATARSAL BONE OF LEFT FOOT AT METAPHYSEAL-DIAPHYSEAL JUNCTION WITH DELAYED HEALING, SUBSEQUENT ENCOUNTER: Primary | ICD-10-CM

## 2021-11-04 ENCOUNTER — OFFICE VISIT (OUTPATIENT)
Dept: PODIATRY | Age: 56
End: 2021-11-04
Payer: COMMERCIAL

## 2021-11-04 VITALS
TEMPERATURE: 97.7 F | HEIGHT: 71 IN | BODY MASS INDEX: 26.1 KG/M2 | WEIGHT: 186.4 LBS | SYSTOLIC BLOOD PRESSURE: 129 MMHG | DIASTOLIC BLOOD PRESSURE: 75 MMHG | HEART RATE: 96 BPM

## 2021-11-04 DIAGNOSIS — E11.65 TYPE 2 DIABETES MELLITUS WITH HYPERGLYCEMIA, WITHOUT LONG-TERM CURRENT USE OF INSULIN (HCC): ICD-10-CM

## 2021-11-04 DIAGNOSIS — S99.192G CLOSED FRACTURE OF BASE OF FIFTH METATARSAL BONE OF LEFT FOOT AT METAPHYSEAL-DIAPHYSEAL JUNCTION WITH DELAYED HEALING, SUBSEQUENT ENCOUNTER: Primary | ICD-10-CM

## 2021-11-04 PROCEDURE — 99212 OFFICE O/P EST SF 10 MIN: CPT | Performed by: PODIATRIST

## 2021-11-04 RX ORDER — METFORMIN HYDROCHLORIDE 1000 MG/1
TABLET ORAL
Qty: 180 TABLET | Refills: 3 | Status: SHIPPED | OUTPATIENT
Start: 2021-11-04 | End: 2021-11-05 | Stop reason: SDUPTHER

## 2021-11-04 NOTE — PROGRESS NOTES
1. Have you been to the ER, urgent care clinic since your last visit? Hospitalized since your last visit? No    2. Have you seen or consulted any other health care providers outside of the [address] [place] [address] since your last visit? Include any pap smears or colon screening.  No     Chief Complaint   Patient presents with    Follow-up     recheck L foot fracture    Results     x-ray results    Callouses

## 2021-11-05 DIAGNOSIS — E11.65 TYPE 2 DIABETES MELLITUS WITH HYPERGLYCEMIA, WITHOUT LONG-TERM CURRENT USE OF INSULIN (HCC): ICD-10-CM

## 2021-11-05 RX ORDER — METFORMIN HYDROCHLORIDE 1000 MG/1
1000 TABLET ORAL 2 TIMES DAILY WITH MEALS
Qty: 180 TABLET | Refills: 3 | Status: SHIPPED | OUTPATIENT
Start: 2021-11-05 | End: 2022-03-14 | Stop reason: SDUPTHER

## 2021-11-10 ENCOUNTER — OFFICE VISIT (OUTPATIENT)
Dept: ENDOCRINOLOGY | Age: 56
End: 2021-11-10
Payer: COMMERCIAL

## 2021-11-10 VITALS
HEIGHT: 71 IN | OXYGEN SATURATION: 97 % | WEIGHT: 188 LBS | TEMPERATURE: 98.3 F | HEART RATE: 106 BPM | BODY MASS INDEX: 26.32 KG/M2 | RESPIRATION RATE: 16 BRPM | DIASTOLIC BLOOD PRESSURE: 75 MMHG | SYSTOLIC BLOOD PRESSURE: 116 MMHG

## 2021-11-10 DIAGNOSIS — E11.65 TYPE 2 DIABETES MELLITUS WITH HYPERGLYCEMIA, UNSPECIFIED WHETHER LONG TERM INSULIN USE (HCC): Primary | ICD-10-CM

## 2021-11-10 DIAGNOSIS — E78.2 MIXED HYPERLIPIDEMIA: ICD-10-CM

## 2021-11-10 LAB — HBA1C MFR BLD HPLC: 7.8 %

## 2021-11-10 PROCEDURE — 83036 HEMOGLOBIN GLYCOSYLATED A1C: CPT | Performed by: INTERNAL MEDICINE

## 2021-11-10 PROCEDURE — 99214 OFFICE O/P EST MOD 30 MIN: CPT | Performed by: INTERNAL MEDICINE

## 2021-11-10 PROCEDURE — 3051F HG A1C>EQUAL 7.0%<8.0%: CPT | Performed by: INTERNAL MEDICINE

## 2021-11-10 NOTE — LETTER
11/13/2021    Patient: Doylene Eisenmenger   YOB: 1965   Date of Visit: 11/10/2021     Tricia Quiñones MD  [facility]  [facility] [address] [facility] [address]  Via Fax: [phone]    Dear Tricia Quiñones MD,      Thank you for referring Mr. Mandy Ribeiro to [address] [place] [address] for evaluation. My notes for this consultation are attached. If you have questions, please do not hesitate to call me. I look forward to following your patient along with you.       Sincerely,    Otto Hernandez MD

## 2021-11-10 NOTE — PROGRESS NOTES
Tejas Paul MD        Patient Information  Name : Jim Tanner 60 y.o.   YOB: 1965         PCP: Dianna Duran MD         Chief Complaint   Patient presents with    Diabetes       History of Present Illness: Jim Tanner is a 60 y.o. male here for fu of  Type 2 Diabetes Mellitus. Self referred for evaluation and management of type 2 diabetes mellitus. He did not bring the meter, checking the blood glucose at home  No hypoglycemia  Taking the medications consistently  Tolerating Ozempic  On antiretrovirals      He was diagnosed with diabetes in 2013,        Oncologist is Dr Segundo Escoto,         Wt Readings from Last 3 Encounters:   11/10/21 188 lb (85.3 kg)   11/04/21 186 lb 6.4 oz (84.6 kg)   08/05/21 185 lb (83.9 kg)       BP Readings from Last 3 Encounters:   11/10/21 116/75   11/04/21 129/75   08/05/21 114/76           Past Medical History:   Diagnosis Date    [facility]) [facility]     Pro[facility] cancer ([facility]     Type II diabetes mellitus, uncontrolled ([facility]      Current Outpatient Medications   Medication Sig    metFORMIN (GLUCOPHAGE) 1,000 mg tablet Take 1 Tablet by mouth two (2) times daily (with meals).  semaglutide (Ozempic) 1 mg/dose (2 mg/1.5 mL) sub-q pen 1 mg by SubCUTAneous route every seven (7) days. Stop 0.5 mg    Insulin Needles, Disposable, 32 gauge x 5/32\" ndle Use to inject ozempic weekly Dx Code: E11.65    simvastatin (ZOCOR) 40 mg tablet Take 40 mg by mouth nightly.  Lactobac no.41/Bifidobact no.7 (PROBIOTIC-10 PO) Take  by mouth.  ascorbic acid, vitamin C, (Vitamin C) 500 mg tablet Take 1,000 mg by mouth daily.  zinc 50 mg tab tablet Take  by mouth daily.  fish oil-dha-epa 1,200-144-216 mg cap Take  by mouth daily.  dapagliflozin (Farxiga) 10 mg tab tablet Take 1 Tab by mouth every morning. Stop Invokana    DULoxetine (CYMBALTA) 60 mg capsule Take 1 Cap by mouth daily.     ammonium lactate (LAC-HYDRIN) 12 % lotion Apply 4 g to affected area two (2) times a day. rub in to affected area well    phsclywpe-czjchbaw-ihldcit ala (Biktarvy) tab tablet Take 1 Tab by mouth daily.  terbinafine HCL (LAMISIL) 250 mg tablet Take 250 mg by mouth daily.  cholecalciferol (VITAMIN D3) 1,000 unit tablet Take  by mouth daily.  aspirin (ASPIRIN) 325 mg tablet Take 81 mg by mouth daily.  cinnamon bark 500 mg cap Take  by mouth. No current facility-administered medications for this visit. Allergies   Allergen Reactions    Azithromycin Myalgia    Sulfa (Sulfonamide Antibiotics) Nausea Only         Review of Systems:  - Review of all 10 systems negative other than mentioned in HPI     Physical Examination:   Blood pressure 116/75, pulse (!) 106, temperature 98.3 °F (36.8 °C), temperature source Temporal, resp. rate 16, height 5' 11\" (1.803 m), weight 188 lb (85.3 kg), SpO2 97 %. Estimated body mass index is 26.22 kg/m² as calculated from the following:    Height as of this encounter: 5' 11\" (1.803 m). -   Weight as of this encounter: 188 lb (85.3 kg). - General: pleasant, no distress, good eye contact  - HEENT: no pallor, no periorbital edema, EOMI  - Neck: supple,   - Cardiovascular: regular, normal rate, normal S1 and S2,   - Respiratory: clear to auscultation bilaterally  -   - Neurological: alert and oriented  - Psychiatric: normal mood and affect  - Skin: color, texture, turgor normal.         Data Reviewed:         Cr nl     Assessment/Plan:     1. Type 2 diabetes mellitus with hyperglycemia, unspecified whether long term insulin use ([facility]        1.  Type 2 Diabetes Mellitus   Lab Results   Component Value Date/Time    Hemoglobin A1c (POC) 7.2 08/21/2020 03:52 PM    Hemoglobin A1c, External 8.9 10/25/2019 12:00 AM   A1c 7.1 March 2021, did not have any labs recently  Blood work today  Ozempic  Hydration  Metformin  Invokana  No hypoglycemia, advised to monitor blood glucose closely,   Discontinued glimepiride      2. HIV - Followed at [facility]     3 HTN -    4 Hyperlipidemia -   Statin, ID switched it to simvastatin from atorvastatin, he will check with ID and go back to atorvastatin      Prostate cancer -followed by oncologist    There are no Patient Instructions on file for this visit. Thank you for allowing me to participate in the care of this patient.     Adi Varghese MD      Patient verbalized understanding

## 2021-11-11 LAB
ALBUMIN SERPL-MCNC: 4.7 G/DL (ref 3.8–4.9)
ALBUMIN/CREAT UR: 10 MG/G CREAT (ref 0–29)
ALBUMIN/GLOB SERPL: 1.8 {RATIO} (ref 1.2–2.2)
ALP SERPL-CCNC: 87 IU/L (ref 44–121)
ALT SERPL-CCNC: 21 IU/L (ref 0–44)
AST SERPL-CCNC: 16 IU/L (ref 0–40)
BILIRUB SERPL-MCNC: 0.3 MG/DL (ref 0–1.2)
BUN SERPL-MCNC: 15 MG/DL (ref 6–24)
BUN/CREAT SERPL: 16 (ref 9–20)
CALCIUM SERPL-MCNC: 9.7 MG/DL (ref 8.7–10.2)
CHLORIDE SERPL-SCNC: 103 MMOL/L (ref 96–106)
CHOLEST SERPL-MCNC: 134 MG/DL (ref 100–199)
CO2 SERPL-SCNC: 27 MMOL/L (ref 20–29)
CREAT SERPL-MCNC: 0.93 MG/DL (ref 0.76–1.27)
CREAT UR-MCNC: 88.2 MG/DL
GLOBULIN SER CALC-MCNC: 2.6 G/DL (ref 1.5–4.5)
GLUCOSE SERPL-MCNC: 155 MG/DL (ref 65–99)
HDLC SERPL-MCNC: 37 MG/DL
IMP & REVIEW OF LAB RESULTS: NORMAL
LDLC SERPL CALC-MCNC: 81 MG/DL (ref 0–99)
MICROALBUMIN UR-MCNC: 9.1 UG/ML
POTASSIUM SERPL-SCNC: 4.2 MMOL/L (ref 3.5–5.2)
PROT SERPL-MCNC: 7.3 G/DL (ref 6–8.5)
SODIUM SERPL-SCNC: 143 MMOL/L (ref 134–144)
TRIGL SERPL-MCNC: 81 MG/DL (ref 0–149)
VLDLC SERPL CALC-MCNC: 16 MG/DL (ref 5–40)

## 2021-11-29 NOTE — PROGRESS NOTES
[facility]    Subjective:         Patient is a 60 y.o. male who is being seen as a returning patient continued care regarding the Moon fracture to his left foot. Patient has transitioned to a well padded sneaker. He states he has cont to utilize the exogen bone stimulator daily as instructed. He denies any associated pain due to his diabetic peripheral neuropathy. He denies any recent trauma. He denies any breaks in skin. He denies any breaks in the skin. He denies any local/systemic signs infection. He denies any other pedal complaints    Past Medical History:   Diagnosis Date    [facility] infection) [facility]     Prostate cancer ([facility]     Type II diabetes mellitus, uncontrolled ([facility]      Past Surgical History:   Procedure Laterality Date    HX CATARACT REMOVAL      HX PROSTATECTOMY         Family History   Problem Relation Age of Onset    Diabetes Mother     Diabetes Father       Social History     Tobacco Use    Smoking status: Never Smoker    Smokeless tobacco: Never Used   Substance Use Topics    Alcohol use: Yes     Alcohol/week: 0.0 standard drinks     Allergies   Allergen Reactions    Azithromycin Myalgia    Sulfa (Sulfonamide Antibiotics) Nausea Only     Prior to Admission medications    Medication Sig Start Date End Date Taking? Authorizing Provider   metFORMIN (GLUCOPHAGE) 1,000 mg tablet Take 1 Tablet by mouth two (2) times daily (with meals). 11/5/21   Terrence Watson MD   semaglutide (Ozempic) 1 mg/dose (2 mg/1.5 mL) sub-q pen 1 mg by SubCUTAneous route every seven (7) days. Stop 0.5 mg 7/30/21   Terrence Watson MD   simvastatin (ZOCOR) 40 mg tablet Take 40 mg by mouth nightly. Provider, Historical   Lactobac no.41/Bifidobact no.7 (PROBIOTIC-10 PO) Take  by mouth. Provider, Historical   ascorbic acid, vitamin C, (Vitamin C) 500 mg tablet Take 1,000 mg by mouth daily.     Provider, Historical   zinc 50 mg tab tablet Take  by mouth daily. Provider, Historical   fish oil-dha-epa 1,200-144-216 mg cap Take  by mouth daily. Provider, Historical   dapagliflozin (Farxiga) 10 mg tab tablet Take 1 Tab by mouth every morning. Stop Invokana 3/19/21   bJ Garcia MD   DULoxetine (CYMBALTA) 60 mg capsule Take 1 Cap by mouth daily. 3/1/21   Rich Philippe DPM   ammonium lactate (LAC-HYDRIN) 12 % lotion Apply 4 g to affected area two (2) times a day. rub in to affected area well 8/24/20   Rich Philippe DPM   cotygtivp-vjsvfibj-uipezym ala (Biktarvy) tab tablet Take 1 Tab by mouth daily. Provider, Historical   terbinafine HCL (LAMISIL) 250 mg tablet Take 250 mg by mouth daily. Provider, Historical   Insulin Needles, Disposable, 32 gauge x 5/32\" ndle Use to inject ozempic weekly Dx Code: E11.65 10/22/19   Jb Garcia MD   cholecalciferol (VITAMIN D3) 1,000 unit tablet Take  by mouth daily. Provider, Historical   aspirin (ASPIRIN) 325 mg tablet Take 81 mg by mouth daily. Provider, Historical   cinnamon bark 500 mg cap Take  by mouth. Provider, Historical       Review of Systems   Constitutional: Negative. HENT: Negative. Eyes: Negative. Respiratory: Negative. Cardiovascular: Negative. Gastrointestinal: Negative. Endocrine: Negative. Genitourinary: Negative. Musculoskeletal: Negative. Skin: Negative. Allergic/Immunologic: Positive for immunocompromised state. Neurological: Positive for numbness. Hematological: Negative. Psychiatric/Behavioral: Negative. All other systems reviewed and are negative. Objective:     Visit Vitals  /75 (BP 1 Location: Left upper arm, BP Patient Position: Sitting, BP Cuff Size: Adult)   Pulse 96   Temp 97.7 °F (36.5 °C) (Temporal)   Ht 5' 11\" (1.803 m)   Wt 186 lb 6.4 oz (84.6 kg)   BMI 26.00 kg/m²       Physical Exam  Vitals reviewed. Constitutional:       Appearance: He is normal weight.    Cardiovascular:      Pulses: Dorsalis pedis pulses are 2+ on the right side and 2+ on the left side. Posterior tibial pulses are 2+ on the right side and 2+ on the left side. Pulmonary:      Effort: Pulmonary effort is normal.   Musculoskeletal:      Right lower leg: No edema. Left lower leg: Edema (Very mild to the lateral aspect of the left foot) present. Right foot: Normal range of motion. No deformity or bunion. Left foot: Normal range of motion. No deformity or bunion. Feet:      Right foot:      Protective Sensation: 10 sites tested. 0 sites sensed. Skin integrity: Skin integrity normal.      Toenail Condition: Right toenails are normal.      Left foot:      Protective Sensation: 10 sites tested. 0 sites sensed. Skin integrity: Callus present. Toenail Condition: Left toenails are normal.   Lymphadenopathy:      Lower Body: No right inguinal adenopathy. No left inguinal adenopathy. Skin:     General: Skin is warm. Capillary Refill: Capillary refill takes 2 to 3 seconds. Neurological:      Mental Status: He is alert and oriented to person, place, and time. Psychiatric:         Mood and Affect: Mood and affect normal.         Behavior: Behavior is cooperative. Impression:       ICD-10-CM ICD-9-CM    1. Closed fracture of base of fifth metatarsal bone of left foot at metaphyseal-diaphyseal junction with delayed healing, subsequent encounter  S99.192G V54.16          Recommendation:     Patient seen and evaluated in the office  Discussed educated patient regarding his current medical condition. Personally reviewed x-ray images of the left foot and discussed findings with patient, noting a 99% healed fracture to the base of the fifth metatarsal.  Patient may continue in a very cushioned sneaker   Pt to [facility] home PT as he has done well thus far        Jeffrey Pope.  DONNY Philippe, CWSP, [facility] and [facility]  [address] [place] [address], [address] [place] [address]  O: [phone]  F: [phone]  C: [phone]

## 2022-02-04 DIAGNOSIS — E11.65 TYPE 2 DIABETES MELLITUS WITH HYPERGLYCEMIA, WITHOUT LONG-TERM CURRENT USE OF INSULIN (HCC): ICD-10-CM

## 2022-02-04 RX ORDER — DAPAGLIFLOZIN 10 MG/1
TABLET, FILM COATED ORAL
Qty: 30 TABLET | Refills: 9 | Status: SHIPPED | OUTPATIENT
Start: 2022-02-04 | End: 2022-03-14 | Stop reason: SDUPTHER

## 2022-03-08 LAB
BUN SERPL-MCNC: 17 MG/DL (ref 6–24)
BUN/CREAT SERPL: 16 (ref 9–20)
CALCIUM SERPL-MCNC: 10.1 MG/DL (ref 8.7–10.2)
CHLORIDE SERPL-SCNC: 100 MMOL/L (ref 96–106)
CO2 SERPL-SCNC: 22 MMOL/L (ref 20–29)
CREAT SERPL-MCNC: 1.05 MG/DL (ref 0.76–1.27)
EGFR: 82 ML/MIN/1.73
EST. AVERAGE GLUCOSE BLD GHB EST-MCNC: 203 MG/DL
GLUCOSE SERPL-MCNC: 152 MG/DL (ref 65–99)
HBA1C MFR BLD: 8.7 % (ref 4.8–5.6)
INTERPRETATION: NORMAL
LDLC SERPL DIRECT ASSAY-MCNC: 141 MG/DL (ref 0–99)
POTASSIUM SERPL-SCNC: 4.2 MMOL/L (ref 3.5–5.2)
SODIUM SERPL-SCNC: 141 MMOL/L (ref 134–144)

## 2022-03-14 ENCOUNTER — OFFICE VISIT (OUTPATIENT)
Dept: ENDOCRINOLOGY | Age: 57
End: 2022-03-14
Payer: COMMERCIAL

## 2022-03-14 VITALS
RESPIRATION RATE: 16 BRPM | HEIGHT: 71 IN | DIASTOLIC BLOOD PRESSURE: 74 MMHG | WEIGHT: 184 LBS | SYSTOLIC BLOOD PRESSURE: 110 MMHG | BODY MASS INDEX: 25.76 KG/M2 | TEMPERATURE: 98.5 F | OXYGEN SATURATION: 97 % | HEART RATE: 108 BPM

## 2022-03-14 DIAGNOSIS — E11.65 TYPE 2 DIABETES MELLITUS WITH HYPERGLYCEMIA, WITHOUT LONG-TERM CURRENT USE OF INSULIN (HCC): ICD-10-CM

## 2022-03-14 DIAGNOSIS — E11.65 TYPE 2 DIABETES MELLITUS WITH HYPERGLYCEMIA, WITHOUT LONG-TERM CURRENT USE OF INSULIN (HCC): Primary | ICD-10-CM

## 2022-03-14 DIAGNOSIS — I10 ESSENTIAL HYPERTENSION: ICD-10-CM

## 2022-03-14 DIAGNOSIS — E78.2 MIXED HYPERLIPIDEMIA: ICD-10-CM

## 2022-03-14 PROCEDURE — 3052F HG A1C>EQUAL 8.0%<EQUAL 9.0%: CPT | Performed by: INTERNAL MEDICINE

## 2022-03-14 PROCEDURE — 99214 OFFICE O/P EST MOD 30 MIN: CPT | Performed by: INTERNAL MEDICINE

## 2022-03-14 RX ORDER — METFORMIN HYDROCHLORIDE 1000 MG/1
1000 TABLET ORAL 2 TIMES DAILY WITH MEALS
Qty: 180 TABLET | Refills: 3 | Status: SHIPPED | OUTPATIENT
Start: 2022-03-14

## 2022-03-14 RX ORDER — ROSUVASTATIN CALCIUM 10 MG/1
10 TABLET, COATED ORAL
Qty: 90 TABLET | Refills: 3 | Status: SHIPPED | OUTPATIENT
Start: 2022-03-14

## 2022-03-14 RX ORDER — SEMAGLUTIDE 1.34 MG/ML
1 INJECTION, SOLUTION SUBCUTANEOUS
Qty: 1 BOX | Refills: 11 | Status: SHIPPED | OUTPATIENT
Start: 2022-03-14

## 2022-03-14 NOTE — PROGRESS NOTES
Celso Whitley MD        Patient Information  Name : Crista Dimas 60 y.o.   YOB: 1965         PCP: Socorro Rizo MD         Chief Complaint   Patient presents with    Diabetes       History of Present Illness: Crista Dimas is a 60 y.o. male here for fu of  Type 2 Diabetes Mellitus. Self referred for evaluation and management of type 2 diabetes mellitus. No meter, lately diet is high in carbs, desserts    No hypoglycemia  Taking the medications consistently  Tolerating Ozempic  On antiretrovirals    He was diagnosed with diabetes in 2013,        Oncologist is Dr Sonu August,         Wt Readings from Last 3 Encounters:   03/14/22 184 lb (83.5 kg)   11/10/21 188 lb (85.3 kg)   11/04/21 186 lb 6.4 oz (84.6 kg)       BP Readings from Last 3 Encounters:   03/14/22 110/74   11/10/21 116/75   11/04/21 129/75           Past Medical History:   Diagnosis Date    [facility]     [facility] cancer ([facility]     Type II diabetes mellitus, uncontrolled ([facility]      Current Outpatient Medications   Medication Sig    ammonium lactate (LAC-HYDRIN) 12 % lotion Apply 4 g to affected area two (2) times a day. rub in to affected area well    blpsyudgy-dnuflkrv-ehxecot ala (Biktarvy) tab tablet Take 1 Tab by mouth daily.  terbinafine HCL (LAMISIL) 250 mg tablet Take 250 mg by mouth as needed.  Insulin Needles, Disposable, 32 gauge x 5/32\" ndle Use to inject ozempic weekly Dx Code: E11.65    aspirin (ASPIRIN) 325 mg tablet Take 81 mg by mouth daily.  metFORMIN (GLUCOPHAGE) 1,000 mg tablet Take 1 Tablet by mouth two (2) times daily (with meals).  semaglutide (Ozempic) 1 mg/dose (2 mg/1.5 mL) sub-q pen 1 mg by SubCUTAneous route every seven (7) days.  Stop 0.5 mg    dapagliflozin (Farxiga) 10 mg tab tablet TAKE ONE TABLET BY MOUTH EVERY MORNING (STOP INVOKANA)    rosuvastatin (CRESTOR) 10 mg tablet Take 1 Tablet by mouth nightly. No current facility-administered medications for this visit. Allergies   Allergen Reactions    Azithromycin Myalgia    Sulfa (Sulfonamide Antibiotics) Nausea Only         Review of Systems:  - Per HPI    Physical Examination:   Blood pressure 110/74, pulse (!) 108, temperature 98.5 °F (36.9 °C), temperature source Temporal, resp. rate 16, height 5' 11\" (1.803 m), weight 184 lb (83.5 kg), SpO2 97 %. Estimated body mass index is 25.66 kg/m² as calculated from the following:    Height as of this encounter: 5' 11\" (1.803 m). -   Weight as of this encounter: 184 lb (83.5 kg). - General: pleasant, no distress, good eye contact  - HEENT: no pallor, no periorbital edema, EOMI  - Neck: supple,   - Cardiovascular: regular, normal rate, normal S1 and S2,   - Respiratory: clear to auscultation bilaterally  -   - Neurological: alert and oriented  - Psychiatric: normal mood and affect  - Skin: color, texture, turgor normal.         Data Reviewed:         Cr nl     Assessment/Plan:     1. Type 2 diabetes mellitus with hyperglycemia, without long-term current use of insulin ([address]    2. Mixed hyperlipidemia    3. Essential hypertension        1. Type 2 Diabetes Mellitus   Lab Results   Component Value Date/Time    Hemoglobin A1c 8.7 (H) 03/07/2022 04:09 PM    Hemoglobin A1c (POC) 7.8 11/10/2021 03:49 PM    Hemoglobin A1c, External 8.9 10/25/2019 12:00 AM   Uncontrolled, A1c 7.1 March 2021,   Ozempic  Hydration  Metformin Farxiga   Agreed to change the diet  Discontinued glimepiride      2. HIV - Followed at [facility]     3 HTN -    4 Hyperlipidemia -   Discussed about statin, he is worried about side effects due to antiretrovirals      Prostate cancer -followed by oncologist    Patient Instructions   Crestor     Follow-up and Dispositions    · Return in about 5 months (around 8/14/2022) for labs before next visit and follow up. Thank you for allowing me to participate in the care of this patient.     Norberto Merritt Carrintgon Burnette MD      Patient verbalized understanding

## 2022-03-14 NOTE — LETTER
3/14/2022 3:57 PM    Mr. Adelina Sanchez  [address] [address]      To Whom it may concern,    Mr Olaf Kirk' current Hemoglobin A1C is 8.7% on 03/07/2022. If there are questions or concerns, please contact the office at [phone].         Sincerely,      Javier Garcia MD

## 2022-03-14 NOTE — LETTER
3/20/2022    Patient: Tammi Santamaria   YOB: 1965   Date of Visit: 3/14/2022     Mayra Ortiz MD  [facility] [address]  [address] [address]  Via Fax: [phone]    Dear Mayra Ortiz MD,      Thank you for referring Mr. Mahendra Vidal to [address] [place] [address] for evaluation. My notes for this consultation are attached. If you have questions, please do not hesitate to call me. I look forward to following your patient along with you.       Sincerely,    Lalita Hassan MD

## 2022-03-14 NOTE — PROGRESS NOTES
Khadar Burnett is a 60 y.o. male here for   Chief Complaint   Patient presents with    Diabetes       1. Have you been to the ER, urgent care clinic since your last visit? Hospitalized since your last visit? -no    2. Have you seen or consulted any other health care providers outside of the [address] [place] [address] since your last visit?   Include any pap smears or colon screening.-no

## 2022-03-18 PROBLEM — N41.1 CHRONIC PROSTATITIS: Status: ACTIVE | Noted: 2020-08-21

## 2022-03-18 PROBLEM — N32.0 BLADDER NECK OBSTRUCTION: Status: ACTIVE | Noted: 2020-08-21

## 2022-03-18 PROBLEM — N52.9 IMPOTENCE OF ORGANIC ORIGIN: Status: ACTIVE | Noted: 2020-08-21

## 2022-03-18 PROBLEM — L85.3 XEROSIS CUTIS: Status: ACTIVE | Noted: 2020-08-12

## 2022-03-19 PROBLEM — I10 ESSENTIAL HYPERTENSION: Status: ACTIVE | Noted: 2017-06-23

## 2022-03-19 PROBLEM — E78.5 DYSLIPIDEMIA: Status: ACTIVE | Noted: 2017-10-10

## 2022-03-19 PROBLEM — C61 PRIMARY MALIGNANT NEOPLASM OF PROSTATE (HCC): Status: ACTIVE | Noted: 2020-08-21

## 2022-03-19 PROBLEM — B35.1 ONYCHOMYCOSIS: Status: ACTIVE | Noted: 2020-08-12

## 2022-03-19 PROBLEM — H26.9 BILATERAL CATARACTS: Status: ACTIVE | Noted: 2020-08-21

## 2022-03-19 PROBLEM — R35.1 NOCTURIA: Status: ACTIVE | Noted: 2020-08-21

## 2022-03-19 PROBLEM — S99.192A CLOSED FRACTURE OF BASE OF FIFTH METATARSAL BONE OF LEFT FOOT AT METAPHYSEAL-DIAPHYSEAL JUNCTION: Status: ACTIVE | Noted: 2021-03-01

## 2022-03-20 PROBLEM — E11.42 DIABETIC POLYNEUROPATHY ASSOCIATED WITH TYPE 2 DIABETES MELLITUS (HCC): Status: ACTIVE | Noted: 2021-03-01

## 2022-03-20 PROBLEM — L85.9 HYPERKERATOSIS: Status: ACTIVE | Noted: 2021-04-28

## 2022-03-23 LAB — PSA, EXTERNAL: <0.1

## 2022-04-19 ENCOUNTER — TRANSCRIBE ORDER (OUTPATIENT)
Dept: SCHEDULING | Age: 57
End: 2022-04-19

## 2022-04-19 DIAGNOSIS — Z80.0 FAMILY HISTORY OF MALIGNANT NEOPLASM OF PANCREAS: ICD-10-CM

## 2022-04-19 DIAGNOSIS — R10.2 SUPRAPUBIC PAIN: ICD-10-CM

## 2022-04-19 DIAGNOSIS — Z85.46 HISTORY OF PROSTATE CANCER: Primary | ICD-10-CM

## 2022-05-20 RX ORDER — DULOXETIN HYDROCHLORIDE 60 MG/1
CAPSULE, DELAYED RELEASE ORAL
Qty: 30 CAPSULE | Refills: 0 | Status: SHIPPED | OUTPATIENT
Start: 2022-05-20

## 2022-05-24 ENCOUNTER — OFFICE VISIT (OUTPATIENT)
Dept: UROLOGY | Age: 57
End: 2022-05-24
Payer: COMMERCIAL

## 2022-05-24 VITALS
DIASTOLIC BLOOD PRESSURE: 75 MMHG | HEIGHT: 72 IN | BODY MASS INDEX: 24.52 KG/M2 | OXYGEN SATURATION: 100 % | WEIGHT: 181 LBS | SYSTOLIC BLOOD PRESSURE: 122 MMHG | HEART RATE: 84 BPM | TEMPERATURE: 97.6 F

## 2022-05-24 DIAGNOSIS — B20 HIV INFECTION, UNSPECIFIED SYMPTOM STATUS (HCC): ICD-10-CM

## 2022-05-24 DIAGNOSIS — E11.65 TYPE 2 DIABETES MELLITUS WITH HYPERGLYCEMIA, WITHOUT LONG-TERM CURRENT USE OF INSULIN (HCC): ICD-10-CM

## 2022-05-24 DIAGNOSIS — C61 PRIMARY MALIGNANT NEOPLASM OF PROSTATE (HCC): Primary | ICD-10-CM

## 2022-05-24 DIAGNOSIS — R14.0 BLOATING: ICD-10-CM

## 2022-05-24 PROBLEM — R35.1 NOCTURIA: Status: RESOLVED | Noted: 2020-08-21 | Resolved: 2022-05-24

## 2022-05-24 PROBLEM — Z12.5 PROSTATE CANCER SCREENING: Status: ACTIVE | Noted: 2022-05-24

## 2022-05-24 PROBLEM — N41.1 CHRONIC PROSTATITIS: Status: RESOLVED | Noted: 2020-08-21 | Resolved: 2022-05-24

## 2022-05-24 PROBLEM — Z12.5 PROSTATE CANCER SCREENING: Status: RESOLVED | Noted: 2022-05-24 | Resolved: 2022-05-24

## 2022-05-24 PROBLEM — N32.0 BLADDER NECK OBSTRUCTION: Status: RESOLVED | Noted: 2020-08-21 | Resolved: 2022-05-24

## 2022-05-24 LAB
BILIRUB UR QL: NEGATIVE
GLUCOSE UR-MCNC: 1000 MG/DL
KETONES P FAST UR STRIP-MCNC: NEGATIVE MG/DL
PH UR STRIP: 5 [PH] (ref 4.6–8)
PROT UR QL STRIP: NEGATIVE
SP GR UR STRIP: 1.02 (ref 1–1.03)
UA UROBILINOGEN AMB POC: NORMAL (ref 0.2–1)
URINALYSIS CLARITY POC: CLEAR
URINALYSIS COLOR POC: YELLOW
URINE BLOOD POC: NEGATIVE
URINE LEUKOCYTES POC: NEGATIVE
URINE NITRITES POC: NEGATIVE

## 2022-05-24 PROCEDURE — 99204 OFFICE O/P NEW MOD 45 MIN: CPT | Performed by: UROLOGY

## 2022-05-24 PROCEDURE — 81003 URINALYSIS AUTO W/O SCOPE: CPT | Performed by: UROLOGY

## 2022-05-24 NOTE — PROGRESS NOTES
HISTORY OF PRESENT ILLNESS  Jorydn Oliveira is a 59 y.o. male. Chief Complaint   Patient presents with    New Patient    Annual Exam     ipss     Past Medical History:  PMHx (including negatives):  has a past medical history of HIV (human immunodeficiency virus infection) ([facility] [address] Prostate cancer ([facility] [address] and Type II diabetes mellitus, uncontrolled. PSurgHx:  has a past surgical history that includes hx cataract removal and hx prostatectomy. PSocHx:  reports that he has never smoked. He has never used smokeless tobacco. He reports current alcohol use. He reports that he does not use drugs. Followed at [facility] for prostate cancer. Diagnosed in 2009 with a PSA of 5 at that time. [place] 6 disease, T1c. He had treatment a few months afterward. He is s/p brachytherapy with Dr. Dora Rowell with sporadic Urology follow up per documentation. He saw Dr. Madie Ricketts in 2017 and subsequent biopsy showed no malignancy. PSA continued to rise. He is now on intermittent therapy, Eligard. PSA has been undetectable, last value 3/23/22. He denies voiding difficulty. He has a little discomfort in the prostate area. He can get a burning sensation. He denies gross hematuria. He denies frequency. Nocturia x 1. He has not trouble emptying his bladder. He has a HIV history and syphilis infection. He is under control. He has a h/o diabetes. He intermittently checks his BS. Ref Range & Units 3/7/22 1609   Hemoglobin A1c 4.8 - 5.6 % 8.7 High           Chronic Conditions Addressed Today     1. Primary malignant neoplasm of prostate (HCC)     Overview      Followed at [facility] for prostate cancer. Diagnosed in 2009 with a PSA of 5 at that time. [place] 6 disease, T1c. He is s/p brachytherapy with Dr. Dora Rowell with sporadic Urology follow up per documentation. He saw Dr. Madie Ricketts in 2017 and subsequent biopsy showed no malignancy. PSA continued to rise. He is now on intermittent therapy, Eligard.          2. Prostate cancer screening     Overview      4/27/16: 1.18  8/25/17: 6.66  2017 biopsy; benign  8/27/18: 17.67  7/30/19: undetectable  8/11/2020: undetectable  3/23/22: undetectable                    Review of Systems   Gastrointestinal:        Bloating     Patient denies the symptoms of COVID-19 per routine screening guidelines. Physical Exam    ASSESSMENT and PLAN  Diagnoses and all orders for this visit:    1. Primary malignant neoplasm of prostate [facility])  Assessment & Plan:  History of [place] 6 prostate cancer status post brachytherapy. He had a PSA rise and was treated with ADT. PSA remains undetectable. He receives his Eligard with Dr. Scott Luther at [facility]    Orders:  -     AMB POC URINALYSIS DIP STICK AUTO W/O MICRO    2. HIV infection, unspecified symptom status ([facility] [address]  Assessment & Plan:  He has his care at [facility].  5/4/2022 HIV mRNA was undetectable. He did have antibodies positive for syphilis. 3. Type 2 diabetes mellitus with hyperglycemia, without long-term current use of insulin ([facility])  Assessment & Plan:  Poorly controlled diabetes may affect his risk of urinary infections and prostatitis. 4. Bloating  Comments:  possible radiation proctitis. He sees Dr. Shravan Garcia. Follow-up and Dispositions    · Return if symptoms worsen or fail to improve. Follow-up and Disposition History         Krystal Rodriguez may have a reminder for a \"due or due soon\" health maintenance. The patient has been encouraged to contact their primary care provider for follow-up on this health maintenance or other necessary and/or routine health screening.      Shital Almaraz MD

## 2022-05-24 NOTE — ASSESSMENT & PLAN NOTE
He has his care at [address] [place] [address].  5/4/2022 HIV mRNA was undetectable. He did have antibodies positive for syphilis.

## 2022-05-24 NOTE — PROGRESS NOTES
Chief Complaint   Patient presents with    New Patient    Annual Exam     ipss       PHQ-9 score is    Negative    Vitals:    05/24/22 1434   BP: 122/75   Pulse: 84   Temp: 97.6 °F (36.4 °C)   TempSrc: Temporal   SpO2: 100%   Weight: 181 lb (82.1 kg)   Height: 6' (1.829 m)   PainSc:   4        1. \"Have you been to the ER, urgent care clinic since your last visit? Hospitalized since your last visit? \" No    2. \"Have you seen or consulted any other health care providers outside of the [address] [place] [address] since your last visit? \" No     3. For patients aged 39-70: Has the patient had a colonoscopy / FIT/ Cologuard? No      If the patient is female:    4. For patients aged 41-77: Has the patient had a mammogram within the past 2 years? No      5. For patients aged 21-65: Has the patient had a pap smear?  No

## 2022-05-24 NOTE — LETTER
5/24/2022    Patient: Rashid Salazar   YOB: 1965   Date of Visit: 5/24/2022     Annmarie Mancilla MD  [address]  [address] [address]  Via Fax: [phone]     [place] [address]  [phone]  Via Fax: [phone]    Dear MD Jil Chang MD,      Thank you for referring Mr. Sharonda Mckeon to [place] [address] for evaluation. My notes for this consultation are attached. If you have questions, please do not hesitate to call me. I look forward to following your patient along with you.       Sincerely,    Chaya Ralph MD

## 2022-05-24 NOTE — ASSESSMENT & PLAN NOTE
History of [place] 6 prostate cancer status post brachytherapy. He had a PSA rise and was treated with ADT. PSA remains undetectable.   He receives his Eligard with Dr. Shane Burleson at [facility]

## 2022-06-06 ENCOUNTER — OFFICE VISIT (OUTPATIENT)
Dept: PODIATRY | Age: 57
End: 2022-06-06
Payer: COMMERCIAL

## 2022-06-06 VITALS
DIASTOLIC BLOOD PRESSURE: 67 MMHG | WEIGHT: 181 LBS | BODY MASS INDEX: 24.52 KG/M2 | HEIGHT: 72 IN | HEART RATE: 97 BPM | SYSTOLIC BLOOD PRESSURE: 101 MMHG | TEMPERATURE: 97.8 F

## 2022-06-06 DIAGNOSIS — E11.65 TYPE 2 DIABETES MELLITUS WITH HYPERGLYCEMIA, WITHOUT LONG-TERM CURRENT USE OF INSULIN (HCC): Primary | ICD-10-CM

## 2022-06-06 DIAGNOSIS — L85.9 HYPERKERATOSIS: ICD-10-CM

## 2022-06-06 DIAGNOSIS — E11.42 DIABETIC POLYNEUROPATHY ASSOCIATED WITH TYPE 2 DIABETES MELLITUS (HCC): ICD-10-CM

## 2022-06-06 PROCEDURE — 99213 OFFICE O/P EST LOW 20 MIN: CPT | Performed by: PODIATRIST

## 2022-06-06 PROCEDURE — 3052F HG A1C>EQUAL 8.0%<EQUAL 9.0%: CPT | Performed by: PODIATRIST

## 2022-06-06 NOTE — PROGRESS NOTES
Chief Complaint   Patient presents with    Follow-up    Callouses    Foot Swelling     1. Have you been to the ER, urgent care clinic since your last visit? Hospitalized since your last visit? No    2. Have you seen or consulted any other health care providers outside of the [address] [place] [address] since your last visit? Include any pap smears or colon screening.  No  PCP-Dr Vicky Deshpande

## 2022-07-06 NOTE — PROGRESS NOTES
[facility]    Subjective:         Patient is a 59 y.o. male who is being seen as a returning patient for a diabetic pedal exam. Pt states he has close f/u with his PCP (Dr. Sofia Spear). Denies any recent trauma. Admits to calls formation to the left foot. Denies any breaks in the skin or local/systemic signs of infx. Denies any recent trauma. He denies any other pedal complaints    Past Medical History:   Diagnosis Date    HIV (human immunodeficiency virus infection) ([facility] [address]     Prostate cancer ([facility] [address]     Type II diabetes mellitus, uncontrolled      Past Surgical History:   Procedure Laterality Date    HX CATARACT REMOVAL      HX PROSTATECTOMY         Family History   Problem Relation Age of Onset    Diabetes Mother     Diabetes Father       Social History     Tobacco Use    Smoking status: Never Smoker    Smokeless tobacco: Never Used   Substance Use Topics    Alcohol use: Yes     Alcohol/week: 0.0 standard drinks     Allergies   Allergen Reactions    Azithromycin Myalgia    Sulfa (Sulfonamide Antibiotics) Nausea Only     Prior to Admission medications    Medication Sig Start Date End Date Taking? Authorizing Provider   DULoxetine (CYMBALTA) 60 mg capsule TAKE ONE CAPSULE BY MOUTH DAILY 5/20/22   Henry Philippe DPM   metFORMIN (GLUCOPHAGE) 1,000 mg tablet Take 1 Tablet by mouth two (2) times daily (with meals). 3/14/22   Cristal Ahumada MD   semaglutide (Ozempic) 1 mg/dose (2 mg/1.5 mL) sub-q pen 1 mg by SubCUTAneous route every seven (7) days. Stop 0.5 mg 3/14/22   Cristal Ahumada MD   dapagliflozin Tennis Jose) 10 mg tab tablet TAKE ONE TABLET BY MOUTH EVERY MORNING (STOP INVOKANA) 3/14/22   Cristal Ahumada MD   rosuvastatin (CRESTOR) 10 mg tablet Take 1 Tablet by mouth nightly. 3/14/22   Cristal Ahumada MD   ammonium lactate (LAC-HYDRIN) 12 % lotion Apply 4 g to affected area two (2) times a day.  rub in to affected area well 8/24/20   Sriram Francisco DPM dqarpruzq-oizibstp-qpqwphx ala (Biktarvy) tab tablet Take 1 Tab by mouth daily. Provider, Historical   terbinafine HCL (LAMISIL) 250 mg tablet Take 250 mg by mouth as needed. Provider, Historical   Insulin Needles, Disposable, 32 gauge x 5/32\" ndle Use to inject ozempic weekly Dx Code: E11.65 10/22/19   Noe Dunne MD   aspirin (ASPIRIN) 325 mg tablet Take 81 mg by mouth daily. Provider, Historical       Review of Systems   Constitutional: Negative. HENT: Negative. Eyes: Negative. Respiratory: Negative. Cardiovascular: Negative. Gastrointestinal: Negative. Endocrine: Negative. Genitourinary: Negative. Musculoskeletal: Negative. Skin: Negative. Allergic/Immunologic: Positive for immunocompromised state. Neurological: Positive for numbness. Hematological: Negative. Psychiatric/Behavioral: Negative. All other systems reviewed and are negative. Objective:     Visit Vitals  /67 (BP 1 Location: Right upper arm, BP Patient Position: Sitting, BP Cuff Size: Large adult)   Pulse 97   Temp 97.8 °F (36.6 °C) (Temporal)   Ht 6' (1.829 m)   Wt 181 lb (82.1 kg)   BMI 24.55 kg/m²       Physical Exam  Vitals reviewed. Constitutional:       Appearance: He is normal weight. Cardiovascular:      Pulses:           Dorsalis pedis pulses are 2+ on the right side and 2+ on the left side. Posterior tibial pulses are 2+ on the right side and 2+ on the left side. Pulmonary:      Effort: Pulmonary effort is normal.   Musculoskeletal:      Right lower leg: No edema. Left lower leg: No edema (Very mild to the lateral aspect of the left foot). Right foot: Normal range of motion. No deformity or bunion. Left foot: Normal range of motion. No deformity or bunion. Feet:      Right foot:      Protective Sensation: 10 sites tested. 0 sites sensed.       Skin integrity: Skin integrity normal.      Toenail Condition: Right toenails are normal.      Left foot:      Protective Sensation: 10 sites tested. 0 sites sensed. Skin integrity: Callus present. Toenail Condition: Left toenails are normal.   Lymphadenopathy:      Lower Body: No right inguinal adenopathy. No left inguinal adenopathy. Skin:     General: Skin is warm. Capillary Refill: Capillary refill takes 2 to 3 seconds. Neurological:      Mental Status: He is alert and oriented to person, place, and time. Psychiatric:         Mood and Affect: Mood and affect normal.         Behavior: Behavior is cooperative. Impression:       ICD-10-CM ICD-9-CM    1. Type 2 diabetes mellitus with hyperglycemia, without long-term current use of insulin ([facility])  E11.65 250.00      790.29    2. Diabetic polyneuropathy associated with type 2 diabetes mellitus (HCC)  E11.42 250.60      357.2    3. Hyperkeratosis  L85.9 701.1          Recommendation:     Patient seen and evaluated in the office  Discussed and educated patient regarding their current medical condition at it pertains to his diabetes and his overall pedal condition. Instructed patient to monitor their feet daily for possible wound formation, be compliant with all medications and wear supportive shoe gear for wound prevention. Reviewed and discussed most recent lab work, specifically as it pertains to his diabetes. Pt verbalized understanding of all discussed and reviewed   Instructed patient he needed to limit focal pressure and shear forces to prevent the calluses from returning. [facility], [address] and [place]  [facility]  [address] [place] [address]  O: [phone]  F: [phone]  C: [phone]

## 2022-10-17 ENCOUNTER — DOCUMENTATION ONLY (OUTPATIENT)
Dept: PHARMACY | Age: 57
End: 2022-10-17

## 2022-10-17 NOTE — LETTER
[address]  [address], [place] [address]  Phone: toll free [phone] Option #3        Mr. Garcia Sis  [address] [place] [address]        Congratulations! You have successfully enrolled in the Be Well With Diabetes program for 2022. What you receive  Beginning November 1, you will begin receiving up to $300 in waived copays for specific medications and pharmacy-related supplies purchased through our home delivery pharmacy, [facility]. A list of eligible medications and pharmacy supplies can be found at [employer] under Be Well With Diabetes. In addition, youll receive advice and help from our pharmacists, associate care management team and diabetes educators. (And, if you also participate in the Be Well program, you can earn points and Lifestyle Management or Health Management program credit, if applicable.)    What you need to do  To maintain your benefit this year and remain eligible next year, complete the following requirements:              *Can be satisfied through [facility] Screening on-site. **Requirements to enroll must be completed within 6 months of enrollment date **Pneumonia vaccine is dependent on previous immunization and your age. Remember, program requirements must be completed by deadlines shown. If not, your benefit may be terminated, and you will not be eligible to participate again until the following year. To keep you on track, well review your [employer] account and send reminders for action. (If you dont have a [address], submit documentation to [email or web address] or by fax to [phone].)    Congratulations and thank you for taking steps to improve your health and to Be Well With Diabetes. [facility] [facility]  Phone: [phone] Option #3  Email: [email or web address]  Fax: [phone]

## 2022-10-17 NOTE — PROGRESS NOTES
Pharmacy Pop Care Documentation:   Patient has completed all the requirements by 10/25/22 and therefore will be enrolled in the DM Program on 11/01/22. Application received: via [employer]. Letter mailed to patient.     Africa Lai, [address]   Department, toll free: [phone] Option #3

## 2022-11-01 ENCOUNTER — DOCUMENTATION ONLY (OUTPATIENT)
Dept: PHARMACY | Age: 57
End: 2022-11-01

## 2022-11-01 NOTE — PROGRESS NOTES
The application for Kassy Greenfield for enrollment into the diabetes management program has been reviewed and accepted on 11/01/22.     Samia Keene

## 2022-11-02 LAB — HBA1C MFR BLD HPLC: 7.3 %

## 2022-11-14 ENCOUNTER — OFFICE VISIT (OUTPATIENT)
Dept: PODIATRY | Age: 57
End: 2022-11-14
Payer: COMMERCIAL

## 2022-11-14 VITALS
BODY MASS INDEX: 24.38 KG/M2 | DIASTOLIC BLOOD PRESSURE: 83 MMHG | HEIGHT: 72 IN | HEART RATE: 84 BPM | WEIGHT: 180 LBS | SYSTOLIC BLOOD PRESSURE: 147 MMHG | TEMPERATURE: 97.5 F | RESPIRATION RATE: 18 BRPM | OXYGEN SATURATION: 98 %

## 2022-11-14 DIAGNOSIS — E11.65 TYPE 2 DIABETES MELLITUS WITH HYPERGLYCEMIA, WITHOUT LONG-TERM CURRENT USE OF INSULIN (HCC): ICD-10-CM

## 2022-11-14 DIAGNOSIS — L85.3 XEROSIS CUTIS: ICD-10-CM

## 2022-11-14 DIAGNOSIS — E11.42 DIABETIC POLYNEUROPATHY ASSOCIATED WITH TYPE 2 DIABETES MELLITUS (HCC): ICD-10-CM

## 2022-11-14 DIAGNOSIS — S90.851A FOREIGN BODY IN RIGHT FOOT, INITIAL ENCOUNTER: Primary | ICD-10-CM

## 2022-11-14 PROCEDURE — 99213 OFFICE O/P EST LOW 20 MIN: CPT | Performed by: PODIATRIST

## 2022-11-14 PROCEDURE — 73630 X-RAY EXAM OF FOOT: CPT | Performed by: PODIATRIST

## 2022-11-14 PROCEDURE — 3052F HG A1C>EQUAL 8.0%<EQUAL 9.0%: CPT | Performed by: PODIATRIST

## 2022-11-14 PROCEDURE — 3074F SYST BP LT 130 MM HG: CPT | Performed by: PODIATRIST

## 2022-11-14 PROCEDURE — 3078F DIAST BP <80 MM HG: CPT | Performed by: PODIATRIST

## 2022-11-14 RX ORDER — AMMONIUM LACTATE 12 G/100G
CREAM TOPICAL 2 TIMES DAILY
Qty: 280 G | Refills: 3 | Status: SHIPPED | OUTPATIENT
Start: 2022-11-14

## 2022-11-14 NOTE — PROGRESS NOTES
[facility]    Subjective:         Patient is a 59 y.o. male who is being seen as a new patient for an acute complaint of a possible foreign body to the plantar right heel. Patient states a few weeks prior he was walking in his home and a sock and felt something poke his heel. He states there is no wound. His girlfriend is a podiatrist and she was unable to find a portal of entry or wound. He denies any diagnostic testing to interrogate the area of concern. He admits to mild pain, rated level of 2 out of 10. He states pain is exacerbated with activity. He denies any local/systemic signs of infection. He denies any other lower extremity complaints      Past Medical History:   Diagnosis Date    HIV (human immunodeficiency virus infection) ([facility] [address]     [facility]     Type II diabetes mellitus, uncontrolled      Past Surgical History:   Procedure Laterality Date    HX CATARACT REMOVAL      HX PROSTATECTOMY         Family History   Problem Relation Age of Onset    Diabetes Mother     Diabetes Father       Social History     Tobacco Use    Smoking status: Never    Smokeless tobacco: Never   Substance Use Topics    Alcohol use: Yes     Alcohol/week: 0.0 standard drinks     Allergies   Allergen Reactions    Azithromycin Myalgia    Sulfa (Sulfonamide Antibiotics) Nausea Only     Prior to Admission medications    Medication Sig Start Date End Date Taking? Authorizing Provider   ammonium lactate (AMLACTIN) 12 % topical cream Apply  to affected area two (2) times a day. rub in to affected area well 11/14/22  Yes Phillingane, Deronda Sandhoff, DPM   DULoxetine (CYMBALTA) 60 mg capsule TAKE ONE CAPSULE BY MOUTH DAILY 5/20/22  Yes Phillingane, Deronda Sandhoff, DPM   metFORMIN (GLUCOPHAGE) 1,000 mg tablet Take 1 Tablet by mouth two (2) times daily (with meals). 3/14/22  Yes Debbie Velazco MD   semaglutide (Ozempic) 1 mg/dose (2 mg/1.5 mL) sub-q pen 1 mg by SubCUTAneous route every seven (7) days.  Stop 0.5 mg 3/14/22  Yes Ana Mckay MD   dapagliflozin Virlinda Boast) 10 mg tab tablet TAKE ONE TABLET BY MOUTH EVERY MORNING (STOP INVOKANA) 3/14/22  Yes Ana Mckay MD   rosuvastatin (CRESTOR) 10 mg tablet Take 1 Tablet by mouth nightly. 3/14/22  Yes Ana Mckay MD   kmjfjzzwg-lulfstso-grdzxrn ala (Biktarvy) tab tablet Take 1 Tab by mouth daily. Yes Provider, Historical   terbinafine HCL (LAMISIL) 250 mg tablet Take 250 mg by mouth as needed. Yes Provider, Historical   Insulin Needles, Disposable, 32 gauge x 5/32\" ndle Use to inject ozempic weekly Dx Code: E11.65 10/22/19  Yes Ana Mckay MD   aspirin (ASPIRIN) 325 mg tablet Take 81 mg by mouth daily. Yes Provider, Historical       Review of Systems  Constitutional: Negative. HENT: Negative. Eyes: Negative. Respiratory: Negative. Cardiovascular: Negative. Gastrointestinal: Negative. Endocrine: Negative. Genitourinary: Negative. Musculoskeletal: Negative. Skin: Negative. Allergic/Immunologic: Positive for immunocompromised state. Neurological: Positive for numbness. Hematological: Negative. Psychiatric/Behavioral: Negative. All other systems reviewed and are negative. Objective:     Visit Vitals  BP (!) 147/83 (BP 1 Location: Right upper arm, BP Patient Position: Sitting, BP Cuff Size: Adult)   Pulse 84   Temp 97.5 °F (36.4 °C) (Temporal)   Resp 18   Ht 6' (1.829 m)   Wt 180 lb (81.6 kg)   SpO2 98%   BMI 24.41 kg/m²       Physical Exam  Vitals reviewed. Constitutional:       Appearance: He is normal weight. Cardiovascular:      Pulses:           Dorsalis pedis pulses are 2+ on the right side and 2+ on the left side. Posterior tibial pulses are 2+ on the right side and 2+ on the left side. Pulmonary:      Effort: Pulmonary effort is normal.   Musculoskeletal:      Right lower leg: No edema. Left lower leg: No edema (Very mild to the lateral aspect of the left foot).       Right foot: Normal range of motion. No deformity or bunion. Left foot: Normal range of motion. No deformity or bunion. Feet:      Right foot:      Protective Sensation: 10 sites tested. 0 sites sensed. Skin integrity: Skin integrity normal.      Toenail Condition: Right toenails are normal.      Left foot:      Protective Sensation: 10 sites tested. 0 sites sensed. Skin integrity: Callus present. Toenail Condition: Left toenails are normal.   Lymphadenopathy:      Lower Body: No right inguinal adenopathy. No left inguinal adenopathy. Skin:     General: Skin is warm. Capillary Refill: Capillary refill takes 2 to 3 seconds. Neurological:      Mental Status: He is alert and oriented to person, place, and time. Psychiatric:         Mood and Affect: Mood and affect normal.         Behavior: Behavior is cooperative. Data Review:   EXAM: XR RIGHT FOOT MIN 3 V     INDICATION: Right foot pain     COMPARISON: None     FINDINGS: Three views of the right foot demonstrate no acute fracture or dislocation. Slight HAV deformity noted. There is no other acute osseous or articular abnormality. The soft tissues are within normal limits. Impression:       ICD-10-CM ICD-9-CM    1. Foreign body in right foot, initial encounter  S90.851A 917.6 AMB POC XRAY, FOOT; COMPLETE, 3+ VIEW      2. Type 2 diabetes mellitus with hyperglycemia, without long-term current use of insulin ([facility])  E11.65 250.00      790.29       3. Diabetic polyneuropathy associated with type 2 diabetes mellitus ([facility])  E11.42 250.60      357.2       4. Xerosis cutis  L85.3 706.8             Recommendation:     Patient seen and evaluated in the office  X-rays of the right foot taken today in the office, personally reviewed and findings discussed with patient  Educated patient regarding his/her current medical condition as it pertains to his/her diabetes and his/her overall pedal health.   Instructed patient to monitor his/her feet daily for possible wound formation, be compliant with all medications and wear supportive shoe gear for wound prevention. Reviewed and discussed most recent lab work, specifically as it pertains to his/her diabetes. Prescription was sent for ammonium lactate 12% cream to be applied twice daily to all affected xerotic skin  Pt verbalized understanding of all discussed and reviewed            Eleno Bolton, [facility] [place] [address] Podiatry and Foot Surgery  [address] [facility], [address] [place] [address]  O: [phone]  F: [phone]

## 2022-11-14 NOTE — PROGRESS NOTES
1. \"Have you been to the ER, urgent care clinic since your last visit? Hospitalized since your last visit? \" No    2. \"Have you seen or consulted any other health care providers outside of the [address] [place] [address] since your last visit? \" No     3. For patients aged 39-70: Has the patient had a colonoscopy / FIT/ Cologuard? NA - based on age      If the patient is female:    4. For patients aged 41-77: Has the patient had a mammogram within the past 2 years? NA - based on age or sex      11. For patients aged 21-65: Has the patient had a pap smear?  NA - based on age or sex       Chief Complaint   Patient presents with    Foot Injury     Splinter         Visit Vitals  BP (!) 147/83 (BP 1 Location: Right upper arm, BP Patient Position: Sitting, BP Cuff Size: Adult)   Pulse 84   Temp 97.5 °F (36.4 °C) (Temporal)   Resp 18   Ht 6' (1.829 m)   Wt 180 lb (81.6 kg)   SpO2 98%   BMI 24.41 kg/m²

## 2022-11-22 ENCOUNTER — TELEPHONE (OUTPATIENT)
Dept: PHARMACY | Age: 57
End: 2022-11-22

## 2022-11-22 NOTE — TELEPHONE ENCOUNTER
Called patient to schedule 2022 yearly pharmacist appointment to discuss medications for Diabetes Management Program.    No answer. Left VM on mobile TAD: Please call back at [phone] Option #3.      Dain Tellez, [address] [employer]   Department, toll free: [phone] Option #3

## 2022-11-30 ENCOUNTER — PATIENT MESSAGE (OUTPATIENT)
Dept: PHARMACY | Age: 57
End: 2022-11-30

## 2022-11-30 NOTE — TELEPHONE ENCOUNTER
Second attempt made to contact patient to schedule 2022 yearly pharmacist appointment to discuss medications for Diabetes Management Program.    No answer. Left VM on mobile TAD: Please call back at [phone] Option #3. [employer] message sent to patient. No further patient outreach planned at this time.     Dain Tellez, [address] [employer]   Department, toll free: [phone] Option #3

## 2022-11-30 NOTE — LETTER
Audelia Sumner  [address], [place] [address] 10  Phone: toll free [phone] Option #3         Mr. Mayela Mohr  [address] [address]        Congratulations! You have completed the requirements for the [address] [place] [address] Be Well With Diabetes Program. You have been enrolled into the [address] [place] [address] Be Well With Diabetes Program for November 1, 2022.     One of the requirements to participate in the [address] [place] [address] Be Well With Diabetes Program is to complete a Clinical Pharmacist Telephone appointment yearly.      The [facility] 2 Team has attempted to contact you to schedule your 2022 Diabetes Management telephone appointment but was unable to reach you.      We would like to work with you and your doctor to:  - Review your medications, including over-the-counter and herbal medications  - Answer questions about your medications and how to get the most benefit from them  - Identify potential drug interactions or side effects and help fix them  - Identify preferred medications that are equally effective, but available at a lower cost to you  - Help you reach the necessary requirements to remain enrolled in the Diabetes Management Program offered by [address] [place] [address]     Please call [phone] and select option #3 to schedule this appointment to take advantage of this service. Telephone appointments are available Monday thru Friday from 7:30 AM till 5:30 PM.     This is a courtesy reminder. If you have this appointment already scheduled for your 2022 enrollment in the program, please disregard this message.  If you have not scheduled this appointment yet, please contact us at the above number to schedule.     Sincerely,      MAAME Hyman [facility].  Phone: [phone] Option #3

## 2022-12-06 ENCOUNTER — OFFICE VISIT (OUTPATIENT)
Dept: PODIATRY | Age: 57
End: 2022-12-06
Payer: COMMERCIAL

## 2022-12-06 VITALS
HEART RATE: 98 BPM | TEMPERATURE: 97.1 F | WEIGHT: 180 LBS | DIASTOLIC BLOOD PRESSURE: 79 MMHG | HEIGHT: 72 IN | BODY MASS INDEX: 24.38 KG/M2 | SYSTOLIC BLOOD PRESSURE: 130 MMHG

## 2022-12-06 DIAGNOSIS — E11.65 TYPE 2 DIABETES MELLITUS WITH HYPERGLYCEMIA, WITHOUT LONG-TERM CURRENT USE OF INSULIN (HCC): Primary | ICD-10-CM

## 2022-12-06 DIAGNOSIS — L85.3 XEROSIS CUTIS: ICD-10-CM

## 2022-12-06 DIAGNOSIS — L85.9 HYPERKERATOSIS: ICD-10-CM

## 2022-12-06 DIAGNOSIS — E11.42 DIABETIC POLYNEUROPATHY ASSOCIATED WITH TYPE 2 DIABETES MELLITUS (HCC): ICD-10-CM

## 2022-12-06 PROCEDURE — 99213 OFFICE O/P EST LOW 20 MIN: CPT | Performed by: PODIATRIST

## 2022-12-06 PROCEDURE — 3078F DIAST BP <80 MM HG: CPT | Performed by: PODIATRIST

## 2022-12-06 PROCEDURE — 3052F HG A1C>EQUAL 8.0%<EQUAL 9.0%: CPT | Performed by: PODIATRIST

## 2022-12-06 PROCEDURE — 3074F SYST BP LT 130 MM HG: CPT | Performed by: PODIATRIST

## 2022-12-13 NOTE — PROGRESS NOTES
[facility]    Subjective:         Patient is a 59 y.o. male who is being seen as a returning patient for a diabetic pedal exam.  Patient states he has close follow-up with his PCP Sherryle Congo, MD). States his last office visit with his PCP was 11/02/2022. He describes his diabetes as being under control, his last hemoglobin A1c was 7.3%. He denies any overt pedal pain. He denies any recent trauma. He denies any systemic signs of infection. He states he does have mild callus formation to the plantar aspect of the left foot. He denies any recent changes in his activity level or shoe gear. He denies any other lower extremity complaints        Past Medical History:   Diagnosis Date    HIV (human immunodeficiency virus infection) ([facility] [address]     Prostate cancer ([facility] [address]     Type II diabetes mellitus, uncontrolled      Past Surgical History:   Procedure Laterality Date    HX CATARACT REMOVAL      HX PROSTATECTOMY         Family History   Problem Relation Age of Onset    Diabetes Mother     Diabetes Father       Social History     Tobacco Use    Smoking status: Never    Smokeless tobacco: Never   Substance Use Topics    Alcohol use: Yes     Alcohol/week: 0.0 standard drinks     Allergies   Allergen Reactions    Azithromycin Myalgia    Sulfa (Sulfonamide Antibiotics) Nausea Only     Prior to Admission medications    Medication Sig Start Date End Date Taking? Authorizing Provider   ammonium lactate (AMLACTIN) 12 % topical cream Apply  to affected area two (2) times a day. rub in to affected area well 11/14/22   Sarah Philippe DPM   DULoxetine (CYMBALTA) 60 mg capsule TAKE ONE CAPSULE BY MOUTH DAILY 5/20/22   Sarah Philippe DPM   metFORMIN (GLUCOPHAGE) 1,000 mg tablet Take 1 Tablet by mouth two (2) times daily (with meals). 3/14/22   Nick Robb MD   semaglutide (Ozempic) 1 mg/dose (2 mg/1.5 mL) sub-q pen 1 mg by SubCUTAneous route every seven (7) days.  Stop 0.5 mg 3/14/22 Cheryl Abbott MD   dapagliflozin Michelle Ore) 10 mg tab tablet TAKE ONE TABLET BY MOUTH EVERY MORNING (STOP INVOKANA) 3/14/22   Cheryl Abbott MD   rosuvastatin (CRESTOR) 10 mg tablet Take 1 Tablet by mouth nightly. 3/14/22   Cheryl Abbott MD   jlhvasbny-fhzxbngl-gcrdboa ala (Biktarvy) tab tablet Take 1 Tab by mouth daily. Provider, Historical   Insulin Needles, Disposable, 32 gauge x 5/32\" ndle Use to inject ozempic weekly Dx Code: E11.65 10/22/19   Cheryl Abbott MD   aspirin (ASPIRIN) 325 mg tablet Take 81 mg by mouth daily. Provider, Historical       Review of Systems  Constitutional: Negative. HENT: Negative. Eyes: Negative. Respiratory: Negative. Cardiovascular: Negative. Gastrointestinal: Negative. Endocrine: Negative. Genitourinary: Negative. Musculoskeletal: Negative. Skin: Negative. Allergic/Immunologic: Positive for immunocompromised state. Neurological: Positive for numbness. Hematological: Negative. Psychiatric/Behavioral: Negative. All other systems reviewed and are negative. Objective:     Visit Vitals  /79 (BP 1 Location: Right upper arm, BP Patient Position: Sitting, BP Cuff Size: Large adult)   Pulse 98   Temp 97.1 °F (36.2 °C) (Temporal)   Ht 6' (1.829 m)   Wt 180 lb (81.6 kg)   BMI 24.41 kg/m²       Physical Exam  Vitals reviewed. Constitutional:       Appearance: He is normal weight. Cardiovascular:      Pulses:           Dorsalis pedis pulses are 2+ on the right side and 2+ on the left side. Posterior tibial pulses are 2+ on the right side and 2+ on the left side. Pulmonary:      Effort: Pulmonary effort is normal.   Musculoskeletal:      Right lower leg: No edema. Left lower leg: No edema (Very mild to the lateral aspect of the left foot). Right foot: Normal range of motion. No deformity or bunion. Left foot: Normal range of motion. No deformity or bunion.    Feet:      Right foot: Protective Sensation: 10 sites tested. 0 sites sensed. Skin integrity: Skin integrity normal.      Toenail Condition: Right toenails are normal.      Left foot:      Protective Sensation: 10 sites tested. 0 sites sensed. Skin integrity: Callus present. Toenail Condition: Left toenails are normal.   Lymphadenopathy:      Lower Body: No right inguinal adenopathy. No left inguinal adenopathy. Skin:     General: Skin is warm. Capillary Refill: Capillary refill takes 2 to 3 seconds. Neurological:      Mental Status: He is alert and oriented to person, place, and time. Psychiatric:         Mood and Affect: Mood and affect normal.         Behavior: Behavior is cooperative. Impression:       ICD-10-CM ICD-9-CM    1. Type 2 diabetes mellitus with hyperglycemia, without long-term current use of insulin ([facility])  E11.65 250.00      790.29       2. Diabetic polyneuropathy associated with type 2 diabetes mellitus ([facility])  E11.42 250.60      357.2       3. Hyperkeratosis  L85.9 701.1       4. Xerosis cutis  L85.3 706.8             Recommendation:     Patient seen and evaluated in the office  Discussed and educated patient regarding his/her current medical condition as it pertains to his/her diabetes and his/her overall pedal health. Instructed patient to monitor his/her feet daily for possible wound formation, be compliant with all medications and wear supportive shoe gear for wound prevention. Reviewed and discussed most recent lab work, specifically as it pertains to his/her diabetes. Pt to con with the ammonium lactate 12% cream to be applied twice daily to all affected xerotic skin. Also instructed patient to limit focal pressure and shear forces to prevent the hyperkeratotic lesions from returning  Pt verbalized understanding of all discussed and reviewed            Eleno Philippe DPM, [facility], [address] [place] [address] and Foot Surgery  [address] [facility] [place], [facility] Nicola Jameson, [facility]  O: [phone]  F: [phone]

## 2022-12-15 NOTE — TELEPHONE ENCOUNTER
[employer] message not read by patient. Letter mailed.     For Pharmacy Admin Tracking Only    CPA in place: No  Gap Closed?: No  Time Spent (min): 5

## 2022-12-27 NOTE — TELEPHONE ENCOUNTER
Patient called back and scheduled appoitnment for 12/28/22 at 4:30pm.       Mckenzie Back, [address] [employer] [facility]   Phone: [phone]       For Pharmacy Admin Tracking Only    Program: [address] in place: No  Recommendation Provided To: Patient/Caregiver: 1 via Telephone  Intervention Detail: Scheduled Appointment  Intervention Accepted By: Patient/Caregiver: 1  Gap Closed?: Yes  Time Spent (min): 10

## 2022-12-28 ENCOUNTER — TELEPHONE (OUTPATIENT)
Dept: PHARMACY | Age: 57
End: 2022-12-28

## 2022-12-28 NOTE — TELEPHONE ENCOUNTER
[facility] CLINICAL PHARMACY REVIEW - Be Well with Diabetes    Melissa Dejesus is a 59 y.o. male enrolled in the [address] [place], [place] [address] Employee Diabetes Program. Patient provided Lonell Mule with verbal consent to remain in the program for this year. Patient enrolled 11/1/22. Insurance through the following employer: [address] [place] [address]    Visit was completed on 12/28/22, but will count for 2023 visit    Medications:   Current Outpatient Medications   Medication Sig    ammonium lactate (AMLACTIN) 12 % topical cream Apply  to affected area two (2) times a day. rub in to affected area well    DULoxetine (CYMBALTA) 60 mg capsule TAKE ONE CAPSULE BY MOUTH DAILY    metFORMIN (GLUCOPHAGE) 1,000 mg tablet Take 1 Tablet by mouth two (2) times daily (with meals). semaglutide (Ozempic) 1 mg/dose (2 mg/1.5 mL) sub-q pen 1 mg by SubCUTAneous route every seven (7) days. Stop 0.5 mg    dapagliflozin (Farxiga) 10 mg tab tablet TAKE ONE TABLET BY MOUTH EVERY MORNING (STOP INVOKANA)    rosuvastatin (CRESTOR) 10 mg tablet Take 1 Tablet by mouth nightly. plercxcxq-hscwnyid-mnxwwxe ala (Biktarvy) tab tablet Take 1 Tab by mouth daily. Insulin Needles, Disposable, 32 gauge x 5/32\" ndle Use to inject ozempic weekly Dx Code: E11.65    aspirin (ASPIRIN) 325 mg tablet Take 81 mg by mouth daily. No current facility-administered medications for this visit. Current Pharmacy: [facility]  Current testing supplies/frequency: Not currently testing- Will request prodigy meter and supplies    Allergies:   Allergies   Allergen Reactions    Azithromycin Myalgia    Sulfa (Sulfonamide Antibiotics) Nausea Only      Vitals/Labs:  BP Readings from Last 3 Encounters:   12/06/22 130/79   11/14/22 (!) 147/83   06/06/22 101/67     Lab Results   Component Value Date/Time    Microalb/Creat ratio (ug/mg creat.) 10 11/10/2021 12:00 AM     Lab Results   Component Value Date/Time    Hemoglobin A1c 8.7 (H) 03/07/2022 04:09 PM    Hemoglobin A1c (POC) 7.8 11/10/2021 03:49 PM    Hemoglobin A1c (POC) 7.2 08/21/2020 03:52 PM    Hemoglobin A1c (POC) 8.8 10/21/2019 03:54 PM    Hemoglobin A1c, External 7.3 11/02/2022 12:00 AM    Hemoglobin A1c, External 8.9 10/25/2019 12:00 AM    Hemoglobin A1c, External 10.9 04/25/2019 12:00 AM     Lab Results   Component Value Date/Time    Cholesterol, total 134 11/10/2021 12:00 AM    HDL Cholesterol 37 (L) 11/10/2021 12:00 AM    LDL,Direct 141 (H) 03/07/2022 04:09 PM    LDL-C, External 128 04/25/2019 12:00 AM    LDL, calculated 81 11/10/2021 12:00 AM    VLDL, calculated 16 11/10/2021 12:00 AM    Triglyceride 81 11/10/2021 12:00 AM     ALT (SGPT)   Date Value Ref Range Status   11/10/2021 21 0 - 44 IU/L Final     AST (SGOT)   Date Value Ref Range Status   11/10/2021 16 0 - 40 IU/L Final     The 10-year ASCVD risk score (Bert ARCEO, et al., 2019) is: 14.5%    Values used to calculate the score:      Age: [address] years      Sex: Male      Is Non- : Yes      Diabetic: Yes      Tobacco smoker: No      Systolic Blood Pressure: 645 mmHg      Is BP treated: No      HDL Cholesterol: 37 mg/dL      Total Cholesterol: 134 mg/dL     Lab Results   Component Value Date/Time    Creatinine 1.05 03/07/2022 04:09 PM     CrCl cannot be calculated (Patient's most recent lab result is older than the maximum 180 days allowed. ). Lab Results   Component Value Date/Time    GFR est non-AA 90 11/10/2021 12:00 AM    GFR est  11/10/2021 12:00 AM       Immunizations:  Immunization History   Administered Date(s) Administered    COVID-19, PFIZER Bivalent BOOSTER, (age 12y+), IM, 30 mcg/0.3 mL dose 12/02/2022    COVID-19, PFIZER PURPLE top, DILUTE for use, (age 15 y+), IM, 30mcg/0.3mL 05/04/2022    Zoster Recombinant 05/27/2019, 12/05/2019      Social History:  Social History     Tobacco Use    Smoking status: Never    Smokeless tobacco: Never   Substance Use Topics    Alcohol use:  Yes Alcohol/week: 0.0 standard drinks     ASSESSMENT:  Initial Program Requirements (Y indicates has completed for the year, N indicates needs to be completed by 7/1/23):  NO - Provider Visit for DM (1st)  NO - A1c (1st)    Ongoing Program Requirements (Y indicates has completed for the year, N indicates needs to be completed by 12/31/23):  NO - Provider Visit for DM (2nd)- Next ov is not until March 2023  NO - ACC/diabetes educator visit (if A1c over 8%)- not currently necessary, but aware of the requirement  NO - A1c (2nd)  NO - Lipid panel  NO - Urine microalbumin  YES - Pneumococcal vaccination:  Had both Wdlrotowf93 and Jmsjbzl62. Could consider Noitjkg39 now, but not absolutely necessary  NO - Influenza vaccination for Fall 2023  YES - Medication adherence over 70%  YES - On statin or contraindication(s)  Rosuvastatin - On med list, but not evidence of filling recently  NO - On ACEi/ARB or contraindication(s) Normal blood pressure, urinary albumin-to-creatinine ratio, and eGFR       Current medications eligible for copay waiver, up to $600, through [address]:  - Aspirin, Mariano Labor, Metformin, Rosuvastatin, Ozempic  - Prodigy    Diabetes Care:   - Glycemic Goal: <7.0%. Is not at blood glucose goal but showed significant improvement over the course of 2022 and is close to goal.   - Therapy Optimization: Nothing necessary as of right now. Could possible increase to Ozempic 2mg, but a1c benefit might be minimal. He does not necessarily need more weight loss either  - Could consider combining Farxiga and Metformin. Patient liked the idea, but wanted to wait.  Will not request from endo, but instead send info to him via email  - Cardiovascular disease medication considerations: on glp1a and sglt2  - CKD medication considerations:on glp1a and sglt2  - Medication compliance: compliant all of the time  - Diet compliance: compliant most of the time  - Current exercise: no regular exercise at the moment. Patient says that he had worked out pretty regularly in the past, but got away from it. He is working to find a gym with better hours. He typically exercises later at night. Attempted to find a list of gyms that he could join at a discount with [facility], but was unable to locate. Will encourage that he check with each gym individually    Other Considerations:  - Blood Pressure Goal: BP less than 130/80 mmHg due to history of DM: Is at blood pressure goal.   - Lipids: Patient is prescribed moderate-intensity statin therapy. Patient requested it to be filled by [facility]. Will request new script from Endo      PLAN:  - Consideration(s) for provider:   Statin refill request  - DM program gaps identified:   Initial requirements: Provider visit with provider for DM (1st) and A1c (1st)   Ongoing requirements: Provider visit for DM (2nd), A1c (2nd), Lipid panel, Urine microalbumin, and Influenza vaccination for [id number]   - Education to patient: Discussed general issues about diabetes pathophysiology and management., Overview of Be Well With Diabetes program, Overview of [facility], Benefit/indication for statin in patients with diabetes, Benefit/indication for pneumonia vaccine in patients with diabetes, and Benefit/indication for ACE/ARB in patients with diabetes   - Follow up: Endocrinologist for identified gaps or as scheduled below  - Upcoming appointments:   Future Appointments   Date Time Provider Giselle Zuniga   12/28/2022  4:30 PM [facility] BS AMB   2/14/2023  4:15 PM Eleno Philippe DPM RPP BS AMB   3/21/2023  3:45 PM Deejay Lema MD CDE BS AMB   6/6/2023  3:30 PM Jocelin Jones DPM RPP BS AMB       W.  Enrico Villarreal, PharmD, [address] [facility]  Department, toll free: [phone]    For Pharmacy Admin Tracking Only    Program: [address] in place: No  Recommendation Provided To: Provider: 1 via Note to Provider   Intervention Detail: Refill(s) Provided  Intervention Accepted By: Provider: 1  Gap Closed?: Yes  Time Spent (min): 45        The following was sent in an email to pt per request:    Dear Fito Ro,     You are currently enrolled in the Be Well with Diabetes program. After your recent pharmacist visit, the below were identified as opportunities to assist you with your diabetes management:   I am sorry, I was unable to locate the document about gym memberships deals. This was over 3 years ago so I am not sure if it even exists. I would just encourage you to ask the gym if they have discounts available for [facility]. I do remember there being a lot of [facility] locations on the list.  I am requesting a refill of your rosuvastatin to go to [address] [place] [address] with your other medications  I mentioned the combination of Metformin and Farxiga. It is called [employer]Context and is covered at 0$ for a 90 day supply though mail order. If oyu are interested, discuss with your endocrinologist.    Current medications eligible for copay waiver, up to $600, through [address] [employer]:  - Aspirin, [place], Metformin, Rosuvastatin, Ozempic  - Prodigy    The Pneumonia vaccine recommendations changed in 2022. Please discuss Mssfisj94 with your provider, or contact us with any questions. This vaccine was recently modified to cover 21 of the most common/sever strains of pneumococcal pneumonia. You may need a dose to be considered \"up-to-date\" with the pneumonia vaccine series. Samia Roldan is covered at 100% by your medical and prescription insurance. Home Delivery pharmacy: [phone], option 0 - please allow 2 weeks for processing and shipping prescriptions    To sign up for an online [employer] account, you can visit this website: [employer]. [employer] or search for \"[facility]\" in your mobile phone's lalo store.  You will need one of your prescription numbers, mobile phone number associated with your home delivery, and email address to get started. If you have any questions/issues with making an account, you can email [email or web address] or call [phone] for assistance. Please work with your provider to ensure that the 2023 requirements are completed by the appropriate dates. - Initial Requirements due by 07/01/2023: Provider visit with provider for DM (1st) and A1c (1st)   - Ongoing Requirements due by 12/31/2023: Provider visit for DM (2nd), A1c (2nd), Lipid panel, Urine microalbumin, and Influenza vaccination for [id number]     *Documentation of any requirements completed or reviewed outside of the [address] [place] [address] medical record will need to be faxed or emailed (fax/email info below). If there are outstanding items for the pharmacist to discuss with your provider, the pharmacist will follow-up with you as appropriate. Thank you,  [facility] 2 Team  Telephone: [phone], Option #3  Fax: [phone]  Email: [email or web address]

## 2023-01-04 DIAGNOSIS — E11.65 TYPE 2 DIABETES MELLITUS WITH HYPERGLYCEMIA, WITHOUT LONG-TERM CURRENT USE OF INSULIN (HCC): ICD-10-CM

## 2023-01-04 RX ORDER — ROSUVASTATIN CALCIUM 10 MG/1
10 TABLET, COATED ORAL
Qty: 90 TABLET | Refills: 3 | Status: SHIPPED | OUTPATIENT
Start: 2023-01-04

## 2023-01-04 NOTE — TELEPHONE ENCOUNTER
Dr. Jw Rosas,    Your patient is currently enrolled in the Be Well with Diabetes program. After your patient's recent visit with a [facility] Clinical Pharmacist, the below were identified as opportunities to assist with their diabetes management (if patient is not eligible for below recommendations, please reply with reason/contraindication):   He requested a refill of Crestor be sent to his mail order so he can get for 0$. I pended an order for your convenience    See pharmacist note dated 12/28/22 for complete details. Thank you,  ADA Blakely, PharmD, [address] [facility], toll free: [phone]

## 2023-02-04 ENCOUNTER — TRANSCRIBE ORDER (OUTPATIENT)
Dept: SCHEDULING | Age: 58
End: 2023-02-04

## 2023-02-04 DIAGNOSIS — M79.89 BILATERAL SWELLING OF FEET: ICD-10-CM

## 2023-02-04 DIAGNOSIS — S92.355S CLOSED NONDISPLACED FRACTURE OF FIFTH METATARSAL BONE OF LEFT FOOT, SEQUELA: Primary | ICD-10-CM

## 2023-02-04 DIAGNOSIS — M79.652 LEFT THIGH PAIN: ICD-10-CM

## 2023-02-06 DIAGNOSIS — E11.65 TYPE 2 DIABETES MELLITUS WITH HYPERGLYCEMIA, WITHOUT LONG-TERM CURRENT USE OF INSULIN (HCC): ICD-10-CM

## 2023-02-06 RX ORDER — SEMAGLUTIDE 1.34 MG/ML
INJECTION, SOLUTION SUBCUTANEOUS
Qty: 3 EACH | Refills: 11 | Status: SHIPPED | OUTPATIENT
Start: 2023-02-06

## 2023-02-07 LAB
CREATININE, EXTERNAL: 0.99
PSA, EXTERNAL: <0.1

## 2023-02-10 ENCOUNTER — TRANSCRIBE ORDER (OUTPATIENT)
Dept: REGISTRATION | Age: 58
End: 2023-02-10

## 2023-02-10 ENCOUNTER — HOSPITAL ENCOUNTER (OUTPATIENT)
Dept: NON INVASIVE DIAGNOSTICS | Age: 58
End: 2023-02-10
Payer: COMMERCIAL

## 2023-02-10 DIAGNOSIS — M79.652 LEFT THIGH PAIN: Primary | ICD-10-CM

## 2023-02-10 DIAGNOSIS — M79.89 BILATERAL SWELLING OF FEET: ICD-10-CM

## 2023-02-10 DIAGNOSIS — M79.652 LEFT THIGH PAIN: ICD-10-CM

## 2023-02-10 DIAGNOSIS — S92.355S CLOSED NONDISPLACED FRACTURE OF FIFTH METATARSAL BONE OF LEFT FOOT, SEQUELA: ICD-10-CM

## 2023-02-10 PROCEDURE — 93971 EXTREMITY STUDY: CPT

## 2023-02-11 ENCOUNTER — DOCUMENTATION ONLY (OUTPATIENT)
Dept: SURGERY | Age: 58
End: 2023-02-11

## 2023-02-14 ENCOUNTER — OFFICE VISIT (OUTPATIENT)
Dept: PODIATRY | Age: 58
End: 2023-02-14
Payer: COMMERCIAL

## 2023-02-14 VITALS
HEIGHT: 72 IN | WEIGHT: 186 LBS | TEMPERATURE: 98.6 F | DIASTOLIC BLOOD PRESSURE: 78 MMHG | SYSTOLIC BLOOD PRESSURE: 126 MMHG | BODY MASS INDEX: 25.19 KG/M2 | HEART RATE: 98 BPM

## 2023-02-14 DIAGNOSIS — E11.42 DIABETIC POLYNEUROPATHY ASSOCIATED WITH TYPE 2 DIABETES MELLITUS (HCC): ICD-10-CM

## 2023-02-14 DIAGNOSIS — E11.65 TYPE 2 DIABETES MELLITUS WITH HYPERGLYCEMIA, WITHOUT LONG-TERM CURRENT USE OF INSULIN (HCC): ICD-10-CM

## 2023-02-14 DIAGNOSIS — E11.65 TYPE 2 DIABETES MELLITUS WITH HYPERGLYCEMIA, WITHOUT LONG-TERM CURRENT USE OF INSULIN (HCC): Primary | ICD-10-CM

## 2023-02-14 DIAGNOSIS — R60.0 BILATERAL LOWER EXTREMITY EDEMA: ICD-10-CM

## 2023-02-14 PROCEDURE — 3078F DIAST BP <80 MM HG: CPT | Performed by: PODIATRIST

## 2023-02-14 PROCEDURE — 3074F SYST BP LT 130 MM HG: CPT | Performed by: PODIATRIST

## 2023-02-14 PROCEDURE — 99213 OFFICE O/P EST LOW 20 MIN: CPT | Performed by: PODIATRIST

## 2023-02-14 RX ORDER — METFORMIN HYDROCHLORIDE 1000 MG/1
TABLET ORAL
Qty: 180 TABLET | Refills: 3 | Status: SHIPPED | OUTPATIENT
Start: 2023-02-14

## 2023-02-14 NOTE — PROGRESS NOTES
1. Have you been to the ER, urgent care clinic since your last visit? Hospitalized since your last visit? No    2. Have you seen or consulted any other health care providers outside of the [address] [place] [address] since your last visit? Include any pap smears or colon screening.  No    Chief Complaint   Patient presents with    Foot Swelling     Bilateral feet swelling

## 2023-03-07 ENCOUNTER — TELEPHONE (OUTPATIENT)
Dept: ENDOCRINOLOGY | Age: 58
End: 2023-03-07

## 2023-03-07 DIAGNOSIS — E11.65 TYPE 2 DIABETES MELLITUS WITH HYPERGLYCEMIA, WITHOUT LONG-TERM CURRENT USE OF INSULIN (HCC): ICD-10-CM

## 2023-03-07 NOTE — TELEPHONE ENCOUNTER
Patient can only be seen after 130pm on any day. Let me know. If you want to work late on Thursday the 30th. Thank you      DISREGARD GOT HIM IN ON A [employer].  FOR 3/10/2023

## 2023-03-08 ENCOUNTER — TELEPHONE (OUTPATIENT)
Dept: PHARMACY | Age: 58
End: 2023-03-08

## 2023-03-08 NOTE — TELEPHONE ENCOUNTER
**Patient is [facility]**     2023 Annual Pharmacist Visit    Called patient to schedule 2023 yearly pharmacist appointment to discuss medications for Diabetes Management Program.    No answer. Left VM.      Please call back at [phone], option #3         Jeanine Dangelo, [address]   Phone: [phone], option #3

## 2023-03-10 ENCOUNTER — OFFICE VISIT (OUTPATIENT)
Dept: ENDOCRINOLOGY | Age: 58
End: 2023-03-10

## 2023-03-10 VITALS
RESPIRATION RATE: 18 BRPM | WEIGHT: 184 LBS | OXYGEN SATURATION: 97 % | DIASTOLIC BLOOD PRESSURE: 77 MMHG | HEART RATE: 88 BPM | BODY MASS INDEX: 24.92 KG/M2 | SYSTOLIC BLOOD PRESSURE: 119 MMHG | TEMPERATURE: 98.8 F | HEIGHT: 72 IN

## 2023-03-10 DIAGNOSIS — I10 ESSENTIAL HYPERTENSION: ICD-10-CM

## 2023-03-10 DIAGNOSIS — E78.2 MIXED HYPERLIPIDEMIA: ICD-10-CM

## 2023-03-10 DIAGNOSIS — E11.65 TYPE 2 DIABETES MELLITUS WITH HYPERGLYCEMIA, WITHOUT LONG-TERM CURRENT USE OF INSULIN (HCC): Primary | ICD-10-CM

## 2023-03-10 RX ORDER — BLOOD-GLUCOSE SENSOR
EACH MISCELLANEOUS
Qty: 6 EACH | Refills: 3 | Status: SHIPPED | OUTPATIENT
Start: 2023-03-10

## 2023-03-10 NOTE — PROGRESS NOTES
Kalli Siddiqui is a 59 y.o. male here for   Chief Complaint   Patient presents with    Diabetes       1. Have you been to the ER, urgent care clinic since your last visit? Hospitalized since your last visit? -no    2. Have you seen or consulted any other health care providers outside of the [address] [place] [address] since your last visit? Include any pap smears or colon screening. -PCP

## 2023-03-10 NOTE — PROGRESS NOTES
Mina Colbert MD        Patient Information  Name : Josue Bragg 59 y.o.   YOB: 1965         PCP: Shemar Villafana MD         Chief Complaint   Patient presents with    Diabetes       History of Present Illness: Josue Bragg is a 59 y.o. male here for fu of  Type 2 Diabetes Mellitus. 3/10/23  No hypoglycemia  No dehydration  Seen podiatry -for onychomycosis,  Taking medications consistently,    On antiretrovirals    He was diagnosed with diabetes in 2013,    Oncologist is Dr Alexandru Gates,     Wt Readings from Last 3 Encounters:   03/10/23 184 lb (83.5 kg)   02/14/23 186 lb (84.4 kg)   12/06/22 180 lb (81.6 kg)       BP Readings from Last 3 Encounters:   03/10/23 119/77   02/14/23 126/78   12/06/22 130/79           Past Medical History:   Diagnosis Date    HIV (human immunodeficiency virus infection) ([facility] [address]     Prostate cancer ([facility] [address] [facility]     Type II diabetes mellitus, uncontrolled      Current Outpatient Medications   Medication Sig    dapagliflozin-metformin (Xigduo XR) 5-1,000 mg TBph Stop Farxiga and Metformin    Blood-Glucose Sensor (FreeStyle Anuel 3 Sensor) stephany Use as directed every 14 days. Dx code E11.65    Ozempic 1 mg/dose (4 mg/3 mL) pnij INJECT 1MG UNDER THE SKIN ONCE WEEKLY    rosuvastatin (CRESTOR) 10 mg tablet Take 1 Tablet by mouth nightly. ammonium lactate (AMLACTIN) 12 % topical cream Apply  to affected area two (2) times a day. rub in to affected area well    kkfpallqw-dkkctrik-wzvaait ala (Biktarvy) tab tablet Take 1 Tab by mouth daily. Insulin Needles, Disposable, 32 gauge x 5/32\" ndle Use to inject ozempic weekly Dx Code: E11.65    aspirin (ASPIRIN) 325 mg tablet Take 81 mg by mouth daily. DULoxetine (CYMBALTA) 60 mg capsule TAKE ONE CAPSULE BY MOUTH DAILY (Patient not taking: Reported on 3/10/2023)     No current facility-administered medications for this visit.      Allergies   Allergen Reactions    Azithromycin Myalgia Sulfa (Sulfonamide Antibiotics) Nausea Only         Review of Systems:  Per HPI    Physical Examination:   Blood pressure 119/77, pulse 88, temperature 98.8 °F (37.1 °C), temperature source Temporal, resp. rate 18, height 6' (1.829 m), weight 184 lb (83.5 kg), SpO2 97 %. Estimated body mass index is 24.95 kg/m² as calculated from the following:    Height as of this encounter: 6' (1.829 m). Weight as of this encounter: 184 lb (83.5 kg). General: pleasant, no distress, good eye contact  HEENT: no pallor, no periorbital edema, EOMI  Neck: supple,   Cardiovascular: regular, normal rate, normal S1 and S2,   Respiratory: clear to auscultation bilaterally    Neurological: alert and oriented  Psychiatric: normal mood and affect  Skin: color, texture, turgor normal.     Diabetic foot exam ; March 2023    H/o partial or complete amputation of foot : No  H/o previous foot ulceration : No  H/o pre - ulcerative callus : Yes  H/o peripheral neuropathy and callus : No  H/o poor circulation  : No  Foot deformity :Callus    Data Reviewed:       Assessment/Plan:     1. Type 2 diabetes mellitus with hyperglycemia, without long-term current use of insulin ([facility]    2. Mixed hyperlipidemia    3. Essential hypertension          1. Type 2 Diabetes Mellitus with peripheral neuropathy  Lab Results   Component Value Date/Time    Hemoglobin A1c 8.7 (H) 03/07/2022 04:09 PM    Hemoglobin A1c (POC) 7.8 11/10/2021 03:49 PM    Hemoglobin A1c, External 7.3 11/02/2022 12:00 AM     Ozempic, Xigduo,Hydration  Labs today  Agreed to change the diet  Discontinued glimepiride      2. HIV - Followed at [facility]     3 HTN -    4 Hyperlipidemia -   Will benefit from statins      Prostate cancer -followed by oncologist    There are no Patient Instructions on file for this visit. Follow-up and Dispositions    Return in about 4 months (around 7/10/2023) for fasting labs before next visit and follow up.            Thank you for allowing me to participate in the care of this patient.     Lucaino Marie MD      Patient verbalized understanding

## 2023-03-10 NOTE — LETTER
3/11/2023    Patient: Jesi Dumont   YOB: 1965   Date of Visit: 3/10/2023     Gwenith Phoenix, MD  [address]  [address] [address]  Via Fax: [phone]    Dear Gwenith Phoenix, MD,      Thank you for referring Mr. Zac Tracey to [address] [place] [address] for evaluation. My notes for this consultation are attached. If you have questions, please do not hesitate to call me. I look forward to following your patient along with you.       Sincerely,    Eduin Bermeo MD

## 2023-03-11 RX ORDER — DAPAGLIFLOZIN AND METFORMIN HYDROCHLORIDE 5; 1000 MG/1; MG/1
TABLET, FILM COATED, EXTENDED RELEASE ORAL
Qty: 180 TABLET | Refills: 3 | Status: SHIPPED | OUTPATIENT
Start: 2023-03-11

## 2023-03-13 ENCOUNTER — TELEPHONE (OUTPATIENT)
Dept: ENDOCRINOLOGY | Age: 58
End: 2023-03-13

## 2023-03-13 DIAGNOSIS — E11.65 TYPE 2 DIABETES MELLITUS WITH HYPERGLYCEMIA, WITHOUT LONG-TERM CURRENT USE OF INSULIN (HCC): ICD-10-CM

## 2023-03-13 RX ORDER — DAPAGLIFLOZIN AND METFORMIN HYDROCHLORIDE 5; 1000 MG/1; MG/1
1 TABLET, FILM COATED, EXTENDED RELEASE ORAL 2 TIMES DAILY
Qty: 180 TABLET | Refills: 3 | Status: SHIPPED | OUTPATIENT
Start: 2023-03-13

## 2023-03-13 NOTE — PROGRESS NOTES
[facility]    Subjective:         Patient is a 59 y.o. male who is being seen as a returning patient for a diabetic pedal exam.  Patient states he has close follow-up with his PCP Dori Leonard MD). States his last office visit with his PCP was 11/02/2022. He describes his diabetes as being under control, his last hemoglobin A1c was 7.3%. He denies any overt pedal pain but does admit to increased lower extremity swelling. He denies any recent trauma. He denies any systemic signs of infection. He denies any recent changes in his activity level or shoe gear. He denies any other lower extremity complaints      Past Medical History:   Diagnosis Date    HIV (human immunodeficiency virus infection) ([facility] [address]     Prostate cancer ([facility] [address]     Type II diabetes mellitus, uncontrolled      Past Surgical History:   Procedure Laterality Date    HX CATARACT REMOVAL      HX PROSTATECTOMY         Family History   Problem Relation Age of Onset    Diabetes Mother     Diabetes Father       Social History     Tobacco Use    Smoking status: Never    Smokeless tobacco: Never   Substance Use Topics    Alcohol use: Yes     Alcohol/week: 0.0 standard drinks     Allergies   Allergen Reactions    Azithromycin Myalgia    Sulfa (Sulfonamide Antibiotics) Nausea Only     Prior to Admission medications    Medication Sig Start Date End Date Taking? Authorizing Provider   dapagliflozin-metformin (Xigduo XR) 5-1,000 mg TBph Stop [address] and Metformin 3/11/23   Saran Castañeda MD   Blood-Glucose Sensor (FreeStyle Anuel 3 Sensor) stephany Use as directed every 14 days. Dx code E11.65 3/10/23   Saran Castañeda MD   Ozempic 1 mg/dose (4 mg/3 mL) pnij INJECT 1MG UNDER THE SKIN ONCE WEEKLY 2/6/23   Saran Castañeda MD   rosuvastatin (CRESTOR) 10 mg tablet Take 1 Tablet by mouth nightly. 1/4/23   Saran Castañeda MD   ammonium lactate (AMLACTIN) 12 % topical cream Apply  to affected area two (2) times a day.  rub in to affected area well 11/14/22   Renetta Philippe DPM   DULoxetine (CYMBALTA) 60 mg capsule TAKE ONE CAPSULE BY MOUTH DAILY  Patient not taking: Reported on 3/10/2023 5/20/22   Renetta Philippe DPM   umjfocgpz-nttxywnb-ormaxpf ala (Biktarvy) tab tablet Take 1 Tab by mouth daily. Provider, Historical   Insulin Needles, Disposable, 32 gauge x 5/32\" ndle Use to inject ozempic weekly Dx Code: E11.65 10/22/19   Clifford Mercado MD   aspirin (ASPIRIN) 325 mg tablet Take 81 mg by mouth daily. Provider, Historical       Review of Systems  Constitutional: Negative. HENT: Negative. Eyes: Negative. Respiratory: Negative. Cardiovascular: Negative. Gastrointestinal: Negative. Endocrine: Negative. Genitourinary: Negative. Musculoskeletal: Negative. Skin: Negative. Allergic/Immunologic: Positive for immunocompromised state. Neurological: Positive for numbness. Hematological: Negative. Psychiatric/Behavioral: Negative. All other systems reviewed and are negative. Objective:     Visit Vitals  /78 (BP 1 Location: Left upper arm, BP Patient Position: Sitting, BP Cuff Size: Adult)   Pulse 98   Temp 98.6 °F (37 °C) (Temporal)   Ht 6' (1.829 m)   Wt 186 lb (84.4 kg)   BMI 25.23 kg/m²       Physical Exam  Vitals reviewed. Constitutional:       Appearance: He is normal weight. Cardiovascular:      Pulses:           Dorsalis pedis pulses are 2+ on the right side and 2+ on the left side. Posterior tibial pulses are 2+ on the right side and 2+ on the left side. Pulmonary:      Effort: Pulmonary effort is normal.   Musculoskeletal:      Right lower leg: Edema      Left lower leg: Edema     Right foot: Normal range of motion. No deformity or bunion. Left foot: Normal range of motion. No deformity or bunion. Feet:      Right foot:      Protective Sensation: 10 sites tested. 0 sites sensed.       Skin integrity: Skin integrity normal.      Toenail Condition: Right toenails are normal.      Left foot:      Protective Sensation: 10 sites tested. 0 sites sensed. Skin integrity: Callus present. Toenail Condition: Left toenails are normal.   Lymphadenopathy:      Lower Body: No right inguinal adenopathy. No left inguinal adenopathy. Skin:     General: Skin is warm. Capillary Refill: Capillary refill takes 2 to 3 seconds. Neurological:      Mental Status: He is alert and oriented to person, place, and time. Psychiatric:         Mood and Affect: Mood and affect normal.         Behavior: Behavior is cooperative. Impression:       ICD-10-CM ICD-9-CM    1. Type 2 diabetes mellitus with hyperglycemia, without long-term current use of insulin ([facility])  E11.65 250.00      790.29       2. Diabetic polyneuropathy associated with type 2 diabetes mellitus ([facility])  E11.42 250.60      357.2       3. Bilateral lower extremity edema  R60.0 782. 3             Recommendation:     Patient seen and evaluated in the office  Discussed and educated patient regarding his/her current medical condition as it pertains to his/her diabetes and his/her overall pedal health. Instructed patient to monitor his/her feet daily for possible wound formation, be compliant with all medications and wear supportive shoe gear for wound prevention. Reviewed and discussed most recent lab work, specifically as it pertains to his/her diabetes. Instructed pt to utilize lower extremity compression socks for symptomatic relief of the edema  Pt verbalized understanding of all discussed and reviewed          Eleno Peñaloza, [address] [address] Podiatry and Foot Surgery  [address], [address] [place] [address]  O: [phone]  F: [phone]

## 2023-03-13 NOTE — TELEPHONE ENCOUNTER
[id number] Jos[address]  left  stating Xigduo rx does not have specific directions once a day, twice a day etc. Need directions.

## 2023-03-15 NOTE — TELEPHONE ENCOUNTER
Second attempt made to contact patient to schedule 2023 yearly pharmacist appointment to discuss medications for Diabetes Management Program.    No answer. Left VM. Please call back at [phone], option #3. BULX message sent.         Andria Burger, [address]   Phone: [phone], option #3       For Pharmacy Admin Tracking Only    Program: [address] in place: No  Gap Closed?: No  Time Spent (min): 10

## 2023-03-23 NOTE — PROGRESS NOTES
[address] [facility] at [address]  W: [phone]  F: [phone]    Reason for Visit:   Chantelle Samuel is a 59 y.o. male who is seen in consultation at the request of Dr. Samantha Smart for evaluation of prostate cancer. He is transferring care from [address] [place] [address]. History of Present Illness:   Chantelle Samuel is a pleasant 59 y.o. male who presents today for evaluation of prostate cancer. 2009: diagnosed with low-risk prostate cancer. PSA 5. Prostate biopsy with prostate adenocarcinoma with Siva 3+3 = 6, [id number]. Treated with brachytherapy. 2014 PSA 0.52  1/2017 PSA increased to 3.59. Prostate biopsy was repeated and was negative for malignancy  8/2018 PSA 17.  Started on intermittent GnRH injection for biochemical recurrence. Lupron started 11/2018  10/18/18 MRI: no evidence of local recurrence  10/11/2018: small focus in R upper posterior femoral diaphysis, which is probably a metastasis. Suggest radiograph of femur. 10/22/18 CT femur: not felt to be a metastasis  10/11/2018 CT CAP: no evidence of metastatic disease  3/2/20 PSA <0.1  5/16/21 PSA <0.1  3/22/22 PSA <0.1, testosterone 428  Switched to Eligard. C3 Eligard 3/25/22    He believes that his last Eligard was ~1 year ago. He notes that he had some hot flashes when he initially started ADT. These have imporved. He continues to have some mood swings and gynecomastia. He is able to control his mood swings with breathing exercises. He denies any bone pain, abdominal pain, weight changes. Family History: Mother - kidney cancer  1 brother - no history of prostate cancer  Multiple male cousins with prostate cancer     Social History:  Never smoker. 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  Lives in [place]. He is a .      Review of systems was obtained and pertinent findings reviewed above.  Past medical history, social history, family history, medications, and allergies are located in the electronic medical record. Physical Exam:   Visit Vitals  BP (!) 142/84 (BP 1 Location: Right upper arm, BP Patient Position: Sitting)   Pulse 91   Temp 98 °F (36.7 °C)   Resp 17   Ht 6' (1.829 m)   Wt 183 lb (83 kg)   SpO2 96%   BMI 24.82 kg/m²     ECOG PS: 0  General: no distress  Eyes: anicteric sclerae  HENT: oropharynx clear  Neck: supple  Lymphatic: no cervical, supraclavicular adenopathy  Respiratory: normal respiratory effort, CTAB  CV: RRR, no peripheral edema  Ext: warm, well perfused  GI: soft, nontender, nondistended, no masses  Back: no spinal tenderness  Skin: no rashes, no ecchymoses, no petechiae    Results:   No results found for: WBC, HGB, HCT, PLT, MCV, ANEU, HGBPOC, HCTPOC, HGBEXT, HCTEXT, PLTEXT, HGBEXT, HCTEXT, PLTEXT  Lab Results   Component Value Date/Time    Sodium 140 03/13/2023 07:26 AM    Potassium 4.5 03/13/2023 07:26 AM    Chloride 99 03/13/2023 07:26 AM    CO2 24 03/13/2023 07:26 AM    Glucose 173 (H) 03/13/2023 07:26 AM    BUN 14 03/13/2023 07:26 AM    Creatinine 1.03 03/13/2023 07:26 AM    GFR est  11/10/2021 12:00 AM    GFR est non-AA 90 11/10/2021 12:00 AM    Calcium 10.6 (H) 03/13/2023 07:26 AM    Glucose  10/21/2019 03:54 PM     Lab Results   Component Value Date/Time    Bilirubin, total 0.3 11/10/2021 12:00 AM    ALT (SGPT) 21 11/10/2021 12:00 AM    Alk. phosphatase 87 11/10/2021 12:00 AM    Protein, total 7.3 11/10/2021 12:00 AM    Albumin 4.7 11/10/2021 12:00 AM     Records from [facility] reviewed and summarized above. Test results above have been reviewed. Assessment:     Mr. Amelia Diego is 60 yo man with a history of low-risk cT1cN0 prostate cancer ([place] 3+3=6, PSA 5, diagnosed 2009) s/p brachytherapy who developed biochemical recurrence in 2018 (PSA 17) and was started on intermittent GnRH (Lupron 11/2018 x 5 cycles, then Eligard). He is transferring care from [facility].        #) Low-risk prostate cancer, Siva 3+3=6, cT1cN0, s/p brachytherapy, with non-metastatic biochemical recurrence 2018, currently on intermittent GnRH agonist  - Started on intermittent ADT 11/2018 for biochemical recurrence. Intermittent ADT selected to reduce severity of side effects, especially metabolic syndrome in setting of diabetes as well as sexual side effects  - PSA undetectable and testosterone rising 3/22/22. Last injection 3/22/22. - Obtain repeat PSA and testosterone now  - Tentatively plan to resume Eligard.   If PSA remains undetectable, then may consider treatment-free interval.     - Return to clinic in 6 months with repeat labs prior to visit  - Will consider bone density scan at next visit     #) Diabetes Mellitus: management per PCP    #) HIV: on ART    Signed By: Jenny Felton MD

## 2023-03-24 ENCOUNTER — OFFICE VISIT (OUTPATIENT)
Dept: ONCOLOGY | Age: 58
End: 2023-03-24

## 2023-03-24 ENCOUNTER — DOCUMENTATION ONLY (OUTPATIENT)
Dept: ONCOLOGY | Age: 58
End: 2023-03-24

## 2023-03-24 VITALS
HEIGHT: 72 IN | WEIGHT: 183 LBS | RESPIRATION RATE: 17 BRPM | BODY MASS INDEX: 24.79 KG/M2 | SYSTOLIC BLOOD PRESSURE: 142 MMHG | DIASTOLIC BLOOD PRESSURE: 84 MMHG | TEMPERATURE: 98 F | HEART RATE: 91 BPM | OXYGEN SATURATION: 96 %

## 2023-03-24 DIAGNOSIS — C61 PRIMARY MALIGNANT NEOPLASM OF PROSTATE (HCC): Primary | ICD-10-CM

## 2023-03-24 NOTE — LETTER
3/24/2023    Patient: Nola Antony   YOB: 1965   Date of Visit: 3/24/2023     Lily Trammell MD  [address]  [address] [address]  Via Fax: [phone]    Dear Lily Trammell MD,      Thank you for referring Mr. Dari Coello to [address] [place] [address] for evaluation. My notes for this consultation are attached. If you have questions, please do not hesitate to call me. I look forward to following your patient along with you.       Sincerely,    Sara South MD

## 2023-03-24 NOTE — PROGRESS NOTES
Diogenes Corona is a 59 y.o. male    evaluation of prostate cancer. 1. Have you been to the ER, urgent care clinic since your last visit? Hospitalized since your last visit? No    2. Have you seen or consulted any other health care providers outside of the [address] [place] [address] since your last visit? Include any pap smears or colon screening.  No

## 2023-03-24 NOTE — PROGRESS NOTES
Oncology Social Work  Psychosocial Assessment    Reason for Assessment:      [] Social Work Referral [x] Initial Assessment [] New Diagnosis [] Other    Advance Care Planning:  No flowsheet data found. Sources of Information:    [x]Patient  []Family  []Staff  []Medical Record    Mental Status:    [x]Alert  []Lethargic  []Unresponsive   [] Unable to assess   Oriented to:  [x]Person  []Place  []Time  [x]Situation      Relationship Status:  []Single  []  [x]Significant Other/Life Partner  []  []  []    Living Circumstances:  [x]Lives Alone  []Family/Significant Other in Household  []Roommates  []Children in the Home  []Paid Caregivers  []Assisted Living Facility/Group Home  []Skilled [address]  []Homeless  []Incarcerated  []Environmental/Care Concerns  []Other:    Employment Status:  [x]Employed Full-time []Employed Part-time []Homemaker  [] Retired [] Short-Term Disability [] [facility]  [] Unemployed   [] SSI   [] SSDI  [] Self-Employment    Barriers to Learning:    []Language  []Developmental  []Cognitive  []Altered Mental Status  []Visual/Hearing Impairment  []Unable to Read/Write  []Motivational  [] Challenges Understanding Medical Jargon [x]No Barriers Identified      Financial/Legal Concerns:    []Uninsured  []Limited Income/Resources  []Non-Citizen  []Food Insecurity []No Concerns Identified   [x]Other: Covering cost of treatment meds    Restorationism/Spiritual/Existential:    Did not discuss at this visit   Does patient have any spiritual or Presybeterian beliefs? [] Yes [] No  Is the patient involved in a spiritual, balwinder or Presybeterian community?  [] Yes [] No  Patient expressing spiritual/existential angst? [] Yes [] No  Notes:     Support System:    Identified Support Person/Group:  [x]Strong  []Fair  []Limited    Coping with Illness:   [x]  Coping Well  [] Challenges Coping with Serious Illness [] Situational Depression [] Situational Anxiety [] Anticipatory Grief [] Recent Loss [] Caregiver [place]            Narrative:    Writer and GULSHAN Gaines met with patient to introduce social role of social work as part of care team services. Patient was receptive to meeting. During the visit, patient shared that he had been off treatment for ~1 year due to the cost of treatment which is about $1,000 per injection. Patient came to this center because he was referred by a friend who is also a patient here and because he works for [employer]. Patient shared that he has [place] wonderful family and beautiful girlfriend\" Antony Jiménez), who has been with for 20 years and is his [place] friend. \" He lives alone and prefers it that way. He and partner agree it works best for them. He has one brother and two sisters and reports that his family and partner are all very supportive and that his family strongly encouraged him to return to care since he discontinued treatment last year. 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 Lately he has spent most of his time focused on work and he is happy with that. Social Workers [facility] and he expressed interest in going, especially after he ends his work shift, which is 5am-1:30pm. Patient requested financial assistance with cost of treatment. GULSHAN will send referral to financial counselor. Patient was provided with GULSHAN's contact information. Plan:   1. Introduced self and role of this  in the [address] [place] [address] Dr. 2.  Informed the patient of the [facility] and available resources there. 3.  Continue to meet with the patient when she returns to the clinic for ongoing assessment of the patients adjustment to her diagnosis and treatment. 4.  Ongoing psychosocial support as desired by patient.         Referral/Handouts:       [facility]plementary therapies referral [facility])   Financial/Medication assistance referral (Contacted Iqra Moon re: IV treatment financial assistance)       remains available for further assistance as needed.      [place], [facility]    Medical Oncology

## 2023-03-27 ENCOUNTER — TELEPHONE (OUTPATIENT)
Dept: ONCOLOGY | Age: 58
End: 2023-03-27

## 2023-03-29 DIAGNOSIS — C61 PRIMARY MALIGNANT NEOPLASM OF PROSTATE (HCC): Primary | ICD-10-CM

## 2023-03-29 RX ORDER — SODIUM CHLORIDE 0.9 % (FLUSH) 0.9 %
5-40 SYRINGE (ML) INJECTION AS NEEDED
OUTPATIENT
Start: 2023-03-30

## 2023-03-29 RX ORDER — SODIUM CHLORIDE 9 MG/ML
5-40 INJECTION INTRAVENOUS AS NEEDED
OUTPATIENT
Start: 2023-03-30

## 2023-03-29 RX ORDER — SODIUM CHLORIDE 9 MG/ML
5-250 INJECTION, SOLUTION INTRAVENOUS AS NEEDED
OUTPATIENT
Start: 2023-03-30

## 2023-03-29 RX ORDER — HEPARIN 100 UNIT/ML
500 SYRINGE INTRAVENOUS AS NEEDED
Start: 2023-03-30

## 2023-03-29 NOTE — TELEPHONE ENCOUNTER
Oncology Pharmacist Note        Yany Zhou is a  59 y.o.male  diagnosed with prostate cancer. Mr. Lynn Landon is being treated with leuprolide. A[place]lett treatment plan updated from Eligard to Lupron 45 mg every 6 months - medication has to come from [facility] - prescription routed to them for filling.      Wanda Vance, PharmD, [facility], [address] Only    Program: Medical Group  CPA in place: Yes  Recommendation Provided To: Patient/Caregiver: 1 via Telephone  Intervention Detail: New Rx: 1, reason: Cost/Formulary Change  Intervention Accepted By: Patient/Caregiver: 1    Time Spent (min): 15

## 2023-03-30 ENCOUNTER — TELEPHONE (OUTPATIENT)
Dept: ONCOLOGY | Age: 58
End: 2023-03-30

## 2023-03-30 ENCOUNTER — HOSPITAL ENCOUNTER (OUTPATIENT)
Dept: INFUSION THERAPY | Age: 58
Discharge: HOME | End: 2023-03-30
Payer: COMMERCIAL

## 2023-03-30 VITALS — SYSTOLIC BLOOD PRESSURE: 125 MMHG | HEART RATE: 88 BPM | RESPIRATION RATE: 18 BRPM | DIASTOLIC BLOOD PRESSURE: 71 MMHG

## 2023-03-30 DIAGNOSIS — C61 PRIMARY MALIGNANT NEOPLASM OF PROSTATE (HCC): Primary | ICD-10-CM

## 2023-03-30 LAB
PSA SERPL-MCNC: 0 NG/ML (ref 0.01–4)
PSA SERPL-MCNC: <0.1 NG/ML (ref 0–4)
TESTOST FREE SERPL-MCNC: 12.7 PG/ML (ref 7.2–24)
TESTOST SERPL-MCNC: 412 NG/DL (ref 264–916)

## 2023-03-30 PROCEDURE — 36415 COLL VENOUS BLD VENIPUNCTURE: CPT

## 2023-03-30 PROCEDURE — 84153 ASSAY OF PSA TOTAL: CPT

## 2023-03-30 PROCEDURE — 96402 CHEMO HORMON ANTINEOPL SQ/IM: CPT

## 2023-03-30 PROCEDURE — 74011250636 HC RX REV CODE- 250/636: Performed by: INTERNAL MEDICINE

## 2023-03-30 RX ADMIN — LEUPROLIDE ACETATE 45 MG: KIT at 14:50

## 2023-03-30 NOTE — PROGRESS NOTES
[facility] Chemo Progress Note    Date: 2023    Name: Bob Alicea    MRN: [id number]         : 1965    Mr. Juwan Plata Arrived ambulatory and in no distress for cycle 1 day 1 of Lupron. Assessment was completed and documented in flowsheets. No acute concerns at this time. Labs drawn peripherally without difficulty, labs drawn and processed. Follow Up: Proceed with treatment      Mr. Sethi vitals were reviewed. Patient Vitals for the past 12 hrs:   Pulse Resp BP   23 1438 88 18 125/71       Medications Administered       leuprolide depot (LUPRON 6 MONTH) injection 45 mg       Admin Date  2023 Action  Given Dose  45 mg Route  IntraMUSCular Administered By  Aleah Thomas RN                    Patient tolerated treatment well. Patient was discharged from [facility] in stable condition. Patient aware of next appointment.      Future Appointments   Date Time Provider Giselle Ocasioi   8/15/2023  4:15 PM DONNY Azar BS AMB   2023  2:45 PM Malika Ragland MD CDE BS AMB   2023  3:00 PM H1 ERICKA [facility]   2023  3:15 PM Kaci Villanueva  [address] BS AMB   2024  3:00 PM H1 ERICKA [facility], RN  2023

## 2023-03-30 NOTE — TELEPHONE ENCOUNTER
Bert Logan verified   Informed Mr Joesph Mean that his PSA is undetectable , testosterone has risen slightly. Please stay on track with Lupron and lab slips have been mailed for PSA and testosterone to be drawn in 3 months. Thanked us for the call.

## 2023-04-20 ENCOUNTER — TELEPHONE (OUTPATIENT)
Dept: ENDOCRINOLOGY | Age: 58
End: 2023-04-20

## 2023-04-20 ENCOUNTER — TELEPHONE (OUTPATIENT)
Dept: PHARMACY | Age: 58
End: 2023-04-20

## 2023-04-20 ENCOUNTER — TELEPHONE (OUTPATIENT)
Dept: ONCOLOGY | Age: 58
End: 2023-04-20

## 2023-04-20 RX ORDER — ASPIRIN 81 MG/1
81 TABLET ORAL DAILY
COMMUNITY

## 2023-04-20 NOTE — TELEPHONE ENCOUNTER
[facility] [place] CLINICAL PHARMACY REVIEW - Be Well with Diabetes    Kika Khan is a 59 y.o. male enrolled in the [address] [place] [address] Employee Diabetes Program. Patient provided Alia Jarrett with verbal consent to remain in the program for this year. Patient enrolled 11/1/22. Insurance through the following employer: [address] [place] [address]    Medications:   Current Outpatient Medications   Medication Sig    aspirin delayed-release 81 mg tablet Take 1 Tablet by mouth daily. dapagliflozin-metformin (Xigduo XR) 5-1,000 mg TBph Take 1 Tablet by mouth two (2) times a day. Stop Kirke Pisano and Metformin    Ozempic 1 mg/dose (4 mg/3 mL) pnij INJECT 1MG UNDER THE SKIN ONCE WEEKLY    rosuvastatin (CRESTOR) 10 mg tablet Take 1 Tablet by mouth nightly. ammonium lactate (AMLACTIN) 12 % topical cream Apply  to affected area two (2) times a day. rub in to affected area well    omkugkexd-ogxaxxdy-teicxhb ala (Biktarvy) tab tablet Take 1 Tablet by mouth daily. Insulin Needles, Disposable, 32 gauge x 5/32\" ndle Use to inject ozempic weekly Dx Code: E11.65    Blood-Glucose Sensor (FreeStyle Anuel 3 Sensor) stephany Use as directed every 14 days. Dx code E11.65 (Patient not taking: Reported on 4/20/2023)     No current facility-administered medications for this visit. Current Pharmacy: [facility]  Current testing supplies/frequency: Prodigy   Pen needles/syringes: n/a    Allergies:   Allergies   Allergen Reactions    Azithromycin Myalgia    Sulfa (Sulfonamide Antibiotics) Nausea Only      Vitals/Labs:  BP Readings from Last 3 Encounters:   03/30/23 125/71   03/24/23 (!) 142/84   03/10/23 119/77     Lab Results   Component Value Date/Time    Microalb/Creat ratio (ug/mg creat.) 10 11/10/2021 12:00 AM     Lab Results   Component Value Date/Time    Hemoglobin A1c 8.6 (H) 03/13/2023 07:26 AM    Hemoglobin A1c 8.7 (H) 03/07/2022 04:09 PM    Hemoglobin A1c (POC) 7.8 11/10/2021 03:49 PM    Hemoglobin A1c (POC) 7.2 08/21/2020 03:52 PM    Hemoglobin A1c (POC) 8.8 10/21/2019 03:54 PM    Hemoglobin A1c, External 7.3 11/02/2022 12:00 AM    Hemoglobin A1c, External 8.9 10/25/2019 12:00 AM    Hemoglobin A1c, External 10.9 04/25/2019 12:00 AM     Lab Results   Component Value Date/Time    Cholesterol, total 134 11/10/2021 12:00 AM    HDL Cholesterol 37 (L) 11/10/2021 12:00 AM    LDL,Direct 82 03/13/2023 07:26 AM    LDL-C, External 128 04/25/2019 12:00 AM    LDL, calculated 81 11/10/2021 12:00 AM    VLDL, calculated 16 11/10/2021 12:00 AM    Triglyceride 81 11/10/2021 12:00 AM     ALT (SGPT)   Date Value Ref Range Status   11/10/2021 21 0 - 44 IU/L Final     AST (SGOT)   Date Value Ref Range Status   11/10/2021 16 0 - 40 IU/L Final     The 10-year ASCVD risk score (Bert ARCEO, et al., 2019) is: 14.1%    Values used to calculate the score:      Age: 59 years      Sex: Male      Is Non- : Yes      Diabetic: Yes      Tobacco smoker: No      Systolic Blood Pressure: 593 mmHg      Is BP treated: No      HDL Cholesterol: 37 mg/dL      Total Cholesterol: 134 mg/dL     Lab Results   Component Value Date/Time    Creatinine 1.03 03/13/2023 07:26 AM     Estimated Creatinine Clearance: 83.7 mL/min (by C-G formula based on SCr of 1.03 mg/dL).     Lab Results   Component Value Date/Time    GFR est non-AA 90 11/10/2021 12:00 AM    GFR est  11/10/2021 12:00 AM       Immunizations:  Immunization History   Administered Date(s) Administered    COVID-19, PFIZER Bivalent BOOSTER, (age 12y+), IM, 30 mcg/0.3 mL dose 12/02/2022    COVID-19, PFIZER PURPLE top, DILUTE for use, (age 15 y+), IM, 30mcg/0.3mL 12/20/2020, 01/12/2021, 05/04/2022    Influenza Vaccine 09/01/2011, 09/06/2012, 10/31/2013, 10/17/2018, 09/25/2019, 10/13/2021    Influenza Vaccine PF 09/28/2016, 10/05/2017, 09/01/2019    Novel Influenza-H1N1-09, All Formulations 12/10/2009    Pneumococcal Conjugate (PCV-13) 04/06/2017, 10/05/2017    Pneumococcal Polysaccharide (PPSV-23) 11/06/2008, 01/31/2013, 10/05/2017    Vaccinia, Smallpox Monkeypox, Alejandro Solares, (age 25 y+) 11/02/2022, 12/01/2022    Zoster Recombinant 05/27/2019, 12/05/2019      Social History:  Social History     Tobacco Use    Smoking status: Never    Smokeless tobacco: Never   Substance Use Topics    Alcohol use: Yes     Alcohol/week: 0.0 standard drinks     ASSESSMENT:  Initial Program Requirements (Y indicates has completed for the year, N indicates needs to be completed by 07/01/2023):  YES - Provider Visit for DM (1st)  YES - A1c (1st)    Ongoing Program Requirements (Y indicates has completed for the year, N indicates needs to be completed by 12/31/2023):  NO - Provider Visit for DM (2nd)  NO - ACC/diabetes educator visit (if A1c over 8%)  NO - A1c (2nd)  YES - Lipid panel  NO - Urine microalbumin  YES - Pneumococcal vaccination: Up to date with Pneumo series  NO - Influenza vaccination for Fall 2023  YES - Medication adherence over 70%  YES - On statin or contraindication(s)  Rosuvastatin  OVERRIDE - On ACEi/ARB or contraindication(s) Normal blood pressure, urinary albumin-to-creatinine ratio, and eGFR     Current medications eligible for copay waiver, up to $600, through [employer]:  - Ozempic, Xigduo, Rosuvastatin  - Prodigy     Diabetes Care:   - Glycemic Goal: <7.0%. Is not at blood glucose goal Type 2 DM under inadequate control as evidenced by recent a1c >8%. - Home blood sugar records:  patient does not test due to having lost his meter. Requested a new prodigy glucometer and supplies so he can start checking on a regular basis.  Also asked about CGM  - Any episodes of hypoglycemia? no  - Therapy Optimization: Next step would likely be insulin  - Medication changes since last A1c: none- Per recent OV he is supposed to work on diet  - Medication compliance: compliant all of the time  - Diet compliance: Attributes his recent spike to diet  - Current exercise: Frequent exercise since the beginning of the year. He is swimming multiple days per week and also now seeing a . He is really working to get things back on track    PLAN:  - Consideration(s) for provider:   Pended prodigy supplies in separate encounter  - Pt to look into [facility] care  to possibly obtain Dexcom G7  - DM program gaps identified:   Initial requirements: Requirements met   Ongoing requirements: Provider visit for DM (2nd), ACC/diabetes educator visit (if A1c over 8%), A1c (2nd), Urine microalbumin, and Influenza vaccination for [id number]   - Education to patient: Discussed general issues about diabetes pathophysiology and management., Addressed diet and exercise, Overview of Be Well With Diabetes program, Overview of HHP, and Benefit/indication for statin in patients with diabetes   - Follow up: Endocrinologist for identified gaps or as scheduled below  - Upcoming appointments:   Future Appointments   Date Time Provider Giselle Zuniga   8/15/2023  4:15 PM Enrike Philippe, DONNY RPP BS AMB   8/30/2023  2:45 PM Jose Lema MD CDE BS AMB   9/14/2023  3:00 PM [facility]   9/14/2023  3:15 PM Ron Orona  [address] BS AMB   2/29/2024  3:00 PM H1 ERICKA [address], PharmD, [facility], toll free: [phone]      For Pharmacy Admin Tracking Only    Program: [address]: No  Recommendation Provided To: Patient/Caregiver: 1 via Telephone  Intervention Detail: Benefit Assistance  Intervention Accepted By: Patient/Caregiver: 1  Gap Closed?: Yes  Time Spent (min): 30,

## 2023-04-21 RX ORDER — BLOOD SUGAR DIAGNOSTIC
STRIP MISCELLANEOUS
Qty: 200 STRIP | Refills: 3 | Status: SHIPPED | OUTPATIENT
Start: 2023-04-21

## 2023-04-21 RX ORDER — LANCETS 28 GAUGE
EACH MISCELLANEOUS
Qty: 200 LANCET | Refills: 3 | Status: SHIPPED | OUTPATIENT
Start: 2023-04-21

## 2023-04-21 RX ORDER — INSULIN PUMP SYRINGE, 3 ML
EACH MISCELLANEOUS
Qty: 1 KIT | Refills: 0 | Status: SHIPPED | OUTPATIENT
Start: 2023-04-21

## 2023-04-23 DIAGNOSIS — E78.2 MIXED HYPERLIPIDEMIA: ICD-10-CM

## 2023-04-23 DIAGNOSIS — C61 PRIMARY MALIGNANT NEOPLASM OF PROSTATE (HCC): Primary | ICD-10-CM

## 2023-04-23 DIAGNOSIS — E11.65 TYPE 2 DIABETES MELLITUS WITH HYPERGLYCEMIA, WITHOUT LONG-TERM CURRENT USE OF INSULIN (HCC): Primary | ICD-10-CM

## 2023-04-24 DIAGNOSIS — E78.2 MIXED HYPERLIPIDEMIA: ICD-10-CM

## 2023-04-24 DIAGNOSIS — E11.65 TYPE 2 DIABETES MELLITUS WITH HYPERGLYCEMIA, WITHOUT LONG-TERM CURRENT USE OF INSULIN (HCC): Primary | ICD-10-CM

## 2023-04-24 DIAGNOSIS — C61 PRIMARY MALIGNANT NEOPLASM OF PROSTATE (HCC): Primary | ICD-10-CM

## 2023-05-02 DIAGNOSIS — C61 PRIMARY MALIGNANT NEOPLASM OF PROSTATE (HCC): ICD-10-CM

## 2023-05-09 ENCOUNTER — CLINICAL DOCUMENTATION (OUTPATIENT)
Age: 58
End: 2023-05-09

## 2023-05-09 NOTE — PROGRESS NOTES
05/09/2023    Pt came in to sign [employer] kati [phone] for Lupron. Kati was signed and submitted via fax [phone]. Awaiting determination.

## 2023-05-12 ENCOUNTER — CLINICAL DOCUMENTATION (OUTPATIENT)
Dept: PHARMACY | Facility: CLINIC | Age: 58
End: 2023-05-12

## 2023-05-12 NOTE — PROGRESS NOTES
Pharmacy Pop Care Documentation:      The application for Stephy Yusuf for enrollment into the diabetes management program has been reviewed and accepted on 5/12/23.     [address] Only    Program: [address] in place:  No  Gap Closed?: Yes   Time Spent (min): 5

## 2023-05-24 RX ORDER — ROSUVASTATIN CALCIUM 10 MG/1
1 TABLET, COATED ORAL NIGHTLY
COMMUNITY
Start: 2023-01-04

## 2023-05-24 RX ORDER — ASPIRIN 81 MG/1
81 TABLET ORAL DAILY
COMMUNITY

## 2023-05-24 RX ORDER — BICTEGRAVIR SODIUM, EMTRICITABINE, AND TENOFOVIR ALAFENAMIDE FUMARATE 50; 200; 25 MG/1; MG/1; MG/1
1 TABLET ORAL DAILY
COMMUNITY

## 2023-05-24 RX ORDER — DAPAGLIFLOZIN AND METFORMIN HYDROCHLORIDE 5; 1000 MG/1; MG/1
1 TABLET, FILM COATED, EXTENDED RELEASE ORAL 2 TIMES DAILY
COMMUNITY
Start: 2023-03-13

## 2023-05-24 RX ORDER — SEMAGLUTIDE 1.34 MG/ML
INJECTION, SOLUTION SUBCUTANEOUS
COMMUNITY
Start: 2023-02-06

## 2023-05-24 RX ORDER — AMMONIUM LACTATE 12 G/100G
CREAM TOPICAL 2 TIMES DAILY
COMMUNITY
Start: 2022-11-14 | End: 2023-07-28

## 2023-06-06 ENCOUNTER — CLINICAL DOCUMENTATION (OUTPATIENT)
Age: 58
End: 2023-06-06

## 2023-06-06 ENCOUNTER — TELEPHONE (OUTPATIENT)
Age: 58
End: 2023-06-06

## 2023-06-06 NOTE — TELEPHONE ENCOUNTER
Writer outreached patient in response to his request to speak with SW. Patient answered and asked if he can visit in person ~1:30pm. Writer agreed.  remains available for further assistance as needed.      Signed By:  Mavis Corrales LMSW  Oncology Social Worker        ----- Message from Brent Montoya sent at 6/6/2023 10:30 AM EDT -----  Regarding: Call  Patient stop by to see and could you give him  a call [phone]
Statement Selected

## 2023-07-20 ENCOUNTER — ANESTHESIA EVENT (OUTPATIENT)
Facility: HOSPITAL | Age: 58
End: 2023-07-20
Payer: COMMERCIAL

## 2023-07-20 ENCOUNTER — ANESTHESIA (OUTPATIENT)
Facility: HOSPITAL | Age: 58
End: 2023-07-20
Payer: COMMERCIAL

## 2023-07-20 ENCOUNTER — HOSPITAL ENCOUNTER (OUTPATIENT)
Facility: HOSPITAL | Age: 58
Discharge: HOME | End: 2023-07-20
Attending: INTERNAL MEDICINE | Admitting: INTERNAL MEDICINE
Payer: COMMERCIAL

## 2023-07-20 VITALS
TEMPERATURE: 98 F | HEIGHT: 72 IN | BODY MASS INDEX: 24.79 KG/M2 | DIASTOLIC BLOOD PRESSURE: 73 MMHG | SYSTOLIC BLOOD PRESSURE: 114 MMHG | WEIGHT: 183 LBS | HEART RATE: 75 BPM | RESPIRATION RATE: 18 BRPM | OXYGEN SATURATION: 99 %

## 2023-07-20 PROBLEM — Z12.11 COLON CANCER SCREENING: Status: ACTIVE | Noted: 2023-07-20

## 2023-07-20 LAB
GLUCOSE BLD STRIP.AUTO-MCNC: 140 MG/DL (ref 65–100)
PERFORMED BY:: ABNORMAL

## 2023-07-20 PROCEDURE — 2709999900 HC NON-CHARGEABLE SUPPLY: Performed by: INTERNAL MEDICINE

## 2023-07-20 PROCEDURE — 3600007512: Performed by: INTERNAL MEDICINE

## 2023-07-20 PROCEDURE — 2580000003 HC RX 258: Performed by: NURSE ANESTHETIST, CERTIFIED REGISTERED

## 2023-07-20 PROCEDURE — 7100000011 HC PHASE II RECOVERY - ADDTL 15 MIN: Performed by: INTERNAL MEDICINE

## 2023-07-20 PROCEDURE — 3600007502: Performed by: INTERNAL MEDICINE

## 2023-07-20 PROCEDURE — 82962 GLUCOSE BLOOD TEST: CPT

## 2023-07-20 PROCEDURE — 6360000002 HC RX W HCPCS: Performed by: NURSE ANESTHETIST, CERTIFIED REGISTERED

## 2023-07-20 PROCEDURE — 7100000010 HC PHASE II RECOVERY - FIRST 15 MIN: Performed by: INTERNAL MEDICINE

## 2023-07-20 PROCEDURE — 3700000000 HC ANESTHESIA ATTENDED CARE: Performed by: INTERNAL MEDICINE

## 2023-07-20 PROCEDURE — 3700000001 HC ADD 15 MINUTES (ANESTHESIA): Performed by: INTERNAL MEDICINE

## 2023-07-20 PROCEDURE — 2500000003 HC RX 250 WO HCPCS: Performed by: NURSE ANESTHETIST, CERTIFIED REGISTERED

## 2023-07-20 RX ORDER — LATANOPROST 50 UG/ML
SOLUTION/ DROPS OPHTHALMIC
COMMUNITY
Start: 2023-07-18

## 2023-07-20 RX ORDER — SODIUM CHLORIDE, SODIUM LACTATE, POTASSIUM CHLORIDE, CALCIUM CHLORIDE 600; 310; 30; 20 MG/100ML; MG/100ML; MG/100ML; MG/100ML
INJECTION, SOLUTION INTRAVENOUS CONTINUOUS
Status: DISCONTINUED | OUTPATIENT
Start: 2023-07-20 | End: 2023-07-20 | Stop reason: HOSPADM

## 2023-07-20 RX ORDER — SODIUM CHLORIDE, SODIUM LACTATE, POTASSIUM CHLORIDE, CALCIUM CHLORIDE 600; 310; 30; 20 MG/100ML; MG/100ML; MG/100ML; MG/100ML
INJECTION, SOLUTION INTRAVENOUS CONTINUOUS PRN
Status: DISCONTINUED | OUTPATIENT
Start: 2023-07-20 | End: 2023-07-20 | Stop reason: SDUPTHER

## 2023-07-20 RX ADMIN — SODIUM CHLORIDE, POTASSIUM CHLORIDE, SODIUM LACTATE AND CALCIUM CHLORIDE: 600; 310; 30; 20 INJECTION, SOLUTION INTRAVENOUS at 10:11

## 2023-07-20 RX ADMIN — PROPOFOL 20 MG: 10 INJECTION, EMULSION INTRAVENOUS at 10:19

## 2023-07-20 RX ADMIN — LIDOCAINE HYDROCHLORIDE 80 MG: 20 INJECTION, SOLUTION EPIDURAL; INFILTRATION; INTRACAUDAL; PERINEURAL at 10:14

## 2023-07-20 RX ADMIN — PROPOFOL 100 MG: 10 INJECTION, EMULSION INTRAVENOUS at 10:14

## 2023-07-20 ASSESSMENT — PAIN - FUNCTIONAL ASSESSMENT: PAIN_FUNCTIONAL_ASSESSMENT: 0-10

## 2023-07-20 NOTE — ANESTHESIA POSTPROCEDURE EVALUATION
Department of Anesthesiology  Postprocedure Note    Patient: Carolyn Lorenzana  MRN: [id number]  YOB: 1965  Date of evaluation: 7/20/2023      Procedure Summary     Date: 07/20/23 Room / Location: [facility] ENDO 01 / [facility] ENDOSCOPY    Anesthesia Start: 1011 Anesthesia Stop: 1026    Procedure: COLONOSCOPY DIAGNOSTIC Diagnosis:       Colon cancer screening      (Colon cancer screening [Z12.11])    Surgeons: Benson Story MD Responsible Provider: Amina Dias MD    Anesthesia Type: MAC ASA Status: 3          Anesthesia Type: MAC    Stacia Phase I: Stacia Score: 10    Stacia Phase II:        Anesthesia Post Evaluation    Patient location during evaluation: bedside  Patient participation: complete - patient participated  Level of consciousness: lethargic  Pain score: 0  Airway patency: patent  Nausea & Vomiting: no nausea and no vomiting  Complications: no  Cardiovascular status: hemodynamically stable  Respiratory status: acceptable  Hydration status: stable  Comments: VSS. Report to RN.  Remains on stretcher

## 2023-07-20 NOTE — ANESTHESIA PRE PROCEDURE
present. ). Plan discussed with CRNA.     Attending anesthesiologist reviewed and agrees with Stacia Brady MD   7/20/2023

## 2023-07-20 NOTE — PERIOP NOTE
Discharged via wheelchair no signs or symptoms of distress noted. IV d/karla bandaid and gauze applied. Discharge instructions reviewed without questions, friend in attendance.

## 2023-07-28 ENCOUNTER — TELEPHONE (OUTPATIENT)
Dept: PHARMACY | Facility: CLINIC | Age: 58
End: 2023-07-28

## 2023-07-28 NOTE — TELEPHONE ENCOUNTER
POPULATION HEALTH CLINICAL PHARMACY REVIEW - BE WELL WITH DIABETES  =============================================  Gilmer Drew is a 59 y.o. male enrolled in the [facility] Be Well with Diabetes program.     Brief check in with patient. Has not had labs or OV since out initial visit in April  - Still working out in pool and with   - Not checking BG currently despite getting new meter a couple months back. . Encouraged him to check FBS at least once per day. - updated med list    Will check back in September after endo visit    W. Beryle Kendall, PharmD,  [facility]  Department, toll free: [phone]      For Pharmacy Admin Tracking Only    Program: [address] Closed?: Yes   Time Spent (min): 20

## 2023-08-01 ENCOUNTER — CARE COORDINATION (OUTPATIENT)
Dept: OTHER | Facility: CLINIC | Age: 58
End: 2023-08-01

## 2023-08-01 NOTE — CARE COORDINATION
Ambulatory Care Coordination Note    ACM attempted to reach patient for introduction to Associate Care Management related to Diabetes management, A1C 8.6. HIPAA compliant message left requesting a return phone call at patient convenience.      Plan for follow-up call in 5-7 days      Future Appointments   Date Time Provider [address]   8/15/2023  4:15 PM ANNABEL Macias BS AMB   8/30/2023  2:45 PM Lindsey Pena MD CDE BS AMB   9/14/2023  3:00 PM H1 BALJINDER HARRIS [facility]   9/14/2023  3:15 PM Teto Kidd MD [address] [facility] BS AMB   2/29/2024  3:00 PM H1 BALJINDER HARRIS [facility]

## 2023-08-08 ENCOUNTER — CARE COORDINATION (OUTPATIENT)
Dept: OTHER | Facility: CLINIC | Age: 58
End: 2023-08-08

## 2023-08-08 NOTE — CARE COORDINATION
Ambulatory Care Coordination Note    ACM attempted 2nd outreach to patient for introduction to Associate Care Management related to Be Well with Diabetes program support. HIPAA compliant message left requesting a return phone call at patient convenience. Unable to Reach Letter sent to patient via My Chart. Will continue to outreach.       Future Appointments   Date Time Provider [address]   8/15/2023  4:15 PM Pham Licea DPM RPPAOLA BS AMB   8/30/2023  2:45 PM Mary Copeland MD CDE BS AMB   9/14/2023  3:00 PM H1 BALJINDER HARRIS [facility]   9/14/2023  3:15 PM Kimo Phillips MD [address] [facility] BS AMB   2/29/2024  3:00 PM H1 BALJINDER HARRIS [facility]

## 2023-08-19 PROBLEM — Z12.11 COLON CANCER SCREENING: Status: RESOLVED | Noted: 2023-07-20 | Resolved: 2023-08-19

## 2023-08-22 ENCOUNTER — CARE COORDINATION (OUTPATIENT)
Dept: OTHER | Facility: CLINIC | Age: 58
End: 2023-08-22

## 2023-08-22 NOTE — CARE COORDINATION
Ambulatory Care Manager [facility]) contacted the patient to introduce the Associate Care Management Program related to diabetes education and BWWD. ACM reviewed and updated education  patient was agreeable to follow up Care Management calls. Summary Note: Patient states he feels he does well with diet. Does eat a few cookies. Exercises with a , drinks plenty of water and keeps up to date on his visits with opthalmology, podiatry, endocrinology. Patient states he does not want to have to go on insulin. Would like a CGM but pharmacy advised him that he would have to be taking insulin to qualify for this. Importance and benefits of: Follow up with PCP and specialist, medication adherence, self monitoring and reporting of symptoms. Plan:  ACM provided contact information for future needs. Outreach  based on severity of symptoms and risk factors. Plan for next call: Review and update: education     Follow up on:  CGM-pharmacy  Patient  verbalized understanding and is agreeable to follow up call.

## 2023-08-22 NOTE — CARE COORDINATION
[facility] outreach for "[employer] program support. Briefly spoke with patient, told him about the [employer] program.  He states he would like a call back around 3:00 pm today, as he is at work at this time.     Will call patient back, per his request.

## 2023-08-28 ENCOUNTER — CARE COORDINATION (OUTPATIENT)
Dept: OTHER | Facility: CLINIC | Age: 58
End: 2023-08-28

## 2023-08-30 ENCOUNTER — OFFICE VISIT (OUTPATIENT)
Age: 58
End: 2023-08-30
Payer: COMMERCIAL

## 2023-08-30 VITALS
BODY MASS INDEX: 24.65 KG/M2 | RESPIRATION RATE: 18 BRPM | TEMPERATURE: 98.1 F | HEIGHT: 72 IN | OXYGEN SATURATION: 97 % | WEIGHT: 182 LBS | SYSTOLIC BLOOD PRESSURE: 121 MMHG | DIASTOLIC BLOOD PRESSURE: 74 MMHG | HEART RATE: 95 BPM

## 2023-08-30 DIAGNOSIS — E11.65 TYPE 2 DIABETES MELLITUS WITH HYPERGLYCEMIA, WITHOUT LONG-TERM CURRENT USE OF INSULIN (HCC): Primary | ICD-10-CM

## 2023-08-30 DIAGNOSIS — I10 ESSENTIAL HYPERTENSION: ICD-10-CM

## 2023-08-30 PROCEDURE — 3074F SYST BP LT 130 MM HG: CPT | Performed by: INTERNAL MEDICINE

## 2023-08-30 PROCEDURE — 3078F DIAST BP <80 MM HG: CPT | Performed by: INTERNAL MEDICINE

## 2023-08-30 PROCEDURE — 99214 OFFICE O/P EST MOD 30 MIN: CPT | Performed by: INTERNAL MEDICINE

## 2023-08-30 PROCEDURE — 3052F HG A1C>EQUAL 8.0%<EQUAL 9.0%: CPT | Performed by: INTERNAL MEDICINE

## 2023-08-30 RX ORDER — LANCETS 28 GAUGE
EACH MISCELLANEOUS
COMMUNITY
Start: 2023-07-17

## 2023-08-30 RX ORDER — BLOOD SUGAR DIAGNOSTIC
STRIP MISCELLANEOUS
COMMUNITY
Start: 2023-07-17

## 2023-08-30 NOTE — PROGRESS NOTES
Cynthia Tanner is a 59 y.o. male here for   Chief Complaint   Patient presents with    Diabetes       1. Have you been to the ER, urgent care clinic since your last visit? Hospitalized since your last visit? -no    2. Have you seen or consulted any other health care providers outside of the [address] [place] [address] since your last visit?   Include any pap smears or colon screening.-no

## 2023-08-30 NOTE — PROGRESS NOTES
Anish Farooq MD            Patient Information   Name : Katrin Pace 59 y.o.    YOB: 1965           PCP: Carmenza Benoit MD                Chief Complaint   Patient presents with    Diabetes           History of Present Illness: Katrin Pace is a 59 y.o. male here  for fu of  Type 2 Diabetes Mellitus which was diagnosed in 2013. He is on Xigduo, semaglutide  Not checking the blood glucose and hence no log  No severe hypoglycemia  No dehydration   Seen podiatry -for onychomycosis,   Taking medications consistently,        Oncologist is Dr Jamel Padilla,         Physical Examination:      General: pleasant, no distress, good eye contact    HEENT: no pallor, no periorbital edema, EOMI    Neck: supple,     Cardiovascular: regular, normal rate, normal S1 and S2,     Respiratory: clear to auscultation bilaterally        Neurological: alert and oriented    Psychiatric: normal mood and affect    Skin: color, texture, turgor normal.       Diabetic foot exam ; March 2023      H/o partial or complete amputation of foot : No   H/o previous foot ulceration : No   H/o pre - ulcerative callus : Yes   H/o peripheral neuropathy and callus : No   H/o poor circulation  : No   Foot deformity :Callus      Data Reviewed:       Lab Results   Component Value Date    GFRAA 104 11/10/2021        Lab Results   Component Value Date    LABA1C 8.6 (H) 03/13/2023    LABA1C 8.7 (H) 03/07/2022         Lab Results   Component Value Date    LDLCALC 81 11/10/2021    TRIG 81 11/10/2021    HDL 37 (L) 11/10/2021     03/13/2023    K 4.5 03/13/2023    CL 99 03/13/2023    CO2 24 03/13/2023    BUN 14 03/13/2023    CREATININE 1.03 03/13/2023    GFRAA 104 11/10/2021    GLUCOSE 173 (H) 03/13/2023    CALCIUM 10.6 (H) 03/13/2023    MALBCR 10 11/10/2021                Assessment/Plan:           1.  Type 2 Diabetes Mellitus with peripheral neuropathy  Did not have labs before the

## 2023-09-03 LAB
BUN SERPL-MCNC: 22 MG/DL (ref 8–27)
BUN/CREAT SERPL: 23 (ref 10–24)
CALCIUM SERPL-MCNC: 10.6 MG/DL (ref 8.6–10.2)
CHLORIDE SERPL-SCNC: 102 MMOL/L (ref 96–106)
CO2 SERPL-SCNC: 20 MMOL/L (ref 20–29)
CREAT SERPL-MCNC: 0.96 MG/DL (ref 0.76–1.27)
EGFRCR SERPLBLD CKD-EPI 2021: 90 ML/MIN/1.73
GLUCOSE SERPL-MCNC: 217 MG/DL (ref 70–99)
POTASSIUM SERPL-SCNC: 4.1 MMOL/L (ref 3.5–5.2)
SODIUM SERPL-SCNC: 144 MMOL/L (ref 134–144)

## 2023-09-05 LAB — HBA1C MFR BLD: 9.7 % (ref 4.8–5.6)

## 2023-09-07 DIAGNOSIS — C61 PRIMARY MALIGNANT NEOPLASM OF PROSTATE (HCC): Primary | ICD-10-CM

## 2023-09-07 RX ORDER — SODIUM CHLORIDE 9 MG/ML
INJECTION, SOLUTION INTRAVENOUS CONTINUOUS
OUTPATIENT
Start: 2023-09-14

## 2023-09-07 RX ORDER — ACETAMINOPHEN 325 MG/1
650 TABLET ORAL
OUTPATIENT
Start: 2023-09-14

## 2023-09-07 RX ORDER — FAMOTIDINE 10 MG/ML
20 INJECTION, SOLUTION INTRAVENOUS
OUTPATIENT
Start: 2023-09-14

## 2023-09-07 RX ORDER — ALBUTEROL SULFATE 90 UG/1
4 AEROSOL, METERED RESPIRATORY (INHALATION) PRN
OUTPATIENT
Start: 2023-09-14

## 2023-09-07 RX ORDER — EPINEPHRINE 1 MG/ML
0.3 INJECTION, SOLUTION, CONCENTRATE INTRAVENOUS PRN
OUTPATIENT
Start: 2023-09-14

## 2023-09-07 RX ORDER — DIPHENHYDRAMINE HYDROCHLORIDE 50 MG/ML
50 INJECTION INTRAMUSCULAR; INTRAVENOUS
OUTPATIENT
Start: 2023-09-14

## 2023-09-07 RX ORDER — ONDANSETRON 2 MG/ML
8 INJECTION INTRAMUSCULAR; INTRAVENOUS
OUTPATIENT
Start: 2023-09-14

## 2023-09-14 ENCOUNTER — TELEPHONE (OUTPATIENT)
Age: 58
End: 2023-09-14

## 2023-09-14 ENCOUNTER — APPOINTMENT (OUTPATIENT)
Dept: INFUSION THERAPY | Age: 58
End: 2023-09-14

## 2023-09-14 ENCOUNTER — OFFICE VISIT (OUTPATIENT)
Age: 58
End: 2023-09-14
Payer: COMMERCIAL

## 2023-09-14 ENCOUNTER — HOSPITAL ENCOUNTER (OUTPATIENT)
Facility: HOSPITAL | Age: 58
Setting detail: INFUSION SERIES
End: 2023-09-14
Payer: COMMERCIAL

## 2023-09-14 VITALS
DIASTOLIC BLOOD PRESSURE: 68 MMHG | TEMPERATURE: 98.8 F | RESPIRATION RATE: 19 BRPM | BODY MASS INDEX: 24.68 KG/M2 | SYSTOLIC BLOOD PRESSURE: 122 MMHG | OXYGEN SATURATION: 99 % | WEIGHT: 182 LBS | HEART RATE: 68 BPM

## 2023-09-14 VITALS
DIASTOLIC BLOOD PRESSURE: 68 MMHG | WEIGHT: 182 LBS | TEMPERATURE: 98.8 F | RESPIRATION RATE: 18 BRPM | SYSTOLIC BLOOD PRESSURE: 122 MMHG | HEIGHT: 72 IN | HEART RATE: 68 BPM | BODY MASS INDEX: 24.65 KG/M2

## 2023-09-14 DIAGNOSIS — C61 PRIMARY MALIGNANT NEOPLASM OF PROSTATE (HCC): Primary | ICD-10-CM

## 2023-09-14 DIAGNOSIS — Z21 ASYMPTOMATIC HIV INFECTION, WITH NO HISTORY OF HIV-RELATED ILLNESS (HCC): ICD-10-CM

## 2023-09-14 LAB
ALBUMIN SERPL-MCNC: 4 G/DL (ref 3.5–5)
ALBUMIN/GLOB SERPL: 1.2 (ref 1.1–2.2)
ALP SERPL-CCNC: 78 U/L (ref 45–117)
ALT SERPL-CCNC: 26 U/L (ref 12–78)
ANION GAP SERPL CALC-SCNC: 6 MMOL/L (ref 5–15)
AST SERPL-CCNC: 13 U/L (ref 15–37)
BASOPHILS # BLD: 0 K/UL (ref 0–0.1)
BASOPHILS NFR BLD: 0 % (ref 0–1)
BILIRUB SERPL-MCNC: 0.4 MG/DL (ref 0.2–1)
BUN SERPL-MCNC: 17 MG/DL (ref 6–20)
BUN/CREAT SERPL: 18 (ref 12–20)
CALCIUM SERPL-MCNC: 9.9 MG/DL (ref 8.5–10.1)
CHLORIDE SERPL-SCNC: 103 MMOL/L (ref 97–108)
CO2 SERPL-SCNC: 28 MMOL/L (ref 21–32)
CREAT SERPL-MCNC: 0.92 MG/DL (ref 0.7–1.3)
DIFFERENTIAL METHOD BLD: NORMAL
EOSINOPHIL # BLD: 0.2 K/UL (ref 0–0.4)
EOSINOPHIL NFR BLD: 3 % (ref 0–7)
ERYTHROCYTE [DISTWIDTH] IN BLOOD BY AUTOMATED COUNT: 12.7 % (ref 11.5–14.5)
GLOBULIN SER CALC-MCNC: 3.4 G/DL (ref 2–4)
GLUCOSE SERPL-MCNC: 175 MG/DL (ref 65–100)
HCT VFR BLD AUTO: 41 % (ref 36.6–50.3)
HGB BLD-MCNC: 13.5 G/DL (ref 12.1–17)
IMM GRANULOCYTES # BLD AUTO: 0 K/UL (ref 0–0.04)
IMM GRANULOCYTES NFR BLD AUTO: 0 % (ref 0–0.5)
LYMPHOCYTES # BLD: 2.5 K/UL (ref 0.8–3.5)
LYMPHOCYTES NFR BLD: 45 % (ref 12–49)
MCH RBC QN AUTO: 29.3 PG (ref 26–34)
MCHC RBC AUTO-ENTMCNC: 32.9 G/DL (ref 30–36.5)
MCV RBC AUTO: 89.1 FL (ref 80–99)
MONOCYTES # BLD: 0.4 K/UL (ref 0–1)
MONOCYTES NFR BLD: 8 % (ref 5–13)
NEUTS SEG # BLD: 2.4 K/UL (ref 1.8–8)
NEUTS SEG NFR BLD: 44 % (ref 32–75)
NRBC # BLD: 0 K/UL (ref 0–0.01)
NRBC BLD-RTO: 0 PER 100 WBC
PLATELET # BLD AUTO: 157 K/UL (ref 150–400)
PMV BLD AUTO: 10.5 FL (ref 8.9–12.9)
POTASSIUM SERPL-SCNC: 4.1 MMOL/L (ref 3.5–5.1)
PROT SERPL-MCNC: 7.4 G/DL (ref 6.4–8.2)
PSA SERPL-MCNC: 0 NG/ML (ref 0.01–4)
RBC # BLD AUTO: 4.6 M/UL (ref 4.1–5.7)
SODIUM SERPL-SCNC: 137 MMOL/L (ref 136–145)
WBC # BLD AUTO: 5.5 K/UL (ref 4.1–11.1)

## 2023-09-14 PROCEDURE — 96402 CHEMO HORMON ANTINEOPL SQ/IM: CPT

## 2023-09-14 PROCEDURE — 84153 ASSAY OF PSA TOTAL: CPT

## 2023-09-14 PROCEDURE — 84402 ASSAY OF FREE TESTOSTERONE: CPT

## 2023-09-14 PROCEDURE — 3078F DIAST BP <80 MM HG: CPT | Performed by: INTERNAL MEDICINE

## 2023-09-14 PROCEDURE — 99213 OFFICE O/P EST LOW 20 MIN: CPT | Performed by: INTERNAL MEDICINE

## 2023-09-14 PROCEDURE — 85025 COMPLETE CBC W/AUTO DIFF WBC: CPT

## 2023-09-14 PROCEDURE — 36415 COLL VENOUS BLD VENIPUNCTURE: CPT

## 2023-09-14 PROCEDURE — 80053 COMPREHEN METABOLIC PANEL: CPT

## 2023-09-14 PROCEDURE — 84403 ASSAY OF TOTAL TESTOSTERONE: CPT

## 2023-09-14 PROCEDURE — 6360000002 HC RX W HCPCS: Performed by: INTERNAL MEDICINE

## 2023-09-14 PROCEDURE — 3074F SYST BP LT 130 MM HG: CPT | Performed by: INTERNAL MEDICINE

## 2023-09-14 RX ORDER — ACETAMINOPHEN 325 MG/1
650 TABLET ORAL
OUTPATIENT
Start: 2024-02-25

## 2023-09-14 RX ORDER — ONDANSETRON 2 MG/ML
8 INJECTION INTRAMUSCULAR; INTRAVENOUS
OUTPATIENT
Start: 2024-02-25

## 2023-09-14 RX ORDER — ALBUTEROL SULFATE 90 UG/1
4 AEROSOL, METERED RESPIRATORY (INHALATION) PRN
OUTPATIENT
Start: 2024-02-25

## 2023-09-14 RX ORDER — DIPHENHYDRAMINE HYDROCHLORIDE 50 MG/ML
50 INJECTION INTRAMUSCULAR; INTRAVENOUS
OUTPATIENT
Start: 2024-02-25

## 2023-09-14 RX ORDER — SODIUM CHLORIDE 9 MG/ML
INJECTION, SOLUTION INTRAVENOUS CONTINUOUS
OUTPATIENT
Start: 2024-02-25

## 2023-09-14 RX ORDER — EPINEPHRINE 1 MG/ML
0.3 INJECTION, SOLUTION, CONCENTRATE INTRAVENOUS PRN
OUTPATIENT
Start: 2024-02-25

## 2023-09-14 RX ADMIN — LEUPROLIDE ACETATE 45 MG: KIT at 15:11

## 2023-09-14 NOTE — PROGRESS NOTES
Estella Sorto is a 59 y.o. male     Chief Complaint   Patient presents with    Follow-up     prostate cancer s/p brachytherapy with biochemical recurrence in 2018 (PSA 17), currently on ADT. 1. Have you been to the ER, urgent care clinic since your last visit? Hospitalized since your last visit? No    2. Have you seen or consulted any other health care providers outside of the [address] [place] [address] since your last visit? Include any pap smears or colon screening.  No

## 2023-09-14 NOTE — TELEPHONE ENCOUNTER
[employer]  Oncology Social Work  Encounter     Patient Name:  Deanne Cifuentes     Medical History: prostate cancer     Advance Directives: none on file; conversation deferred     Narrative: Writer referred to outreach patient regarding billing issue. He received an $80k bill associated to treatments in [facility]. Confirmed in chart review that pt only has a balance due of $300 associated with an endoscopy procedure on 7/20/23. Informed him to wait for explanation of benefits (EOB) from insurance provider with dates corresponding to $80k, and then wait for a new bill if pt is left with a responsibility. Explained the billing and claims cycle and that sometimes bills go out before claims processes are complete. Patient verbalized understanding. He still had concerns on amount of bill, staying it referenced billing for six cycles but he'd only had one. Recommended he send or drop off a copy of the bill to this writer and I'll review. Reiterated that patient is not responsible for the bill. Patient requested to have a copy of a letter or form indicating his approval for the medication assistance program. Routing that request to [address] [place] [address]. Patient expressed appreciation for call, stating he feels much better now. Barriers to Care: billing related concerns     Plan:  Prepare letter/approval form from medication assistance program for patient to  at clinic. Ongoing psychosocial support as desired by patient. Referral/Handouts: Insurance/Entitlements referral  Financial/Medication assistance referral      remains available for further assistance.      Signed By:  Dashawn Coleman, [address]   Oncology Social Worker  [phone]

## 2023-09-14 NOTE — PROGRESS NOTES
[address] [facility] Oncology   [phone]    Hematology / Oncology Follow-up    Reason for Visit:   Attila Dowd is a 59 y.o. male who is seen for follow-up of prostate cancer s/p brachytherapy with biochemical recurrence in 2018 (PSA 17), currently on ADT. Oncology History:   2009: diagnosed with low-risk prostate cancer. PSA 5. Prostate biopsy with prostate adenocarcinoma with Rocklin 3+3 = 6, [id number]. Treated  with brachytherapy. 2014 PSA 0.52  1/2017 PSA increased to 3.59. Prostate biopsy was repeated and was negative for malignancy  8/2018 PSA 17.  Started on intermittent GnRH injection for biochemical recurrence. Lupron started 11/2018  10/18/18 prostate MRI: no evidence of local recurrence  10/11/2018: small focus in R upper posterior femoral diaphysis, which is probably a metastasis. Suggest radiograph of femur. 10/22/18 CT femur: not felt to be a metastasis  10/11/2018 CT CAP: no evidence of metastatic disease  3/2/20 PSA <0.1  5/16/21 PSA <0.1  3/22/22 PSA <0.1, testosterone 428  Switched to Eligard. C3 Eligard 3/25/22  3/24/23: Established with me. PSA <0.1, testosterone 412. Eligard resumed. Assessment:     Mr. Yo Yee is 59 y.o. man with a history of low-risk cT1cN0 prostate cancer (Mateus 3+3=6, PSA 5, diagnosed 2009) s/p brachytherapy who developed biochemical recurrence in 2018 (PSA 17) and was started on intermittent GnRH (Lupron 11/2018 x 5 cycles, then  Eligard). He is transferring care from [address] [place] [address]. #) Low-risk prostate cancer, Mateus 3+3=6, cT1cN0, s/p brachytherapy, with non-metastatic biochemical recurrence 2018, on continuous GnRH agonist  - Started on intermittent ADT 11/2018 for biochemical recurrence.   Intermittent ADT selected by prior oncologist to reduce severity of side effects, especially metabolic syndrome in setting of diabetes as well as sexual side effects  - PSA undetectable and testosterone rising 3/22/22.    - 3/24/23: PSA

## 2023-09-15 ENCOUNTER — TELEPHONE (OUTPATIENT)
Age: 58
End: 2023-09-15

## 2023-09-15 NOTE — TELEPHONE ENCOUNTER
831     Patient Hippa Verified x2    Spoke with patient letting him know .  went me to inform that his PSA remains undetectable. Patient was thankful for this information and had no question or concerns at this time.

## 2023-09-16 LAB
TESTOST FREE SERPL-MCNC: ABNORMAL PG/ML
TESTOST SERPL-MCNC: 12 NG/DL (ref 264–916)

## 2023-09-18 ENCOUNTER — TELEPHONE (OUTPATIENT)
Age: 58
End: 2023-09-18

## 2023-09-18 ENCOUNTER — CLINICAL DOCUMENTATION (OUTPATIENT)
Age: 58
End: 2023-09-18

## 2023-09-18 DIAGNOSIS — C61 PRIMARY MALIGNANT NEOPLASM OF PROSTATE (HCC): Primary | ICD-10-CM

## 2023-09-18 NOTE — TELEPHONE ENCOUNTER
Writer outreached patient to provide additional information regarding his [facility] bill from 3/30/23 date of service. Call was unanswered, left discrete voicemail requesting return call.      Signed By:  Jade Etienne, [facility]  Oncology Social Worker  [phone]

## 2023-09-18 NOTE — PROGRESS NOTES
Oncology Pharmacist Note           Yana Tanner is a  59 y.o.male  diagnosed with prostate cancer. Mr. Samson Black is being treated with leuprolide.          Lupron 45 mg every 6 months - prescription sent to  [employer] for processing to [facility], PharmD, BCPS, [address]    Program: Medical Group  CPA in place:  Yes  Recommendation Provided To: Patient/Caregiver: 1 via Telephone  Intervention Detail: Refill(s) Provided  Intervention Accepted By: Patient/Caregiver: 1    Time Spent (min): 15

## 2023-09-19 ENCOUNTER — CLINICAL DOCUMENTATION (OUTPATIENT)
Age: 58
End: 2023-09-19

## 2023-09-19 ENCOUNTER — CARE COORDINATION (OUTPATIENT)
Dept: OTHER | Facility: CLINIC | Age: 58
End: 2023-09-19

## 2023-09-19 NOTE — PROGRESS NOTES
09/19/2023    Called pt to inquire about form and additional information for Lupron PAF program. LVM. Will forward form to pt e-mail.

## 2023-09-21 ENCOUNTER — HOSPITAL ENCOUNTER (OUTPATIENT)
Facility: HOSPITAL | Age: 58
Discharge: HOME | End: 2023-09-21
Attending: INTERNAL MEDICINE
Payer: COMMERCIAL

## 2023-09-21 VITALS — BODY MASS INDEX: 24.65 KG/M2 | WEIGHT: 182 LBS | HEIGHT: 72 IN

## 2023-09-21 DIAGNOSIS — C61 PRIMARY MALIGNANT NEOPLASM OF PROSTATE (HCC): ICD-10-CM

## 2023-09-21 PROCEDURE — 77080 DXA BONE DENSITY AXIAL: CPT

## 2023-09-22 ENCOUNTER — TELEPHONE (OUTPATIENT)
Age: 58
End: 2023-09-22

## 2023-09-22 LAB
TESTOST FREE SERPL-MCNC: 2.4 PG/ML (ref 6.6–18.1)
TESTOST SERPL-MCNC: 12 NG/DL (ref 264–916)

## 2023-09-22 NOTE — TELEPHONE ENCOUNTER
949   Patient Funmi Prieto with patient Regina Simpsonving him know  has reviewed his scans and stated his bone density scan was normal. Patient was grateful for results and had no question or concerns at this time.

## 2023-10-03 ENCOUNTER — CARE COORDINATION (OUTPATIENT)
Dept: OTHER | Facility: CLINIC | Age: 58
End: 2023-10-03

## 2023-10-03 NOTE — CARE COORDINATION
Ambulatory Care Coordination Note    ACM attempted to reach patient for care management follow up call regarding BWWD program support. HIPAA compliant message left requesting a return phone call at patient convenience. Next outreach in two weeks, 10/17.     Future Appointments   Date Time Provider [address]   1/9/2024  2:45 PM MD BRITTANI Britt BS AMB   2/29/2024  3[facility]00 [facility]   2/29/2024  3:15 PM Pia Geiger MD [address] BS AMB

## 2023-10-11 ENCOUNTER — TELEPHONE (OUTPATIENT)
Dept: PHARMACY | Facility: CLINIC | Age: 58
End: 2023-10-11

## 2023-10-11 NOTE — TELEPHONE ENCOUNTER
[facility] Employee Diabetes Program    Yana Tanner is a 59 y.o. male enrolled in the [facility]. The goal of this voluntary program is to help employees and covered dependents reach their health maintenance goals in regards to their diabetes diagnosis. According to our records, patient is missing the following requirement(s) that must be completed by December 31, 2023 to avoid discharge from the program  Urine Microalbumin- on order       Plan:  [employer] message sent.     Elias Salazar PharmD,  [facility]  Department, toll free: [phone]      For Pharmacy Admin Tracking Only    Program: [address] [place] [address]  Time Spent (min): 5

## 2023-10-17 ENCOUNTER — CARE COORDINATION (OUTPATIENT)
Dept: OTHER | Facility: CLINIC | Age: 58
End: 2023-10-17

## 2023-10-17 NOTE — CARE COORDINATION
Ambulatory Care Coordination Note    Associate Care Manager [facility]) attempted final outreach to patient for follow up call regarding BWWD check in. HIPAA compliant message left requesting a return phone call at patient convenience. Per chart review, pharmacy outreach indicated patient needs to have urine microalbumin level documented to remain in the [address] program. (By 12/31/23)  Lost to follow up letter sent to patient via My Chart.         Future Appointments   Date Time Provider [address]   1/9/2024  2:45 PM Rosalee Mitchell MD CDE BS AMB   2/29/2024  3:00 PM  BALJINDER HARRIS RCHICB [address]   2/29/2024  3:15 PM Coretta Alex MD [address] BS AMB

## 2023-10-24 ENCOUNTER — CARE COORDINATION (OUTPATIENT)
Dept: OTHER | Facility: CLINIC | Age: 58
End: 2023-10-24

## 2023-10-24 NOTE — CARE COORDINATION
No further outreach scheduled with this ACM, ACM will sign off care team at this time. Episode of Care resolved. Patient  has this ACM's contact information if future needs arise. Lost to follow up letter sent via My Chart on 10/17/23. No return phone calls received.

## 2023-11-23 RX ORDER — ROSUVASTATIN CALCIUM 10 MG/1
10 TABLET, COATED ORAL NIGHTLY
Qty: 90 TABLET | Refills: 3 | Status: SHIPPED | OUTPATIENT
Start: 2023-11-23

## 2023-12-15 ENCOUNTER — HOSPITAL ENCOUNTER (OUTPATIENT)
Facility: HOSPITAL | Age: 58
Discharge: HOME | End: 2023-12-15
Payer: COMMERCIAL

## 2023-12-15 DIAGNOSIS — I10 ESSENTIAL HYPERTENSION: ICD-10-CM

## 2023-12-15 DIAGNOSIS — E11.65 TYPE 2 DIABETES MELLITUS WITH HYPERGLYCEMIA, WITHOUT LONG-TERM CURRENT USE OF INSULIN (HCC): ICD-10-CM

## 2023-12-15 LAB
ANION GAP SERPL CALC-SCNC: 9 MMOL/L (ref 5–15)
BUN SERPL-MCNC: 21 MG/DL (ref 6–20)
BUN/CREAT SERPL: 19 (ref 12–20)
CA-I BLD-MCNC: 9.7 MG/DL (ref 8.5–10.1)
CHLORIDE SERPL-SCNC: 103 MMOL/L (ref 97–108)
CO2 SERPL-SCNC: 25 MMOL/L (ref 21–32)
CREAT SERPL-MCNC: 1.12 MG/DL (ref 0.7–1.3)
GLUCOSE SERPL-MCNC: 199 MG/DL (ref 65–100)
POTASSIUM SERPL-SCNC: 4 MMOL/L (ref 3.5–5.1)
SODIUM SERPL-SCNC: 137 MMOL/L (ref 136–145)

## 2023-12-15 PROCEDURE — 82570 ASSAY OF URINE CREATININE: CPT

## 2023-12-15 PROCEDURE — 80048 BASIC METABOLIC PNL TOTAL CA: CPT

## 2023-12-15 PROCEDURE — 83036 HEMOGLOBIN GLYCOSYLATED A1C: CPT

## 2023-12-15 PROCEDURE — 80061 LIPID PANEL: CPT

## 2023-12-15 PROCEDURE — 82043 UR ALBUMIN QUANTITATIVE: CPT

## 2023-12-16 LAB
CHOLEST SERPL-MCNC: 154 MG/DL
CREAT UR-MCNC: 70.3 MG/DL
EST. AVERAGE GLUCOSE BLD GHB EST-MCNC: 217 MG/DL
HBA1C MFR BLD: 9.2 % (ref 4–5.6)
HDLC SERPL-MCNC: 35 MG/DL
HDLC SERPL: 4.4 (ref 0–5)
LDLC SERPL CALC-MCNC: 62.4 MG/DL (ref 0–100)
LIPID PANEL: ABNORMAL
MICROALBUMIN UR-MCNC: 0.62 MG/DL
MICROALBUMIN/CREAT UR-RTO: 9 MGMALB/GCRE (ref 0–30)
TRIGL SERPL-MCNC: 283 MG/DL
VLDLC SERPL CALC-MCNC: 56.6 MG/DL

## 2024-01-09 ENCOUNTER — OFFICE VISIT (OUTPATIENT)
Age: 59
End: 2024-01-09
Payer: COMMERCIAL

## 2024-01-09 VITALS
SYSTOLIC BLOOD PRESSURE: 132 MMHG | TEMPERATURE: 97.8 F | BODY MASS INDEX: 23.28 KG/M2 | DIASTOLIC BLOOD PRESSURE: 78 MMHG | HEART RATE: 96 BPM | WEIGHT: 171.9 LBS | RESPIRATION RATE: 18 BRPM | HEIGHT: 72 IN | OXYGEN SATURATION: 98 %

## 2024-01-09 DIAGNOSIS — I10 ESSENTIAL HYPERTENSION: ICD-10-CM

## 2024-01-09 DIAGNOSIS — E11.42 TYPE 2 DIABETES MELLITUS WITH DIABETIC POLYNEUROPATHY, WITHOUT LONG-TERM CURRENT USE OF INSULIN (HCC): Primary | ICD-10-CM

## 2024-01-09 DIAGNOSIS — E78.2 MIXED HYPERLIPIDEMIA: ICD-10-CM

## 2024-01-09 PROCEDURE — 3078F DIAST BP <80 MM HG: CPT | Performed by: INTERNAL MEDICINE

## 2024-01-09 PROCEDURE — 99215 OFFICE O/P EST HI 40 MIN: CPT | Performed by: INTERNAL MEDICINE

## 2024-01-09 PROCEDURE — 3075F SYST BP GE 130 - 139MM HG: CPT | Performed by: INTERNAL MEDICINE

## 2024-01-09 RX ORDER — BLOOD-GLUCOSE SENSOR
EACH MISCELLANEOUS
Qty: 2 EACH | Refills: 3 | Status: SHIPPED | OUTPATIENT
Start: 2024-01-09

## 2024-01-09 RX ORDER — PIOGLITAZONEHYDROCHLORIDE 30 MG/1
30 TABLET ORAL EVERY MORNING
Qty: 90 TABLET | Refills: 3 | Status: SHIPPED | OUTPATIENT
Start: 2024-01-09

## 2024-01-09 NOTE — PROGRESS NOTES
[facility] DIABETES AND ENDOCRINOLOGY                 Maday Atwood MD            Patient Information   Name : Jarod Ruth 57 y.o.    YOB: 1965           PCP: Estrella Patel MD                Chief Complaint   Patient presents with    Diabetes           History of Present Illness: Jarod Ruth is a 57 y.o. male here  for fu of  Type 2 Diabetes Mellitus which was diagnosed in 2013.   He is on Xigduo, semaglutide  Not checking the blood glucose and hence no log  He was given josé luis 3 sample, since it has been more than 3 months there are no readings  Feels tired, hot flashes, on Lupron injections  No severe hypoglycemia     Seen podiatry -for onychomycosis,        Oncologist is Dr Mehta,         Physical Examination:      General: pleasant, no distress, good eye contact    HEENT: no pallor, no periorbital edema, EOMI    Neck: supple,     Cardiovascular: regular, normal rate, normal S1 and S2,     Respiratory: clear to auscultation bilaterally        Neurological: alert and oriented    Psychiatric: normal mood and affect    Skin: color, texture, turgor normal.       Diabetic foot exam ; March 2023      H/o partial or complete amputation of foot : No   H/o previous foot ulceration : No   H/o pre - ulcerative callus : Yes   H/o peripheral neuropathy and callus : No   H/o poor circulation  : No   Foot deformity :Callus      Data Reviewed:       Lab Results   Component Value Date    GFRAA 104 11/10/2021        Lab Results   Component Value Date    LABA1C 9.2 (H) 12/15/2023    LABA1C 9.7 (H) 09/02/2023    LABA1C 8.6 (H) 03/13/2023         Lab Results   Component Value Date    LDLCALC 62.4 12/15/2023    TRIG 283 (H) 12/15/2023    HDL 35 12/15/2023     12/15/2023    K 4.0 12/15/2023     12/15/2023    CO2 25 12/15/2023    BUN 21 (H) 12/15/2023    CREATININE 1.12 12/15/2023    GFRAA 104 11/10/2021    GLUCOSE 199 (H) 12/15/2023    CALCIUM 9.7 12/15/2023    MALBCR 9 12/15/2023

## 2024-01-09 NOTE — PROGRESS NOTES
Jarod Ruth is a 58 y.o. male here for   Chief Complaint   Patient presents with    Diabetes       1. Have you been to the ER, urgent care clinic since your last visit?  Hospitalized since your last visit? - No    2. Have you seen or consulted any other health care providers outside of the [facility] since your last visit?  Include any pap smears or colon screening.- PCP

## 2024-01-09 NOTE — PATIENT INSTRUCTIONS
Cost of Freestyle Tim  How much does it cost if I have commercial insurance (not including Medicare, , or Medicaid)?  As always, it depends on your insurance company. It often costs no more than what you are paying for test strips.   If your insurance does not cover the freestyle tim, don't worry! You can get around that by a special coupon that reduces the price to 75$ per month.   How do I get the coupon?  Call [phone] and the company will give you a coupon code to give to the pharmacist. This coupon reduces the cash price of the Tim sensors to $75 per month.   What if I have Medicare?  Medicare will pay for Freestyle Tim if you meet coverage criteria. First, you must be on 3 or more daily injections of insulin. Second, you have to monitor blood sugar 4 times a day. You must have documented proof of 4 times a day sugar checks for at least 30 days straight. Third, you must have seen your doctor within 6 months.       How do I share my glucose data with my doctor? (This takes a little bit of work, but you only need to do it once!)  First, download the Freestyle librSummit Carek peter for iphone or android.    Second, within the peter, sign up for an account (you will be prompted to do this the first time you open the peter.)  Please write down the email address, username, and password you use. Many patients forget how to access their account.  On a desktop computer, go to the website [email or web address].  Sign-in to this website using the email address and password you used to set up the [employer] account.    Once logged in to [employer], click the drop-down in the upper right corner and select account settings.   Under account settings, clink the tab that says My Practices.   Enter your doctor's Practice ID which is [email or web address]

## 2024-01-12 ENCOUNTER — TELEPHONE (OUTPATIENT)
Dept: PHARMACY | Facility: CLINIC | Age: 59
End: 2024-01-12

## 2024-01-12 NOTE — TELEPHONE ENCOUNTER
**Patient is [facility]**     2024 Annual Pharmacist Visit    Called patient to schedule 2024 yearly pharmacist appointment to discuss medications for Diabetes Management Program.    No answer. Left VM.     Please call back at [phone], option #3         [facility]  Clinical Pharmacy   Phone: [phone], option #3

## 2024-01-19 NOTE — TELEPHONE ENCOUNTER
Called patient and scheduled appointment for 2/12/24 at 4:00pm.       [facility]  Clinical Pharmacy   Phone: [phone]       For Pharmacy Admin Tracking Only    Program: [employer]  CPA in place:  No  Recommendation Provided To: Patient/Caregiver: 1 via Telephone  Intervention Detail: Scheduled Appointment  Intervention Accepted By: Patient/Caregiver: 1  Gap Closed?: Yes   Time Spent (min): 10

## 2024-01-19 NOTE — TELEPHONE ENCOUNTER
Second attempt made to contact patient to schedule 2024 yearly pharmacist appointment to discuss medications for Diabetes Management Program.    Patient requested to be called back later because working. Will call back later.         [facility]  Clinical Pharmacy   Phone: [phone], option #3         For Pharmacy Admin Tracking Only    Program: [employer]  CPA in place:  No  Gap Closed?: No   Time Spent (min): 10

## 2024-01-25 RX ORDER — AMMONIUM LACTATE 12 G/100G
CREAM TOPICAL
Qty: 280 G | Refills: 3 | Status: SHIPPED | OUTPATIENT
Start: 2024-01-25

## 2024-01-25 NOTE — TELEPHONE ENCOUNTER
Lov:02/14/23  Nov:None    Medication requested:Ammonium Lactate 12%     Please send to pharmacy if appropriate.

## 2024-02-04 RX ORDER — DAPAGLIFLOZIN AND METFORMIN HYDROCHLORIDE 5; 1000 MG/1; MG/1
TABLET, FILM COATED, EXTENDED RELEASE ORAL
Qty: 180 TABLET | Refills: 3 | Status: SHIPPED | OUTPATIENT
Start: 2024-02-04

## 2024-02-12 ENCOUNTER — TELEPHONE (OUTPATIENT)
Dept: PHARMACY | Facility: CLINIC | Age: 59
End: 2024-02-12

## 2024-02-12 NOTE — TELEPHONE ENCOUNTER
Follow up: Endocrinologist for identified gaps or as scheduled below    - Upcoming appointments:   Future Appointments   Date Time Provider Department Center   2/29/2024  3:00 PM H1 BALJINDER HARRIS RCH[facility]   2/29/2024  3:15 PM Roxi Mariee MD MEDONC [facility]   4/15/2024  2:45 PM Maday Atwood MD CDE [facility]     W. Bridger Reyez, PharmD, BCACP  Ambulatory Care Clinical Pharmacist- [facility] Clinical Pharmacy  Department, toll free: [phone]      For Pharmacy Admin Tracking Only    Program: [employer]  CPA in place:  No  Recommendation Provided To: Provider: 1 via Note to Provider and Patient/Caregiver: 3 via Telephone  Intervention Detail: Benefit Assistance and New Rx: 2, reason: Needs Additional Therapy  Intervention Accepted By: Provider: 1 and Patient/Caregiver: 1  Gap Closed?: Yes   Time Spent (min): 60

## 2024-02-13 RX ORDER — ACYCLOVIR 400 MG/1
TABLET ORAL
Qty: 9 EACH | Refills: 3 | Status: SHIPPED | OUTPATIENT
Start: 2024-02-13

## 2024-02-13 NOTE — TELEPHONE ENCOUNTER
Dr. Atwood    Your patient is currently enrolled in the Be Well with Diabetes program. After your patient's recent visit with a [facility] Clinical Pharmacist, the below were identified as opportunities to assist with their diabetes management (if patient is not eligible for below recommendations, please reply with reason/contraindication):   He is interested in trying to get his insurance to cover CGM.  Dexcom G7 is preferred by his plan, but requires a PA.  If you send a new order to [facility], they can initiate the PA request. I have pended an order for your convenience, and will followup with pt.      Thank you,  LABA Reyez, PharmD, BCACP  Ambulatory Care Clinical Pharmacist- [facility] Clinical Pharmacy  Department, toll free: [phone]

## 2024-02-15 ENCOUNTER — NURSE TRIAGE (OUTPATIENT)
Dept: OTHER | Facility: CLINIC | Age: 59
End: 2024-02-15

## 2024-02-15 ENCOUNTER — HOSPITAL ENCOUNTER (EMERGENCY)
Facility: HOSPITAL | Age: 59
Discharge: HOME | End: 2024-02-15
Attending: EMERGENCY MEDICINE

## 2024-02-15 VITALS
DIASTOLIC BLOOD PRESSURE: 83 MMHG | BODY MASS INDEX: 25.14 KG/M2 | SYSTOLIC BLOOD PRESSURE: 125 MMHG | RESPIRATION RATE: 18 BRPM | OXYGEN SATURATION: 98 % | HEART RATE: 92 BPM | TEMPERATURE: 97.9 F | WEIGHT: 185.63 LBS | HEIGHT: 72 IN

## 2024-02-15 DIAGNOSIS — Z23 TETANUS-DIPHTHERIA (TD) VACCINATION: ICD-10-CM

## 2024-02-15 DIAGNOSIS — S61.217A LACERATION OF LEFT LITTLE FINGER WITHOUT FOREIGN BODY WITHOUT DAMAGE TO NAIL, INITIAL ENCOUNTER: Primary | ICD-10-CM

## 2024-02-15 DIAGNOSIS — Z77.21 EXPOSURE TO BLOOD OR BODY FLUID: ICD-10-CM

## 2024-02-15 LAB
COMMENT:: NORMAL
HBV SURFACE AG SER QL: <0.1 INDEX
HBV SURFACE AG SER QL: NEGATIVE
HCV AB SER IA-ACNC: 0.11 INDEX
HCV AB SERPL QL IA: NONREACTIVE
HIV1 P24 AG SERPL QL IA: NONREACTIVE
HIV1+2 AB SERPL QL IA: REACTIVE
SPECIMEN HOLD: NORMAL

## 2024-02-15 PROCEDURE — 12001 RPR S/N/AX/GEN/TRNK 2.5CM/<: CPT

## 2024-02-15 PROCEDURE — 2500000003 HC RX 250 WO HCPCS

## 2024-02-15 PROCEDURE — 6360000002 HC RX W HCPCS

## 2024-02-15 PROCEDURE — 90714 TD VACC NO PRESV 7 YRS+ IM: CPT

## 2024-02-15 PROCEDURE — 99284 EMERGENCY DEPT VISIT MOD MDM: CPT

## 2024-02-15 PROCEDURE — 36415 COLL VENOUS BLD VENIPUNCTURE: CPT

## 2024-02-15 PROCEDURE — 90471 IMMUNIZATION ADMIN: CPT

## 2024-02-15 PROCEDURE — 86702 HIV-2 ANTIBODY: CPT

## 2024-02-15 PROCEDURE — 86701 HIV-1ANTIBODY: CPT

## 2024-02-15 RX ORDER — TETANUS AND DIPHTHERIA TOXOIDS ADSORBED 2; 2 [LF]/.5ML; [LF]/.5ML
0.5 INJECTION INTRAMUSCULAR ONCE
Status: COMPLETED | OUTPATIENT
Start: 2024-02-15 | End: 2024-02-15

## 2024-02-15 RX ORDER — CEPHALEXIN 500 MG/1
500 CAPSULE ORAL 4 TIMES DAILY
Qty: 28 CAPSULE | Refills: 0 | Status: SHIPPED | OUTPATIENT
Start: 2024-02-15 | End: 2024-02-22

## 2024-02-15 RX ORDER — LIDOCAINE HYDROCHLORIDE 10 MG/ML
5 INJECTION, SOLUTION EPIDURAL; INFILTRATION; INTRACAUDAL; PERINEURAL
Status: COMPLETED | OUTPATIENT
Start: 2024-02-15 | End: 2024-02-15

## 2024-02-15 RX ADMIN — TETANUS AND DIPHTHERIA TOXOIDS ADSORBED 0.5 ML: 2; 2 INJECTION INTRAMUSCULAR at 13:22

## 2024-02-15 RX ADMIN — LIDOCAINE HYDROCHLORIDE 5 ML: 10 INJECTION, SOLUTION EPIDURAL; INFILTRATION; INTRACAUDAL; PERINEURAL at 13:23

## 2024-02-15 NOTE — ED NOTES
Discharge paperwork reviewed with pt & questions answered. IV taken out. Pt pinky wrapped with dressing & coban. Pt walked off unit in no apparent acute distress.

## 2024-02-15 NOTE — ED TRIAGE NOTES
TRIAGE: Pt arrives with c/o laceration to left pinky that occurred when he reports his finger ran across the blade of a lab machine. Pt states there were tissues on the machine with the injury and was sent down by [employer]. Unknown last tetanus dose.     Pt HIV positive.

## 2024-02-15 NOTE — DISCHARGE INSTRUCTIONS
Discussed visit today. Please keep the area dry and clean over the next 2-3 days. Please take the antibiotics as prescribed and return to the ER with any worsening of symptoms. Discussed labs are in process and follow-up with employee health as needed.  Tetanus was updated today.    Return to the ER with any worsening of symptoms.

## 2024-02-15 NOTE — ED PROVIDER NOTES
[facility] EMERGENCY DEP  EMERGENCY DEPARTMENT ENCOUNTER      Pt Name: Jarod Ruth  MRN: [id number]  Birthdate 1965  Date of evaluation: 2/15/2024  Provider: Moses Aggarwal PA-C    CHIEF COMPLAINT       Chief Complaint   Patient presents with    Laceration         HISTORY OF PRESENT ILLNESS    Patient is a 58-year-old male with history of HIV currently on antiviral medication, prostate cancer, and type 2 diabetes who presents to the emergency room with reports of a laceration to the left pinky that occurred prior to arrival.  Patient reports that he works in [employer] and his finger ran across the blade of a lab machine.  Patient reports there were human tissues on the machine with the injury and was sent down by [employer].  Patient is unsure when his last tetanus was updated.  Patient reports that he put ice on his finger to preserve that tissue on his finger.  Patient denies any chest pain, shortness of breath, abdominal pain, nausea or vomiting, diarrhea constipation, headache, dizziness, lightheadedness, fever, or chills.  Patient denies alcohol use, denies smoking/vaping or illicit drug use.          Nursing Notes were reviewed.    REVIEW OF SYSTEMS       Review of Systems   Skin:  Positive for wound.   All other systems reviewed and are negative.        PAST MEDICAL HISTORY     Past Medical History:   Diagnosis Date    HIV (human immunodeficiency virus infection) (HCC)     Prostate cancer (HCC)     Type II diabetes mellitus, uncontrolled          SURGICAL HISTORY       Past Surgical History:   Procedure Laterality Date    CATARACT REMOVAL      COLONOSCOPY N/A 7/20/2023    COLONOSCOPY DIAGNOSTIC performed by Tony Gould MD at [facility] ENDOSCOPY    PROSTATECTOMY           CURRENT MEDICATIONS       Discharge Medication List as of 2/15/2024  2:25 PM        CONTINUE these medications which have NOT CHANGED    Details   !! Continuous Blood Gluc Sensor (DEXCOM G7 SENSOR) MISC Use to check blood sugar 4/ day . Change

## 2024-02-15 NOTE — ED NOTES
7099 -received phone call from microbiology lab regarding discharge patient's postexposure profile results.  HIV reactive.  Per chart review patient has history of HIV and is on antiviral therapy.  No need for further action at this time.  DAVON Hartman NP, Nicole, APRN - NP  02/15/24 [phone]

## 2024-02-15 NOTE — TELEPHONE ENCOUNTER
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    Location of injury (body part involved): left pinky finger    Time of injury: 10:00    Last 4 of SSN: [id number]    Location associate referred to for care:  [facility] ER    Subjective: Caller states \"I was using the microtone and my finger slipped and came down on the blade\"     Current Symptoms: tip of finger bleeding    Pain Severity: 6/10; sharp, dull, aching, throbbing, tender; constant    Temperature:  NA    What has been tried: first aid, applied pressure    LMP: NA Pregnant: NA    Recommended disposition: Go to ED Now    Employee injury packet sent to employee with listing of approved providers and locations. Employee aware they must be seen by one of the panel physicians, not their own PCP. Employee verbalizes understanding.    Care advice provided, caller verbalizes understanding; denies any other questions or concerns.    Patient/caller agrees to proceed to [facility] Emergency Department    Reason for Disposition   Bleeding and won't stop after 10 minutes of direct pressure (using correct technique)    Protocols used: Cuts and Lacerations-ADULT-[place]

## 2024-02-19 LAB
HBV SURFACE AG SER QL: <0.1 INDEX
HBV SURFACE AG SER QL: NEGATIVE
HCV AB SER IA-ACNC: 0.11 INDEX
HCV AB SERPL QL IA: NONREACTIVE
HIV 1 & 2 AB SER-IMP: ABNORMAL
HIV 2 AB SERPL QL IA: NON REACTIVE
HIV1 AB SERPL QL IA: REACTIVE
HIV1 P24 AG SERPL QL IA: NONREACTIVE
HIV1+2 AB SERPL QL IA: REACTIVE

## 2024-02-21 ENCOUNTER — TELEPHONE (OUTPATIENT)
Age: 59
End: 2024-02-21

## 2024-02-21 NOTE — TELEPHONE ENCOUNTER
1513     Patient HIPAA Verified x2     Left patient a voicemail just reminding him up upcoming appointment and to please get labs done prior to this appointment if he has any question or concerns to feel free to give our office a call back

## 2024-02-26 ENCOUNTER — TELEPHONE (OUTPATIENT)
Dept: PHARMACY | Facility: CLINIC | Age: 59
End: 2024-02-26

## 2024-02-26 NOTE — TELEPHONE ENCOUNTER
[facility] CLINICAL PHARMACY REVIEW - BE WELL WITH DIABETES  =============================================  Jarod Ruth is a 58 y.o. male enrolled in the [facility] Be Well with Diabetes program.      - Dexcom G7 PA denied by insurance  - Pt has not tested blood sugar since we last spoke 2 weeks ago. Again encouraged him to take a fasting reading at least 2-3 times per week  - Had not yet started Pioglitazone. Pt was worried about hypoglycemia.  Discussed in great detail and pt agreed to start today.  Will check back in a couple weeks and will hopefully have some readings to go off of as well.    ALBA Reyez, PharmD, BCACP  Ambulatory Care Clinical Pharmacist- [facility] Clinical Pharmacy  Department, toll free: [phone]      For Pharmacy Admin Tracking Only    Program: [employer]  CPA in place:  No  Gap Closed?: Yes   Time Spent (min): 15

## 2024-02-28 NOTE — PROGRESS NOTES
[facility]  Medical Oncology   [phone]    Hematology / Oncology Follow-up    Reason for Visit:   Jarod Ruth is a 58 y.o. male who is seen for follow-up of prostate cancer s/p brachytherapy with biochemical recurrence in 2018 (PSA 17), currently on ADT.      Oncology History:   2009: diagnosed with low-risk prostate cancer.  PSA 5.  Prostate biopsy with prostate adenocarcinoma with Mateus 3+3 = 6, [id number].  Treated with brachytherapy.  2014 PSA 0.52  1/2017 PSA increased to 3.59.  Prostate biopsy was repeated and was negative for malignancy  8/2018 PSA 17.  Started on intermittent GnRH injection for biochemical recurrence.  Lupron started 11/2018  10/18/18 prostate MRI: no evidence of local recurrence  10/11/2018: small focus in R upper posterior femoral diaphysis, which is probably a metastasis.  Suggest radiograph of femur.    10/22/18 CT femur: not felt to be a metastasis  10/11/2018 CT CAP and bone scan: no evidence of metastatic disease  3/2/20 PSA <0.1  5/16/21 PSA <0.1  3/22/22 PSA <0.1, testosterone 428  Switched to Eligard at some point.  Per notes, C3 Eligard 3/25/22  3/24/23: Established with me. PSA <0.1, testosterone 412.  Eligard resumed.    9/2023: PSA 0, testosterone 12.     Assessment:     Mr. Ruth is 58 y.o. man with a history of low-risk cT1cN0 prostate cancer (Mateus 3+3=6, PSA 5, diagnosed 2009) s/p brachytherapy who developed biochemical recurrence in 2018 (PSA 17) and was started on intermittent GnRH (Lupron 11/2018 x 5 cycles, then Eligard).          #) Low-risk prostate cancer, [place] 3+3=6, cT1cN0, s/p brachytherapy, with non-metastatic biochemical recurrence 2018, on intermittent ADT  Started on intermittent ADT 11/2018 for biochemical recurrence. Intermittent ADT selected by prior oncologist to reduce severity of side effects, especially metabolic syndrome in setting of diabetes as well as sexual side effects.  ADT (Eligard) resumed in ~2022, although PSA

## 2024-02-29 ENCOUNTER — CLINICAL DOCUMENTATION (OUTPATIENT)
Age: 59
End: 2024-02-29

## 2024-02-29 ENCOUNTER — HOSPITAL ENCOUNTER (OUTPATIENT)
Facility: HOSPITAL | Age: 59
Setting detail: INFUSION SERIES
Discharge: HOME | End: 2024-02-29
Payer: COMMERCIAL

## 2024-02-29 ENCOUNTER — APPOINTMENT (OUTPATIENT)
Dept: INFUSION THERAPY | Age: 59
End: 2024-02-29

## 2024-02-29 ENCOUNTER — OFFICE VISIT (OUTPATIENT)
Age: 59
End: 2024-02-29
Payer: COMMERCIAL

## 2024-02-29 VITALS
DIASTOLIC BLOOD PRESSURE: 73 MMHG | SYSTOLIC BLOOD PRESSURE: 106 MMHG | HEART RATE: 102 BPM | WEIGHT: 180 LBS | OXYGEN SATURATION: 98 % | BODY MASS INDEX: 24.41 KG/M2 | RESPIRATION RATE: 18 BRPM

## 2024-02-29 DIAGNOSIS — E11.65 TYPE 2 DIABETES MELLITUS WITH HYPERGLYCEMIA, WITHOUT LONG-TERM CURRENT USE OF INSULIN (HCC): ICD-10-CM

## 2024-02-29 DIAGNOSIS — C61 PRIMARY MALIGNANT NEOPLASM OF PROSTATE (HCC): Primary | ICD-10-CM

## 2024-02-29 DIAGNOSIS — Z21 ASYMPTOMATIC HIV INFECTION, WITH NO HISTORY OF HIV-RELATED ILLNESS (HCC): ICD-10-CM

## 2024-02-29 LAB
ALBUMIN SERPL-MCNC: 4.3 G/DL (ref 3.5–5)
ALBUMIN/GLOB SERPL: 1.1 (ref 1.1–2.2)
ALP SERPL-CCNC: 88 U/L (ref 45–117)
ALT SERPL-CCNC: 23 U/L (ref 12–78)
ANION GAP SERPL CALC-SCNC: 5 MMOL/L (ref 5–15)
AST SERPL-CCNC: 15 U/L (ref 15–37)
BASOPHILS # BLD: 0 K/UL (ref 0–0.1)
BASOPHILS NFR BLD: 0 % (ref 0–1)
BILIRUB SERPL-MCNC: 0.5 MG/DL (ref 0.2–1)
BUN SERPL-MCNC: 23 MG/DL (ref 6–20)
BUN/CREAT SERPL: 17 (ref 12–20)
CALCIUM SERPL-MCNC: 10.3 MG/DL (ref 8.5–10.1)
CHLORIDE SERPL-SCNC: 104 MMOL/L (ref 97–108)
CO2 SERPL-SCNC: 28 MMOL/L (ref 21–32)
CREAT SERPL-MCNC: 1.33 MG/DL (ref 0.7–1.3)
DIFFERENTIAL METHOD BLD: ABNORMAL
EOSINOPHIL # BLD: 0.1 K/UL (ref 0–0.4)
EOSINOPHIL NFR BLD: 2 % (ref 0–7)
ERYTHROCYTE [DISTWIDTH] IN BLOOD BY AUTOMATED COUNT: 13.7 % (ref 11.5–14.5)
GLOBULIN SER CALC-MCNC: 3.9 G/DL (ref 2–4)
GLUCOSE SERPL-MCNC: 187 MG/DL (ref 65–100)
HCT VFR BLD AUTO: 43.2 % (ref 36.6–50.3)
HGB BLD-MCNC: 14.1 G/DL (ref 12.1–17)
IMM GRANULOCYTES # BLD AUTO: 0 K/UL (ref 0–0.04)
IMM GRANULOCYTES NFR BLD AUTO: 0 % (ref 0–0.5)
LYMPHOCYTES # BLD: 2.2 K/UL (ref 0.8–3.5)
LYMPHOCYTES NFR BLD: 53 % (ref 12–49)
MCH RBC QN AUTO: 28 PG (ref 26–34)
MCHC RBC AUTO-ENTMCNC: 32.6 G/DL (ref 30–36.5)
MCV RBC AUTO: 85.9 FL (ref 80–99)
MONOCYTES # BLD: 0.4 K/UL (ref 0–1)
MONOCYTES NFR BLD: 10 % (ref 5–13)
NEUTS SEG # BLD: 1.4 K/UL (ref 1.8–8)
NEUTS SEG NFR BLD: 35 % (ref 32–75)
NRBC # BLD: 0 K/UL (ref 0–0.01)
NRBC BLD-RTO: 0 PER 100 WBC
PLATELET # BLD AUTO: 159 K/UL (ref 150–400)
PMV BLD AUTO: 10.3 FL (ref 8.9–12.9)
POTASSIUM SERPL-SCNC: 4 MMOL/L (ref 3.5–5.1)
PROT SERPL-MCNC: 8.2 G/DL (ref 6.4–8.2)
PSA SERPL-MCNC: 0 NG/ML (ref 0.01–4)
RBC # BLD AUTO: 5.03 M/UL (ref 4.1–5.7)
SODIUM SERPL-SCNC: 137 MMOL/L (ref 136–145)
WBC # BLD AUTO: 4.1 K/UL (ref 4.1–11.1)

## 2024-02-29 PROCEDURE — 99213 OFFICE O/P EST LOW 20 MIN: CPT | Performed by: INTERNAL MEDICINE

## 2024-02-29 PROCEDURE — 84153 ASSAY OF PSA TOTAL: CPT

## 2024-02-29 PROCEDURE — 84403 ASSAY OF TOTAL TESTOSTERONE: CPT

## 2024-02-29 PROCEDURE — 85025 COMPLETE CBC W/AUTO DIFF WBC: CPT

## 2024-02-29 PROCEDURE — 84402 ASSAY OF FREE TESTOSTERONE: CPT

## 2024-02-29 PROCEDURE — 3074F SYST BP LT 130 MM HG: CPT | Performed by: INTERNAL MEDICINE

## 2024-02-29 PROCEDURE — 36415 COLL VENOUS BLD VENIPUNCTURE: CPT

## 2024-02-29 PROCEDURE — 80053 COMPREHEN METABOLIC PANEL: CPT

## 2024-02-29 PROCEDURE — 3078F DIAST BP <80 MM HG: CPT | Performed by: INTERNAL MEDICINE

## 2024-02-29 RX ORDER — DIPHENHYDRAMINE HYDROCHLORIDE 50 MG/ML
50 INJECTION INTRAMUSCULAR; INTRAVENOUS
OUTPATIENT
Start: 2024-08-11

## 2024-02-29 RX ORDER — EPINEPHRINE 1 MG/ML
0.3 INJECTION, SOLUTION INTRAMUSCULAR; SUBCUTANEOUS PRN
OUTPATIENT
Start: 2024-08-11

## 2024-02-29 RX ORDER — SODIUM CHLORIDE 9 MG/ML
INJECTION, SOLUTION INTRAVENOUS CONTINUOUS
OUTPATIENT
Start: 2024-08-11

## 2024-02-29 RX ORDER — ACETAMINOPHEN 325 MG/1
650 TABLET ORAL
OUTPATIENT
Start: 2024-08-11

## 2024-02-29 RX ORDER — ONDANSETRON 2 MG/ML
8 INJECTION INTRAMUSCULAR; INTRAVENOUS
OUTPATIENT
Start: 2024-08-11

## 2024-02-29 RX ORDER — ALBUTEROL SULFATE 90 UG/1
4 AEROSOL, METERED RESPIRATORY (INHALATION) PRN
OUTPATIENT
Start: 2024-08-11

## 2024-02-29 NOTE — PROGRESS NOTES
[facility]  Oncology Social Work  Encounter     Patient Name:  Jarod Ruth     Medical History: prostate cancer, low-risk    Advance Directives: none on file; conversation deferred; LNOK as needed      Narrative: Met with patient at office visit to discuss concerns about cost of medications. He states he had an $80k bill but that all except ~$300 was covered. He might apply for Rx financial assistance, though it could be that his cost was related to meeting deductible/max out-of-pocket. Provided him with med list needed to complete his application for med assistance program. He knows he can bring it to this office to be faxed in if he prefers.     Barriers to Care:  concerns about cost of treatment    Plan:   Patient to apply for med assistance program  Ongoing psychosocial support as desired     Referral/Handouts:        Insurance/Entitlements referral  Financial/Medication assistance referral     SW remains available for further assistance.     Signed by: Alicia Quezada LMSW

## 2024-02-29 NOTE — PROGRESS NOTES
Jarod Ruth is a 58 y.o. male     Chief Complaint   Patient presents with    Follow-up     prostate cancer s/p brachytherapy with biochemical recurrence in 2018 (PSA 17), currently on ADT.       1. Have you been to the ER, urgent care clinic since your last visit?  Hospitalized since your last visit?No    2. Have you seen or consulted any other health care providers outside of the [facility] since your last visit?  Include any pap smears or colon screening. Yes , [facility]

## 2024-02-29 NOTE — PROGRESS NOTES
[facility] - Chemotherapy Progress Note    [facility]. Assessment completed. No new concerns voiced. Labs drawn peripherally and sent for processing. D/c home ambulatory in no distress.  Please see labs once resulted

## 2024-03-02 LAB
TESTOST FREE SERPL-MCNC: ABNORMAL PG/ML
TESTOST SERPL-MCNC: 13 NG/DL (ref 264–916)

## 2024-03-04 ENCOUNTER — TELEPHONE (OUTPATIENT)
Age: 59
End: 2024-03-04

## 2024-03-04 NOTE — TELEPHONE ENCOUNTER
----- Message from Roxi Mariee MD sent at 3/4/2024  7:39 AM EST -----  Please call patient and let him know that his PSA was undetectable. We will proceed with out plan to repeat PSA in 3 months and hold on his Lupron injections for now.

## 2024-03-05 LAB
TESTOST FREE SERPL-MCNC: 3.1 PG/ML (ref 6.6–18.1)
TESTOST SERPL-MCNC: 13 NG/DL (ref 264–916)

## 2024-03-05 NOTE — TELEPHONE ENCOUNTER
Left VM and let patient know that his PSA was undetectable. We will proceed with out plan to repeat PSA in 3 months and hold on his Lupron injections for now. Advised patient to call back with any questions/concerns.

## 2024-03-11 ENCOUNTER — TELEPHONE (OUTPATIENT)
Dept: PHARMACY | Facility: CLINIC | Age: 59
End: 2024-03-11

## 2024-03-11 NOTE — TELEPHONE ENCOUNTER
[facility] CLINICAL PHARMACY REVIEW - BE WELL WITH DIABETES  =============================================  Jarod Ruth is a 58 y.o. male enrolled in the [facility] Be Well with Diabetes program.    As of last week pt was supposed to:  Start Pioglitazone  Start checking BG more frequently    Plan:  Attempted to reach pt to discuss above.  LVM requesting call back to [phone] option 3. Mychart also sent. Will sign off and check back in 1-2 months    ALBA Reyez, PharmD, BCACP  Ambulatory Care Clinical Pharmacist- [facility] Clinical Pharmacy  Department, toll free: [phone]      For Pharmacy Admin Tracking Only    Program: [employer]  Time Spent (min): 10

## 2024-03-17 RX ORDER — BLOOD SUGAR DIAGNOSTIC
STRIP MISCELLANEOUS
Qty: 200 STRIP | Refills: 3 | Status: SHIPPED | OUTPATIENT
Start: 2024-03-17

## 2024-03-17 RX ORDER — LANCETS 28 GAUGE
EACH MISCELLANEOUS
Qty: 200 EACH | Refills: 3 | Status: SHIPPED | OUTPATIENT
Start: 2024-03-17

## 2024-04-03 ENCOUNTER — NURSE TRIAGE (OUTPATIENT)
Dept: OTHER | Facility: CLINIC | Age: 59
End: 2024-04-03

## 2024-04-03 NOTE — TELEPHONE ENCOUNTER
[place] [facility]    Subjective: Caller states \"gotten mites\"     Current Symptoms:   Believes he may have gotten some mites from someones house, affecting scalp, facial hair area, eye lash area and nostrils.   No rash, can feel things crawling on him in the areas that have hair.     Onset: 2 days ago; sudden      What has been tried: nix, ointment.       Recommended disposition: See PCP within 24 Hours    Care advice provided, patient verbalizes understanding; denies any other questions or concerns; instructed to call back for any new or worsening symptoms.    Patient/caller agrees to follow-up with PCP     Attention Provider:  Thank you for allowing me to participate in the care of your patient.  The patient was connected to triage in response to symptoms provided.   Please do not respond through this encounter as the response is not directed to a shared pool.      Reason for Disposition   [1] MODERATE-SEVERE widespread itching (i.e., interferes with sleep, normal activities or school) AND [2] not improved after 24 hours of itching Care Advice    Protocols used: Itching - Widespread-ADULT-

## 2024-04-04 ENCOUNTER — PATIENT MESSAGE (OUTPATIENT)
Dept: PHARMACY | Facility: CLINIC | Age: 59
End: 2024-04-04

## 2024-04-04 ENCOUNTER — CLINICAL DOCUMENTATION (OUTPATIENT)
Dept: PHARMACY | Facility: CLINIC | Age: 59
End: 2024-04-04

## 2024-04-04 NOTE — PROGRESS NOTES
1st Quarterly Reminder sent to patient for the DM Program - See Mychart message or Letter for more information.    Susanna Sylvester CphT  [facility]  Clinical Pharmacy   Department, toll free: [phone] Option #3      For Pharmacy Admin Tracking Only    Program: [employer]  CPA in place:  No  Gap Closed?: Yes   Time Spent (min): 5

## 2024-04-12 NOTE — TELEPHONE ENCOUNTER
[employer] message not read by patient.  Letter mailed to patient with the information from the [employer] message.    Jason Cristobal [facility]  Clinical Pharmacy   Department, toll free: [phone] Option #3      For Pharmacy Admin Tracking Only    Program: [employer]  CPA in place:  No  Gap Closed?: Yes   Time Spent (min): 5

## 2024-04-16 ENCOUNTER — TELEPHONE (OUTPATIENT)
Age: 59
End: 2024-04-16

## 2024-04-22 DIAGNOSIS — E11.65 TYPE 2 DIABETES MELLITUS WITH HYPERGLYCEMIA, WITHOUT LONG-TERM CURRENT USE OF INSULIN (HCC): Primary | ICD-10-CM

## 2024-04-22 RX ORDER — SEMAGLUTIDE 1.34 MG/ML
INJECTION, SOLUTION SUBCUTANEOUS
Qty: 9 ML | Refills: 3 | Status: SHIPPED | OUTPATIENT
Start: 2024-04-22

## 2024-05-22 ENCOUNTER — TELEPHONE (OUTPATIENT)
Age: 59
End: 2024-05-22

## 2024-05-22 NOTE — TELEPHONE ENCOUNTER
3434     Called patient and left him a voicemail just reminding him or upcoming appointment and to please get labs done piror to appointment .     If he has any question or concerns to please give our office a call back

## 2024-05-23 ENCOUNTER — TELEPHONE (OUTPATIENT)
Dept: PHARMACY | Facility: CLINIC | Age: 59
End: 2024-05-23

## 2024-05-23 NOTE — TELEPHONE ENCOUNTER
[facility] HEALTH CLINICAL PHARMACY REVIEW - BE WELL WITH DIABETES: HIGH A1C  =============================================  Jarod Ruth is a 59 y.o. male enrolled in the [facility] Be Well with Diabetes program.    Identified care gap(s): A1c > 8%    DM Program Prescriptions:  Current Outpatient Medications   Medication Instructions    ammonium lactate (AMLACTIN) 12 % cream APPLY TOPICALLY AND RUB WELL INTO AFFECTED AREA 2 TIMES A DAY    aspirin 81 mg, Oral, DAILY    bictegravir-emtricitab-tenofovir alafenamide (BIKTARVY) -25 MG TABS per tablet 1 tablet, Oral, DAILY    Continuous Blood Gluc Sensor (DEXCOM G7 SENSOR) MISC Use to check blood sugar 4/ day . Change sensor every 10 days    Continuous Blood Gluc Sensor (FREESTYLE FAIZAN 3 SENSOR) MISC Use to check BG 4 times daily. Dx code: E11.65    latanoprost (XALATAN) 0.005 % ophthalmic solution No dose, route, or frequency recorded.    leuprolide (LUPRON) 45 mg, IntraMUSCular, EVERY 6 MONTHS    OZEMPIC, 1 MG/DOSE, 4 MG/3ML SOPN INJECT 1MG UNDER THE SKIN ONCE WEEKLY    pioglitazone (ACTOS) 30 mg, Oral, EVERY MORNING    PRODIGY NO CODING BLOOD GLUC strip USE TO TEST BLOOD SUGAR 2 TIMES A DAY AS NEEDED    Prodigy Twist Top Lancets 28G MISC USE TO TEST BLOOD SUGAR 2 TIMES A DAY AS NEEDED    rosuvastatin (CRESTOR) 10 mg, Oral, NIGHTLY    XIGDUO XR 5-1000 MG TB24 TAKE ONE TABLET BY MOUTH TWICE A DAY, STOP FARXIGA AND METFORMIN        Allergies:  Allergies   Allergen Reactions    Erythromycin Myalgia    Sulfa Antibiotics Nausea Only        Labs:    Lab Results   Component Value Date/Time    LABA1C 9.2 12/15/2023 03:26 PM    LABA1C 9.7 09/02/2023 11:58 AM    LABA1C 8.6 03/13/2023 07:26 AM    SUVI2CIXT 7.3 11/02/2022 12:00 AM    CREATININE 1.33 02/29/2024 04:37 PM    LABGLOM >60 02/29/2024 04:37 PM    LABGLOM 90 09/02/2023 11:58 AM    MALBCR 9 12/15/2023 03:26 PM      Hemoglobin A1C, POC   Date Value Ref Range Status   11/10/2021 7.8 % Final       Care

## 2024-05-29 DIAGNOSIS — C61 PRIMARY MALIGNANT NEOPLASM OF PROSTATE (HCC): ICD-10-CM

## 2024-06-10 ENCOUNTER — NURSE TRIAGE (OUTPATIENT)
Dept: OTHER | Facility: CLINIC | Age: 59
End: 2024-06-10

## 2024-06-10 NOTE — TELEPHONE ENCOUNTER
Location of patient: [place]     Subjective: Caller states \"It just not getting better - I am very active - I swim a lot\"     Current Symptoms: mild swelling in both legs and both feet  urination seem to make the swelling go down, no chest pain,  no shortness of breath no pain but feels mild discomfort    Previous symptoms bilateral leg and foot swelling (up to the calf), has felt his heart race    Onset: 1 month ago; waxing and waning    Associated Symptoms: NA    Pain Severity: 1/10; aching; intermittent    Temperature: denies       What has been tried: elevation at night when sleeping    LMP: NA Pregnant: NA    Recommended disposition: See PCP within 3 Days    Care advice provided, patient verbalizes understanding; denies any other questions or concerns; instructed to call back for any new or worsening symptoms.    Patient/caller agrees to follow-up with PCP     Attention Provider:  Thank you for allowing me to participate in the care of your patient.  The patient was connected to triage in response to symptoms provided.   Please do not respond through this encounter as the response is not directed to a shared pool.      Reason for Disposition   MILD swelling of both ankles (i.e., pedal edema) AND new-onset or worsening    Protocols used: Leg Swelling and Edema-ADULT-[place]

## 2024-06-11 ENCOUNTER — TELEPHONE (OUTPATIENT)
Age: 59
End: 2024-06-11

## 2024-06-11 NOTE — TELEPHONE ENCOUNTER
1129     Left patient a voicemail just reminding him of upcoming a appointment and to please get labs done prior to appointment no questions or concerns at this time.

## 2024-06-17 ENCOUNTER — NURSE TRIAGE (OUTPATIENT)
Dept: OTHER | Facility: CLINIC | Age: 59
End: 2024-06-17

## 2024-06-17 ENCOUNTER — HOSPITAL ENCOUNTER (EMERGENCY)
Facility: HOSPITAL | Age: 59
Discharge: HOME | End: 2024-06-17
Attending: EMERGENCY MEDICINE
Payer: COMMERCIAL

## 2024-06-17 ENCOUNTER — APPOINTMENT (OUTPATIENT)
Facility: HOSPITAL | Age: 59
End: 2024-06-17
Payer: COMMERCIAL

## 2024-06-17 ENCOUNTER — TELEPHONE (OUTPATIENT)
Age: 59
End: 2024-06-17

## 2024-06-17 VITALS
DIASTOLIC BLOOD PRESSURE: 74 MMHG | OXYGEN SATURATION: 97 % | RESPIRATION RATE: 17 BRPM | TEMPERATURE: 97.8 F | SYSTOLIC BLOOD PRESSURE: 124 MMHG | HEART RATE: 76 BPM | WEIGHT: 188.49 LBS | BODY MASS INDEX: 25.56 KG/M2

## 2024-06-17 DIAGNOSIS — R07.9 CHEST PAIN, UNSPECIFIED TYPE: Primary | ICD-10-CM

## 2024-06-17 DIAGNOSIS — E11.69 TYPE 2 DIABETES MELLITUS WITH OTHER SPECIFIED COMPLICATION, UNSPECIFIED WHETHER LONG TERM INSULIN USE (HCC): ICD-10-CM

## 2024-06-17 LAB
ALBUMIN SERPL-MCNC: 3.5 G/DL (ref 3.5–5)
ALBUMIN/GLOB SERPL: 1 (ref 1.1–2.2)
ALP SERPL-CCNC: 73 U/L (ref 45–117)
ALT SERPL-CCNC: 20 U/L (ref 12–78)
ANION GAP SERPL CALC-SCNC: 4 MMOL/L (ref 5–15)
APPEARANCE UR: CLEAR
AST SERPL-CCNC: 21 U/L (ref 15–37)
BACTERIA URNS QL MICRO: NEGATIVE /HPF
BASOPHILS # BLD: 0 K/UL (ref 0–0.1)
BASOPHILS NFR BLD: 0 % (ref 0–1)
BILIRUB SERPL-MCNC: 0.5 MG/DL (ref 0.2–1)
BILIRUB UR QL: NEGATIVE
BUN SERPL-MCNC: 19 MG/DL (ref 6–20)
BUN/CREAT SERPL: 20 (ref 12–20)
CALCIUM SERPL-MCNC: 9.3 MG/DL (ref 8.5–10.1)
CHLORIDE SERPL-SCNC: 106 MMOL/L (ref 97–108)
CO2 SERPL-SCNC: 26 MMOL/L (ref 21–32)
COLOR UR: ABNORMAL
COMMENT:: NORMAL
CREAT SERPL-MCNC: 0.97 MG/DL (ref 0.7–1.3)
D DIMER PPP FEU-MCNC: 0.21 MG/L FEU (ref 0–0.65)
DIFFERENTIAL METHOD BLD: ABNORMAL
EKG ATRIAL RATE: 89 BPM
EKG DIAGNOSIS: NORMAL
EKG P AXIS: 34 DEGREES
EKG P-R INTERVAL: 150 MS
EKG Q-T INTERVAL: 368 MS
EKG QRS DURATION: 92 MS
EKG QTC CALCULATION (BAZETT): 447 MS
EKG R AXIS: -17 DEGREES
EKG T AXIS: -16 DEGREES
EKG VENTRICULAR RATE: 89 BPM
EOSINOPHIL # BLD: 0 K/UL (ref 0–0.4)
EOSINOPHIL NFR BLD: 1 % (ref 0–7)
EPITH CASTS URNS QL MICRO: ABNORMAL /LPF
ERYTHROCYTE [DISTWIDTH] IN BLOOD BY AUTOMATED COUNT: 15.2 % (ref 11.5–14.5)
GLOBULIN SER CALC-MCNC: 3.5 G/DL (ref 2–4)
GLUCOSE SERPL-MCNC: 131 MG/DL (ref 65–100)
GLUCOSE UR STRIP.AUTO-MCNC: >1000 MG/DL
HCT VFR BLD AUTO: 38.6 % (ref 36.6–50.3)
HGB BLD-MCNC: 12.6 G/DL (ref 12.1–17)
HGB UR QL STRIP: NEGATIVE
HYALINE CASTS URNS QL MICRO: ABNORMAL /LPF (ref 0–5)
IMM GRANULOCYTES # BLD AUTO: 0 K/UL (ref 0–0.04)
IMM GRANULOCYTES NFR BLD AUTO: 0 % (ref 0–0.5)
KETONES UR QL STRIP.AUTO: 15 MG/DL
LEUKOCYTE ESTERASE UR QL STRIP.AUTO: NEGATIVE
LIPASE SERPL-CCNC: 51 U/L (ref 13–75)
LYMPHOCYTES # BLD: 1.7 K/UL (ref 0.8–3.5)
LYMPHOCYTES NFR BLD: 39 % (ref 12–49)
MCH RBC QN AUTO: 28.6 PG (ref 26–34)
MCHC RBC AUTO-ENTMCNC: 32.6 G/DL (ref 30–36.5)
MCV RBC AUTO: 87.7 FL (ref 80–99)
MONOCYTES # BLD: 0.3 K/UL (ref 0–1)
MONOCYTES NFR BLD: 8 % (ref 5–13)
NEUTS SEG # BLD: 2.3 K/UL (ref 1.8–8)
NEUTS SEG NFR BLD: 52 % (ref 32–75)
NITRITE UR QL STRIP.AUTO: NEGATIVE
NRBC # BLD: 0 K/UL (ref 0–0.01)
NRBC BLD-RTO: 0 PER 100 WBC
NT PRO BNP: 16 PG/ML
PH UR STRIP: 5 (ref 5–8)
PLATELET # BLD AUTO: 141 K/UL (ref 150–400)
PMV BLD AUTO: 10.6 FL (ref 8.9–12.9)
POTASSIUM SERPL-SCNC: 4.1 MMOL/L (ref 3.5–5.1)
PROT SERPL-MCNC: 7 G/DL (ref 6.4–8.2)
PROT UR STRIP-MCNC: NEGATIVE MG/DL
RBC # BLD AUTO: 4.4 M/UL (ref 4.1–5.7)
RBC #/AREA URNS HPF: ABNORMAL /HPF (ref 0–5)
SODIUM SERPL-SCNC: 136 MMOL/L (ref 136–145)
SP GR UR REFRACTOMETRY: 1.02 (ref 1–1.03)
SPECIMEN HOLD: NORMAL
TROPONIN I SERPL HS-MCNC: <4 NG/L (ref 0–76)
UROBILINOGEN UR QL STRIP.AUTO: 0.2 EU/DL (ref 0.2–1)
WBC # BLD AUTO: 4.3 K/UL (ref 4.1–11.1)
WBC URNS QL MICRO: ABNORMAL /HPF (ref 0–4)

## 2024-06-17 PROCEDURE — C9113 INJ PANTOPRAZOLE SODIUM, VIA: HCPCS | Performed by: EMERGENCY MEDICINE

## 2024-06-17 PROCEDURE — 93005 ELECTROCARDIOGRAM TRACING: CPT | Performed by: EMERGENCY MEDICINE

## 2024-06-17 PROCEDURE — 36415 COLL VENOUS BLD VENIPUNCTURE: CPT

## 2024-06-17 PROCEDURE — 80053 COMPREHEN METABOLIC PANEL: CPT

## 2024-06-17 PROCEDURE — 71046 X-RAY EXAM CHEST 2 VIEWS: CPT

## 2024-06-17 PROCEDURE — 6370000000 HC RX 637 (ALT 250 FOR IP): Performed by: EMERGENCY MEDICINE

## 2024-06-17 PROCEDURE — 83880 ASSAY OF NATRIURETIC PEPTIDE: CPT

## 2024-06-17 PROCEDURE — 96374 THER/PROPH/DIAG INJ IV PUSH: CPT

## 2024-06-17 PROCEDURE — 99285 EMERGENCY DEPT VISIT HI MDM: CPT

## 2024-06-17 PROCEDURE — 84484 ASSAY OF TROPONIN QUANT: CPT

## 2024-06-17 PROCEDURE — 83690 ASSAY OF LIPASE: CPT

## 2024-06-17 PROCEDURE — 6360000002 HC RX W HCPCS: Performed by: EMERGENCY MEDICINE

## 2024-06-17 PROCEDURE — 85025 COMPLETE CBC W/AUTO DIFF WBC: CPT

## 2024-06-17 PROCEDURE — 2580000003 HC RX 258: Performed by: EMERGENCY MEDICINE

## 2024-06-17 PROCEDURE — 85379 FIBRIN DEGRADATION QUANT: CPT

## 2024-06-17 PROCEDURE — 81001 URINALYSIS AUTO W/SCOPE: CPT

## 2024-06-17 RX ORDER — ASPIRIN 81 MG/1
81 TABLET, CHEWABLE ORAL
Status: COMPLETED | OUTPATIENT
Start: 2024-06-17 | End: 2024-06-17

## 2024-06-17 RX ORDER — 0.9 % SODIUM CHLORIDE 0.9 %
1000 INTRAVENOUS SOLUTION INTRAVENOUS ONCE
Status: COMPLETED | OUTPATIENT
Start: 2024-06-17 | End: 2024-06-17

## 2024-06-17 RX ADMIN — SODIUM CHLORIDE 40 MG: 9 INJECTION INTRAMUSCULAR; INTRAVENOUS; SUBCUTANEOUS at 10:55

## 2024-06-17 RX ADMIN — SODIUM CHLORIDE 1000 ML: 9 INJECTION, SOLUTION INTRAVENOUS at 10:56

## 2024-06-17 RX ADMIN — ASPIRIN 81 MG CHEWABLE TABLET 81 MG: 81 TABLET CHEWABLE at 10:55

## 2024-06-17 ASSESSMENT — PAIN DESCRIPTION - DESCRIPTORS: DESCRIPTORS: OTHER (COMMENT)

## 2024-06-17 ASSESSMENT — LIFESTYLE VARIABLES
HOW MANY STANDARD DRINKS CONTAINING ALCOHOL DO YOU HAVE ON A TYPICAL DAY: PATIENT DOES NOT DRINK
HOW OFTEN DO YOU HAVE A DRINK CONTAINING ALCOHOL: NEVER

## 2024-06-17 ASSESSMENT — ENCOUNTER SYMPTOMS
TROUBLE SWALLOWING: 0
ABDOMINAL PAIN: 0
COUGH: 0
SHORTNESS OF BREATH: 0
BACK PAIN: 0

## 2024-06-17 ASSESSMENT — PAIN SCALES - GENERAL: PAINLEVEL_OUTOF10: 5

## 2024-06-17 ASSESSMENT — PAIN - FUNCTIONAL ASSESSMENT
PAIN_FUNCTIONAL_ASSESSMENT: 0-10
PAIN_FUNCTIONAL_ASSESSMENT: PREVENTS OR INTERFERES SOME ACTIVE ACTIVITIES AND ADLS

## 2024-06-17 ASSESSMENT — PAIN DESCRIPTION - ORIENTATION: ORIENTATION: OTHER (COMMENT)

## 2024-06-17 ASSESSMENT — PAIN DESCRIPTION - LOCATION: LOCATION: CHEST

## 2024-06-17 NOTE — TELEPHONE ENCOUNTER
[place]    Subjective: Caller states \"I am expericing some indigestion in my chest and I am sweating. I am not sure if it is a heart attack or what but I need to be checked.  \"     Current Symptoms: chest indigestion, sweating, back pain (unclear if this started around the same time)    Onset:  chest discomfort started a couple hours ago, sweating just started    Associated Symptoms: NA    Pain Severity: 5-6/10; constant, \"steady\"    Temperature: denies     What has been tried: nothing    LMP: NA Pregnant: NA    Recommended disposition: Call  Now    Care advice provided, patient verbalizes understanding; denies any other questions or concerns; instructed to call back for any new or worsening symptoms.    Patient/caller agrees to proceed to nearest Emergency Department. He is at work and will walk down to ED.     Attention Provider:  Thank you for allowing me to participate in the care of your patient.  The patient was connected to triage in response to symptoms provided.   Please do not respond through this encounter as the response is not directed to a shared pool.    Reason for Disposition   [1] Chest pain lasts > 5 minutes AND [2] age > 44    Protocols used: Chest Pain-ADULT-

## 2024-06-17 NOTE — ED PROVIDER NOTES
[facility] EMERGENCY DEP  EMERGENCY DEPARTMENT ENCOUNTER      Pt Name: Jarod Ruth  MRN: [id number]  Birthdate 1965  Date of evaluation: 6/17/2024  Provider: DAVON Burger NP    CHIEF COMPLAINT       Chief Complaint   Patient presents with    Chest Pain         HISTORY OF PRESENT ILLNESS   (Location/Symptom, Timing/Onset, Context/Setting, Quality, Duration, Modifying Factors, Severity)  Note limiting factors.   HPI patient is a 59-year-old male with past medical history significant for onychomycosis, diabetic neuropathy, type 2 diabetes, hypertension, HIV, dyslipidemia prostate cancer in bilateral cataracts who presents to the ED with chest pain that started this morning after having a light breakfast and coffee while at work. Denies fever, cold symptoms, headache, neck pain, visual changes, focal weakness or rash. Denies any difficulty breathing, difficulty swallowing, SOB or abdominal pain. Denies any nausea, vomiting or diarrhea.  He has mild bilateral lower leg swelling; appears symmetrical. Pt. reports that he has not had any pain medications today prior to arrival.  He drove himself here.  Old charts reviewed.        Review of External Medical Records:     Nursing Notes were reviewed.    REVIEW OF SYSTEMS    (2-9 systems for level 4, 10 or more for level 5)     Review of Systems   Constitutional:  Negative for activity change, appetite change, fever and unexpected weight change.   HENT:  Negative for congestion and trouble swallowing.    Eyes:  Negative for visual disturbance.   Respiratory:  Negative for cough and shortness of breath.    Cardiovascular:  Positive for chest pain. Negative for palpitations.   Gastrointestinal:  Negative for abdominal pain.   Genitourinary:  Negative for dysuria and flank pain.   Musculoskeletal:  Negative for back pain and gait problem.   Neurological:  Negative for dizziness, light-headedness and headaches.   All other systems reviewed and are negative.      Except

## 2024-06-17 NOTE — ED TRIAGE NOTES
Pt c/o chest pain , denies cough or fever, +bilateral leg swelling , denies n/v , radiates to back , started when sitting

## 2024-06-17 NOTE — TELEPHONE ENCOUNTER
Phoned pt to r/s appt from 6/18 to 7/22/2024 at 2 pm. Pt stated he needed to r/s anyways because he was on vacation. Reminded pt to complete labs 3-5 days prior to appt. Pt expressed understanding.

## 2024-06-18 ENCOUNTER — CARE COORDINATION (OUTPATIENT)
Dept: OTHER | Facility: CLINIC | Age: 59
End: 2024-06-18

## 2024-06-18 NOTE — CARE COORDINATION
Ambulatory Care Coordination Note     2024 1:48 PM     Patient Current Location:  [place]     This patient was received as a referral from Ambulatory Care Manager .    ACM contacted the patient by telephone. Verified name and  with patient as identifiers. Provided introduction to self, and explanation of the ACM role.   Patient accepted care management services at this time.          ACM: Cintia Ndiaye RN     Challenges to be reviewed by the provider   Additional needs identified to be addressed with provider No  none               Method of communication with provider: none.    Care Summary Note: Patient states chest pain has resolved.  Works out in the gym, plans to get back into the gym more.  Discussed BWWD program.  Will assist with meeting goals for this program, as well.  Patient acknowledges that he needs to check his glucose.  Currently not on insulin.  Using ozempic for glucose management.  Last A1C was 9.2 in December.  States he follows up with endocrinology and his PCP for diabetes management.      Made appt for patient with Dr. Johnny Wu, cardio for 24 @ 1:30 pm, advised patient of this appt.  Need to arrive 15 min early, bring insurance card and medications.  Confirmed this provider is Tier 1 under [employer] portal.      Offered patient enrollment in the Remote Patient Monitoring (RPM) program for in-home monitoring: Patient is not eligible for RPM program because: insurance coverage.     Assessments Completed:   Diabetes Assessment    Medic Alert ID: No  Meal Planning: Plate Method, Avoidance of concentrated sweets, Carb counting, Calorie counting   How often do you test your blood sugar?: No Testing (Comment: states he is not testing; encouraged testing)   Do you have barriers with adherence to non-pharmacologic self-management interventions? (Nutrition/Exercise/Self-Monitoring): No   Have you ever had to go to the ED for symptoms of low blood sugar?: No                 Medications

## 2024-06-24 ENCOUNTER — TELEPHONE (OUTPATIENT)
Dept: PHARMACY | Facility: CLINIC | Age: 59
End: 2024-06-24

## 2024-06-24 NOTE — TELEPHONE ENCOUNTER
[facility] HEALTH CLINICAL PHARMACY REVIEW - BE WELL WITH DIABETES: HIGH A1C  =============================================  Jarod Ruth is a 59 y.o. male enrolled in the [facility] Be Well with Diabetes program.    Identified care gap(s): A1c > 8%    DM Program Prescriptions:  Current Outpatient Medications   Medication Instructions    ammonium lactate (AMLACTIN) 12 % cream APPLY TOPICALLY AND RUB WELL INTO AFFECTED AREA 2 TIMES A DAY    aspirin 81 mg, Oral, DAILY    bictegravir-emtricitab-tenofovir alafenamide (BIKTARVY) -25 MG TABS per tablet 1 tablet, Oral, DAILY    Continuous Blood Gluc Sensor (DEXCOM G7 SENSOR) MISC Use to check blood sugar 4/ day . Change sensor every 10 days    Continuous Blood Gluc Sensor (FREESTYLE FAIZAN 3 SENSOR) MISC Use to check BG 4 times daily. Dx code: E11.65    latanoprost (XALATAN) 0.005 % ophthalmic solution No dose, route, or frequency recorded.    leuprolide (LUPRON) 45 mg, IntraMUSCular, EVERY 6 MONTHS    OZEMPIC, 1 MG/DOSE, 4 MG/3ML SOPN INJECT 1MG UNDER THE SKIN ONCE WEEKLY    pioglitazone (ACTOS) 30 mg, Oral, EVERY MORNING    PRODIGY NO CODING BLOOD GLUC strip USE TO TEST BLOOD SUGAR 2 TIMES A DAY AS NEEDED    Prodigy Twist Top Lancets 28G MISC USE TO TEST BLOOD SUGAR 2 TIMES A DAY AS NEEDED    rosuvastatin (CRESTOR) 10 mg, Oral, NIGHTLY    XIGDUO XR 5-1000 MG TB24 TAKE ONE TABLET BY MOUTH TWICE A DAY, STOP FARXIGA AND METFORMIN        Allergies:  Allergies   Allergen Reactions    Erythromycin Myalgia    Sulfa Antibiotics Nausea Only        Labs:    Lab Results   Component Value Date/Time    LABA1C 9.2 12/15/2023 03:26 PM    LABA1C 9.7 09/02/2023 11:58 AM    LABA1C 8.6 03/13/2023 07:26 AM    UEII0GMEL 7.3 11/02/2022 12:00 AM    CREATININE 0.97 06/17/2024 10:49 AM    LABGLOM 89 06/17/2024 10:49 AM    LABGLOM >60 02/29/2024 04:37 PM    LABGLOM 90 09/02/2023 11:58 AM    MALBCR 9 12/15/2023 03:26 PM        Care Team:   Physician (endocrinologist):

## 2024-07-02 ENCOUNTER — CARE COORDINATION (OUTPATIENT)
Dept: OTHER | Facility: CLINIC | Age: 59
End: 2024-07-02

## 2024-07-02 NOTE — CARE COORDINATION
Ambulatory Care Coordination Note     2024 4:07 PM     Patient Current Location:  [facility] contacted the patient by telephone. Verified name and  with patient as identifiers.         ACM: Cintia Ndiaye RN     Challenges to be reviewed by the provider   Additional needs identified to be addressed with provider No  none               Method of communication with provider: none.    Care Summary Note: Patient had to re-schedule his f/up to have his A1C.   Patient identified snacks, like pretzels.  Likes apples, encouraged patient to pair that with a protein.  Eats only a little bit of sweets.  Encouraged patient to drink more water - with flavor enhancers.    Gets physical activity, swims and weight lifting.    Patient appreciative for my call.    Offered patient enrollment in the Remote Patient Monitoring (RPM) program for in-home monitoring: Patient is not eligible for RPM program because: insurance coverage.     Assessments Completed:   No changes since last call    Medications Reviewed:   Completed during a previous call     Advance Care Planning:   Reviewed and current     Care Planning:   Not completed during this call    PCP/Specialist follow up:   Future Appointments         Provider Specialty Dept Phone    2024 2:00 PM Niya Campos, APRN - CNP Oncology [phone]            Follow Up:   Plan for next ACM outreach in approximately 1 month to complete:  - A1C results? .   patient  is agreeable to this plan.

## 2024-07-10 ENCOUNTER — TELEPHONE (OUTPATIENT)
Age: 59
End: 2024-07-10

## 2024-07-10 NOTE — TELEPHONE ENCOUNTER
Left vm requesting a call back in regards to next appt scheduled 7/22/2024; inquiring if appt could be moved to morning as well as advising to have labs completed.

## 2024-07-16 LAB
PSA SERPL-MCNC: <0.1 NG/ML (ref 0–4)
TESTOST FREE SERPL-MCNC: 9.4 PG/ML (ref 6.6–18.1)
TESTOST SERPL-MCNC: 299 NG/DL (ref 264–916)

## 2024-07-17 ENCOUNTER — HOSPITAL ENCOUNTER (EMERGENCY)
Facility: HOSPITAL | Age: 59
Discharge: HOME | End: 2024-07-17
Attending: EMERGENCY MEDICINE

## 2024-07-17 ENCOUNTER — NURSE TRIAGE (OUTPATIENT)
Dept: OTHER | Facility: CLINIC | Age: 59
End: 2024-07-17

## 2024-07-17 VITALS
HEIGHT: 72 IN | BODY MASS INDEX: 25.74 KG/M2 | WEIGHT: 190.04 LBS | TEMPERATURE: 97.7 F | SYSTOLIC BLOOD PRESSURE: 145 MMHG | RESPIRATION RATE: 20 BRPM | DIASTOLIC BLOOD PRESSURE: 84 MMHG | OXYGEN SATURATION: 97 % | HEART RATE: 95 BPM

## 2024-07-17 DIAGNOSIS — Z77.098 CHEMICAL EXPOSURE OF EYE: Primary | ICD-10-CM

## 2024-07-17 PROCEDURE — 99283 EMERGENCY DEPT VISIT LOW MDM: CPT

## 2024-07-17 PROCEDURE — 6370000000 HC RX 637 (ALT 250 FOR IP)

## 2024-07-17 RX ORDER — TETRACAINE HYDROCHLORIDE 5 MG/ML
1 SOLUTION OPHTHALMIC ONCE
Status: COMPLETED | OUTPATIENT
Start: 2024-07-17 | End: 2024-07-17

## 2024-07-17 RX ADMIN — TETRACAINE HYDROCHLORIDE 1 DROP: 5 SOLUTION OPHTHALMIC at 14:12

## 2024-07-17 RX ADMIN — FLUORESCEIN SODIUM 1 MG: 1 STRIP OPHTHALMIC at 14:12

## 2024-07-17 ASSESSMENT — PAIN - FUNCTIONAL ASSESSMENT
PAIN_FUNCTIONAL_ASSESSMENT: 0-10
PAIN_FUNCTIONAL_ASSESSMENT: ACTIVITIES ARE NOT PREVENTED

## 2024-07-17 ASSESSMENT — PAIN DESCRIPTION - LOCATION: LOCATION: EYE

## 2024-07-17 ASSESSMENT — PAIN DESCRIPTION - ONSET: ONSET: ON-GOING

## 2024-07-17 ASSESSMENT — ENCOUNTER SYMPTOMS: EYE PAIN: 1

## 2024-07-17 ASSESSMENT — PAIN DESCRIPTION - DESCRIPTORS: DESCRIPTORS: BURNING

## 2024-07-17 ASSESSMENT — PAIN DESCRIPTION - ORIENTATION: ORIENTATION: RIGHT

## 2024-07-17 ASSESSMENT — TONOMETRY
OS_IOP_MMHG: 18
OD_IOP_MMHG: 24

## 2024-07-17 ASSESSMENT — PAIN DESCRIPTION - FREQUENCY: FREQUENCY: CONTINUOUS

## 2024-07-17 ASSESSMENT — PAIN DESCRIPTION - PAIN TYPE: TYPE: ACUTE PAIN

## 2024-07-17 ASSESSMENT — PAIN SCALES - GENERAL: PAINLEVEL_OUTOF10: 5

## 2024-07-17 NOTE — TELEPHONE ENCOUNTER
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     Subjective: Caller states \"A chemical splashed on my face and then got in my right eye.\"     Current Symptoms: Immunocal, acid based chemical used for decalcifying bones. Did irrigate eye for 7 minutes with eye wash. Eyelid upper and lower edema. No blurry vision. Sclera white.    Pain Severity: 5/10; irritating; constant    Temperature: n/a     What has been tried: irrigation of eye    LMP: NA Pregnant: NA    Recommended disposition: Go to ED Now    Employee injury packet sent to employee with listing of approved providers and locations. Employee aware they must be seen by one of the panel physicians, not their own PCP. Employee verbalizes understanding.      Care advice provided, caller verbalizes understanding; denies any other questions or concerns.    Patient/caller agrees to proceed to [facility]      Reason for Disposition   Acid or alkali was the chemical (Exceptions: mild household bleach or ammonia.)    Protocols used: Eye - Chemical In-ADULT-[place]

## 2024-07-17 NOTE — ED TRIAGE NOTES
Patient was at work yesterday and got a chemical Immunocal in the right cheek. Patient was wearing goggles at the time. Patient flushed out eye for about 7 minutes. Today woke up with swelling under the right eye and flushed eye again. Sensitivity to upper and lower lids to the right eye.

## 2024-07-17 NOTE — ED PROVIDER NOTES
preliminarily interpreted by the emergency physician with the below findings:        Interpretation per the Radiologist below, if available at the time of this note:    No orders to display        LABS:  Labs Reviewed - No data to display    All other labs were within normal range or not returned as of this dictation.    EMERGENCY DEPARTMENT COURSE and DIFFERENTIAL DIAGNOSIS/MDM:   Vitals:    Vitals:    07/17/24 1312   BP: (!) 145/84   Pulse: 95   Resp: 20   Temp: 97.7 °F (36.5 °C)   TempSrc: Oral   SpO2: 97%   Weight: 86.2 kg (190 lb 0.6 oz)   Height: 1.829 m (6')           Medical Decision Making  This is an 59-year-old male who presents to the emergency department subacutely following a possible acid exposure to the right eye.  On exam pupils are equal and reactive to light.  No erythema or drainage noted to the right eye.  No swelling surrounding the eye.  Patient reports normal vision.  No elevation in eye pressure.  Woods lamp exam performed without increased fluorescein uptake.  pH of the eye was measured at 7 using pH paper.  Overall reassuring exam and workup.  Discussed eye washes as needed and lubricating eyedrops for symptoms.  Patient will follow-up closely with his ophthalmologist.  Patient stable at time of discharge home.    Discussed my clinical impression(s), any labs and/or radiology results with the patient. I answered any questions and addressed any concerns. Discussed the importance of following up with their primary care physician and/or specialist(s). Discussed signs or symptoms that would warrant return back to the ER for further evaluation. The patient is agreeable with discharge.      Amount and/or Complexity of Data Reviewed  Discussion of management or test interpretation with external provider(s): Presentation, management, and disposition were discussed with the attending physician, Dr. Cordero, who is in agreement with plan of care.      Risk  Prescription drug

## 2024-07-17 NOTE — DISCHARGE INSTRUCTIONS
You are seen today after a chemical exposure.  Your workup was reassuring and showed normal pH of your eye and no signs of corneal injury or ulcer.  Follow-up closely with your ophthalmologist to ensure your symptoms resolve.  You can use lubricating eyedrops as needed for pain.  Return to the emergency department for any new or worsening symptoms.

## 2024-07-19 ENCOUNTER — PATIENT MESSAGE (OUTPATIENT)
Dept: PHARMACY | Facility: CLINIC | Age: 59
End: 2024-07-19

## 2024-07-19 ENCOUNTER — TELEPHONE (OUTPATIENT)
Dept: PHARMACY | Facility: CLINIC | Age: 59
End: 2024-07-19

## 2024-07-19 NOTE — PROGRESS NOTES
[facility]  Medical Oncology   [phone]    Hematology / Oncology Follow-up    Reason for Visit:   Jarod Ruth is a 59 y.o. male who is seen for follow-up of prostate cancer s/p brachytherapy with biochemical recurrence in 2018 (PSA 17), currently on ADT.      Oncology History:   2009: diagnosed with low-risk prostate cancer.  PSA 5.  Prostate biopsy with prostate adenocarcinoma with [place] 3+3 = 6, [id number].  Treated with brachytherapy.  2014 PSA 0.52  1/2017 PSA increased to 3.59.  Prostate biopsy was repeated and was negative for malignancy  8/2018 PSA 17.  Started on intermittent GnRH injection for biochemical recurrence.  Lupron started 11/2018  10/18/18 prostate MRI: no evidence of local recurrence  10/11/2018: small focus in R upper posterior femoral diaphysis, which is probably a metastasis.  Suggest radiograph of femur.    10/22/18 CT femur: not felt to be a metastasis  10/11/2018 CT CAP and bone scan: no evidence of metastatic disease  3/2/20 PSA <0.1  5/16/21 PSA <0.1  3/22/22 PSA <0.1, testosterone 428  Switched to Eligard at some point.  Per notes, C3 Eligard 3/25/22  3/24/23: Established with me. PSA <0.1, testosterone 412.  Eligard resumed.    9/2023: PSA 0, testosterone 12.     Assessment:     Mr. Ruth is 59 y.o. man with a history of low-risk cT1cN0 prostate cancer ([place]ate[place] 3+3=6, PSA 5, diagnosed 2009) s/p brachytherapy who developed biochemical recurrence in 2018 (PSA 17) and was started on intermittent GnRH (Lupron 11/2018 x 5 cycles, then Eligard).          #) Low-risk prostate cancer, [place] 3+3=6, cT1cN0, s/p brachytherapy, with non-metastatic biochemical recurrence 2018, on intermittent ADT  Started on intermittent ADT 11/2018 for biochemical recurrence. Intermittent ADT selected by prior oncologist to reduce severity of side effects, especially metabolic syndrome in setting of diabetes as well as sexual side effects.  ADT (Eligard) resumed in ~2022, although PSA

## 2024-07-19 NOTE — TELEPHONE ENCOUNTER
Called patient with outstanding requirements for the Be Well With Diabetes Program.  Left a message for patient advising them of their outstanding requirements for the Be Well With Diabetes Program.     [facility] Cristobal [facility]  Clinical Pharmacy   Department, toll free: [phone] Option #3    For Pharmacy Admin Tracking Only    Program: [employer]  CPA in place:  No  Gap Closed?: No   Time Spent (min): 5

## 2024-07-22 ENCOUNTER — OFFICE VISIT (OUTPATIENT)
Age: 59
End: 2024-07-22
Payer: COMMERCIAL

## 2024-07-22 VITALS
DIASTOLIC BLOOD PRESSURE: 79 MMHG | OXYGEN SATURATION: 97 % | WEIGHT: 186 LBS | SYSTOLIC BLOOD PRESSURE: 118 MMHG | BODY MASS INDEX: 25.23 KG/M2 | RESPIRATION RATE: 16 BRPM | HEART RATE: 95 BPM | TEMPERATURE: 98 F

## 2024-07-22 DIAGNOSIS — C61 PRIMARY MALIGNANT NEOPLASM OF PROSTATE (HCC): Primary | ICD-10-CM

## 2024-07-22 PROCEDURE — 3074F SYST BP LT 130 MM HG: CPT

## 2024-07-22 PROCEDURE — 3078F DIAST BP <80 MM HG: CPT

## 2024-07-22 PROCEDURE — 99213 OFFICE O/P EST LOW 20 MIN: CPT

## 2024-07-22 NOTE — PROGRESS NOTES
[facility]  Medical Oncology   [phone]    Hematology / Oncology Follow-up    Reason for Visit:   Jarod Ruth is a 59 y.o. male who is seen for follow-up of prostate cancer s/p brachytherapy with biochemical recurrence in 2018 (PSA 17), currently on ADT holiday.      Oncology History:   2009: diagnosed with low-risk prostate cancer.  PSA 5.  Prostate biopsy with prostate adenocarcinoma with [place] 3+3 = 6, [id number].  Treated with brachytherapy.  2014 PSA 0.52  1/2017 PSA increased to 3.59.  Prostate biopsy was repeated and was negative for malignancy  8/2018 PSA 17.  Started on intermittent GnRH injection for biochemical recurrence.  Lupron started 11/2018  10/18/18 prostate MRI: no evidence of local recurrence  10/11/2018: small focus in R upper posterior femoral diaphysis, which is probably a metastasis.  Suggest radiograph of femur.    10/22/18 CT femur: not felt to be a metastasis  10/11/2018 CT CAP and bone scan: no evidence of metastatic disease  3/2/20 PSA <0.1  5/16/21 PSA <0.1  3/22/22 PSA <0.1, testosterone 428  Switched to Eligard at some point.  Per notes, C3 Eligard 3/25/22  3/24/23: Established with me. PSA <0.1, testosterone 412.  Eligard resumed.    9/2023: PSA 0, testosterone 12.   2/29/24: ADT Holiday initiated   7/15/24: PSA 0.1, testosterone 9.4    Assessment:     Mr. Ruth is 59 y.o. man with a history of low-risk cT1cN0 prostate cancer ([place] 3+3=6, PSA 5, diagnosed 2009) s/p brachytherapy who developed biochemical recurrence in 2018 (PSA 17) and was started on intermittent GnRH (Lupron 11/2018 x 5 cycles, then Eligard).          #) Low-risk prostate cancer, [place] 3+3=6, cT1cN0, s/p brachytherapy, with non-metastatic biochemical recurrence 2018, on intermittent ADT  Started on intermittent ADT 11/2018 for biochemical recurrence. Intermittent ADT selected by prior oncologist to reduce severity of side effects, especially metabolic syndrome in setting of diabetes as

## 2024-07-22 NOTE — PROGRESS NOTES
Jarod Ruth is a 59 y.o. male  Chief Complaint   Patient presents with    Follow-up     prostate cancer s/p brachytherapy with biochemical recurrence in 2018 (PSA 17), currently on ADT     1. Have you been to the ER, urgent care clinic since your last visit?  Hospitalized since your last visit?No    2. Have you seen or consulted any other health care providers outside of the [facility] since your last visit?  Include any pap smears or colon screening. No

## 2024-07-30 ENCOUNTER — CARE COORDINATION (OUTPATIENT)
Dept: OTHER | Facility: CLINIC | Age: 59
End: 2024-07-30

## 2024-07-30 NOTE — CARE COORDINATION
Ambulatory Care Coordination Note     7/30/2024 4:11 PM     Patient outreach attempt by this ACM today to perform care management follow up . ACM was unable to reach the patient by telephone today; left voice message requesting a return phone call to this ACM.     ACM: Cintia Ndaiye RN     Care Summary Note: Outreach to follow up on BWWD program needs.    PCP/Specialist follow up:   Future Appointments         Provider Specialty Dept Phone    10/22/2024 8:40 AM Roxi Mariee MD Oncology [phone]            Follow Up:   Plan for next ACM outreach in approximately 3 weeks to complete:  - goal progression.

## 2024-08-05 NOTE — TELEPHONE ENCOUNTER
[employer] message not read by patient.  Letter mailed to patient with the information from the [employer] message.    [facility]  Clinical Pharmacy   Department, toll free: [phone] Option #3    For Pharmacy Admin Tracking Only    Program: [email or web address]  CPA in place:  No  Gap Closed?: No   Time Spent (min): 5

## 2024-08-20 ENCOUNTER — CARE COORDINATION (OUTPATIENT)
Dept: OTHER | Facility: CLINIC | Age: 59
End: 2024-08-20

## 2024-08-20 NOTE — CARE COORDINATION
Ambulatory Care Coordination Note     2024 4:34 PM     Patient Current Location:  Home: [address] [place] [id number]     ACM contacted the patient by telephone. Verified name and  with patient as identifiers.         ACM: Cintia Ndiaye RN     Challenges to be reviewed by the provider   Additional needs identified to be addressed with provider No  none               Method of communication with provider: none.    Care Summary Note: Patient states he is doing well.  He missed his ozempic dose this weekend and plans to take it today.  States he had to change his endocrinology appt, now scheduled for 24 @ 2:45 pm.  No needs.  Provided support and encouragement.      Offered patient enrollment in the Remote Patient Monitoring (RPM) program for in-home monitoring: Patient is not eligible for RPM program because: insurance coverage.     Assessments Completed:   No changes since last call    Medications Reviewed:   Completed during a previous call     Advance Care Planning:   Reviewed and current     Care Planning:    Goals Addressed                   This Visit's Progress     Self Monitoring   On track     Self-Monitored Blood Glucose - I will check my blood sugar Other: encouraged patient to begin checking glucose    States he is taking ozempic for glucose management.  Endo visit scheduled for 24.    Barriers: lack of motivation  Plan for overcoming my barriers: acknowledged need to check blood sugar  Confidence: 7/10  Anticipated Goal Completion Date: 24                 PCP/Specialist follow up:   Future Appointments         Provider Specialty Dept Phone    2024 2:45 PM Maday Atwood MD Endocrinology [phone]    10/22/2024 8:40 AM Roxi Mariee MD Oncology [phone]            Follow Up:   Plan for next ACM outreach in approximately 1 month to complete:  - A1C? .   Patient  is agreeable to this plan.

## 2024-09-10 ENCOUNTER — NURSE TRIAGE (OUTPATIENT)
Dept: OTHER | Facility: CLINIC | Age: 59
End: 2024-09-10

## 2024-09-13 ENCOUNTER — OFFICE VISIT (OUTPATIENT)
Age: 59
End: 2024-09-13
Payer: COMMERCIAL

## 2024-09-13 VITALS
OXYGEN SATURATION: 97 % | DIASTOLIC BLOOD PRESSURE: 75 MMHG | BODY MASS INDEX: 25.49 KG/M2 | TEMPERATURE: 98.2 F | HEIGHT: 72 IN | WEIGHT: 188.2 LBS | HEART RATE: 90 BPM | SYSTOLIC BLOOD PRESSURE: 122 MMHG

## 2024-09-13 DIAGNOSIS — E78.2 MIXED HYPERLIPIDEMIA: ICD-10-CM

## 2024-09-13 DIAGNOSIS — E11.65 TYPE 2 DIABETES MELLITUS WITH HYPERGLYCEMIA, WITHOUT LONG-TERM CURRENT USE OF INSULIN (HCC): Primary | ICD-10-CM

## 2024-09-13 DIAGNOSIS — I10 ESSENTIAL HYPERTENSION: ICD-10-CM

## 2024-09-13 LAB — HBA1C MFR BLD: 8.2 %

## 2024-09-13 PROCEDURE — 99215 OFFICE O/P EST HI 40 MIN: CPT | Performed by: INTERNAL MEDICINE

## 2024-09-13 PROCEDURE — 83036 HEMOGLOBIN GLYCOSYLATED A1C: CPT | Performed by: INTERNAL MEDICINE

## 2024-09-13 PROCEDURE — 3078F DIAST BP <80 MM HG: CPT | Performed by: INTERNAL MEDICINE

## 2024-09-13 PROCEDURE — 3074F SYST BP LT 130 MM HG: CPT | Performed by: INTERNAL MEDICINE

## 2024-09-13 PROCEDURE — G2211 COMPLEX E/M VISIT ADD ON: HCPCS | Performed by: INTERNAL MEDICINE

## 2024-09-16 ENCOUNTER — TELEPHONE (OUTPATIENT)
Dept: PHARMACY | Facility: CLINIC | Age: 59
End: 2024-09-16

## 2024-09-16 DIAGNOSIS — E11.65 TYPE 2 DIABETES MELLITUS WITH HYPERGLYCEMIA, WITHOUT LONG-TERM CURRENT USE OF INSULIN (HCC): Primary | ICD-10-CM

## 2024-09-16 RX ORDER — GLIPIZIDE 10 MG/1
TABLET ORAL
Qty: 90 TABLET | Refills: 3 | Status: SHIPPED | OUTPATIENT
Start: 2024-09-16

## 2024-09-17 ENCOUNTER — CARE COORDINATION (OUTPATIENT)
Dept: OTHER | Facility: CLINIC | Age: 59
End: 2024-09-17

## 2024-09-27 ENCOUNTER — IMMUNIZATION (OUTPATIENT)
Age: 59
End: 2024-09-27

## 2024-10-01 ENCOUNTER — CARE COORDINATION (OUTPATIENT)
Dept: OTHER | Facility: CLINIC | Age: 59
End: 2024-10-01

## 2024-10-01 NOTE — CARE COORDINATION
Ambulatory Care Coordination Note     10/1/2024 4:01 PM     Patient outreach attempt by this ACM today to perform care management follow up . ACM was unable to reach the patient by telephone today; left voice message requesting a return phone call to this ACM.  [employer] message sent requesting patient to contact this ACM.     ACM: Cintia Ndiaye RN     Care Summary Note: Per chart review, patient received message from pharmacy that he has completed his Be Well w/ Diabetes requirements.      PCP/Specialist follow up:   Future Appointments         Provider Specialty Dept Phone    10/22/2024 8:40 AM Roxi Mariee MD Oncology [phone]    1/10/2025 2:45 PM [facility] Endocrinology [phone]    1/17/2025 2:45 PM Maday Atwood MD Endocrinology [phone]            Follow Up:   Plan for next ACM outreach in approximately 1 week to complete:  Resolve .

## 2024-10-08 ENCOUNTER — CARE COORDINATION (OUTPATIENT)
Dept: OTHER | Facility: CLINIC | Age: 59
End: 2024-10-08

## 2024-10-08 NOTE — CARE COORDINATION
Ambulatory Care Coordination Note     10/8/2024 2:30 PM     patient outreach attempt by this AC today to perform care management follow up . [facility] was unable to reach the patient by telephone today; left voice message requesting a return phone call to this ACM.     Patient closed (Lost to follow up) from the High Risk Care Management program on 10/8/2024.  Patient  has not returned this AC calls after last two calls, per chart review, [facility] pharmacist has sent my chart message stating he has met the program requirements for 2024 .  Care management goals have been completed. No further Ambulatory Care Manager follow up scheduled.

## 2024-10-15 NOTE — TELEPHONE ENCOUNTER
----- Message from Vilma Inman sent at 3/18/2021  9:32 AM EDT -----  Regarding: Dr Kizzy Singh first and last name: Bellevue Teodoros with [address]      Reason for call: Bellevuemanuel Barajas would like to discuss the drug to drug interaction between pts medication \"Biktarvy\" and Metformin      Callback required yes/no and why: yes, to discuss the drug to drug interaction between pts medication \"Biktarvy\" and Metformin      Best contact number(s): [phone] opt [phone]      Details to clarify the request: Bellevue Teodoros would like to discuss the drug to drug interaction between pts medication \"Biktarvy\" and Metformin because the \"Biktarvy\" will increase the effectiveness of the Metformin which will lower pts blood sugar.  Mio Jenn would like to know how to move forward with this information, either decreasing the Metformin or pt checking his blood sugar levels more often      Vilma Inman [employer] Counseling:  I discussed with the patient the risks of mycophenolate mofetil including but not limited to infection/immunosuppression, GI upset, hypokalemia, hypercholesterolemia, bone marrow suppression, lymphoproliferative disorders, malignancy, GI ulceration/bleed/perforation, colitis, interstitial lung disease, kidney failure, progressive multifocal leukoencephalopathy, and birth defects.  The patient understands that monitoring is required including a baseline creatinine and regular CBC testing. In addition, patient must alert us immediately if symptoms of infection or other concerning signs are noted.

## 2024-10-21 NOTE — PROGRESS NOTES
[facility] [place]  Medical Oncology   [phone]    Hematology / Oncology Follow-up    Reason for Visit:   Jarod Ruth is a 59 y.o. male who is seen for follow-up of prostate cancer s/p brachytherapy with biochemical recurrence in 2018 (PSA 17), currently on ADT holiday.      Oncology History:   2009: diagnosed with low-risk prostate cancer.  PSA 5.  Prostate biopsy with prostate adenocarcinoma with Cottonwood 3+3 = 6, [id number].  Treated with brachytherapy.  2014 PSA 0.52  1/2017 PSA increased to 3.59.  Prostate biopsy was repeated and was negative for malignancy  8/2018 PSA 17.  Started on intermittent GnRH injection for biochemical recurrence.  Lupron started 11/2018  10/18/18 prostate MRI: no evidence of local recurrence  10/11/2018: small focus in R upper posterior femoral diaphysis, which is probably a metastasis.  Suggest radiograph of femur.    10/22/18 CT femur: not felt to be a metastasis  10/11/2018 CT CAP and bone scan: no evidence of metastatic disease  3/2/20 PSA <0.1  5/16/21 PSA <0.1  3/22/22 PSA <0.1, testosterone 428  Switched to Eligard at some point.  Per notes, C3 Eligard 3/25/22  3/24/23: Established with me. PSA <0.1, testosterone 412.  Eligard resumed.    9/2023: PSA 0, testosterone 12.   2/29/24: ADT Holiday initiated   7/15/24: PSA <0.1, testosterone 9.4    Assessment:     Mr. Ruth is 59 y.o. man with a history of low-risk cT1cN0 prostate cancer (Mateus 3+3=6, PSA 5, diagnosed 2009) s/p brachytherapy who developed biochemical recurrence in 2018 (PSA 17) and was started on intermittent GnRH (Lupron 11/2018 x 5 cycles, then Eligard).          #) Low-risk prostate cancer, Cottonwood 3+3=6, cT1cN0, s/p brachytherapy, with non-metastatic biochemical recurrence 2018, on intermittent ADT  Started on intermittent ADT 11/2018 for biochemical recurrence. Intermittent ADT selected by prior oncologist to reduce severity of side effects, especially metabolic syndrome in setting of diabetes as

## 2024-10-22 ENCOUNTER — OFFICE VISIT (OUTPATIENT)
Age: 59
End: 2024-10-22
Payer: COMMERCIAL

## 2024-10-22 VITALS
SYSTOLIC BLOOD PRESSURE: 127 MMHG | RESPIRATION RATE: 18 BRPM | BODY MASS INDEX: 25.46 KG/M2 | TEMPERATURE: 98.2 F | WEIGHT: 187.7 LBS | DIASTOLIC BLOOD PRESSURE: 81 MMHG | OXYGEN SATURATION: 97 % | HEART RATE: 85 BPM

## 2024-10-22 DIAGNOSIS — C61 MALIGNANT NEOPLASM OF PROSTATE (HCC): Primary | ICD-10-CM

## 2024-10-22 DIAGNOSIS — C61 PRIMARY MALIGNANT NEOPLASM OF PROSTATE (HCC): ICD-10-CM

## 2024-10-22 LAB — PSA SERPL-MCNC: <0.1 NG/ML (ref 0–4)

## 2024-10-22 PROCEDURE — 3079F DIAST BP 80-89 MM HG: CPT | Performed by: INTERNAL MEDICINE

## 2024-10-22 PROCEDURE — 99213 OFFICE O/P EST LOW 20 MIN: CPT | Performed by: INTERNAL MEDICINE

## 2024-10-22 PROCEDURE — 3074F SYST BP LT 130 MM HG: CPT | Performed by: INTERNAL MEDICINE

## 2024-10-22 NOTE — PROGRESS NOTES
Jarod Ruth is a 59 y.o. male    Chief Complaint   Patient presents with    Follow-up     Primary malignant neoplasm of prostate        1. Have you been to the ER, urgent care clinic since your last visit?  Hospitalized since your last visit?No    2. Have you seen or consulted any other health care providers outside of the [facility] since your last visit?  Include any pap smears or colon screening. ABAD Coronel, cardiologist.

## 2024-10-24 LAB
TESTOST FREE SERPL-MCNC: 13.7 PG/ML (ref 6.6–18.1)
TESTOST SERPL-MCNC: 355 NG/DL (ref 264–916)

## 2024-10-25 DIAGNOSIS — E11.42 TYPE 2 DIABETES MELLITUS WITH DIABETIC POLYNEUROPATHY, WITHOUT LONG-TERM CURRENT USE OF INSULIN (HCC): ICD-10-CM

## 2024-10-25 DIAGNOSIS — E11.65 TYPE 2 DIABETES MELLITUS WITH HYPERGLYCEMIA, WITHOUT LONG-TERM CURRENT USE OF INSULIN (HCC): ICD-10-CM

## 2024-10-28 RX ORDER — PIOGLITAZONEHYDROCHLORIDE 30 MG/1
30 TABLET ORAL EVERY MORNING
Qty: 90 TABLET | Refills: 3 | Status: SHIPPED | OUTPATIENT
Start: 2024-10-28

## 2024-10-28 RX ORDER — ROSUVASTATIN CALCIUM 10 MG/1
10 TABLET, COATED ORAL NIGHTLY
Qty: 90 TABLET | Refills: 3 | Status: SHIPPED | OUTPATIENT
Start: 2024-10-28

## 2024-10-28 RX ORDER — GLIPIZIDE 10 MG/1
TABLET ORAL
Qty: 90 TABLET | Refills: 3 | Status: SHIPPED | OUTPATIENT
Start: 2024-10-28

## 2024-12-05 ENCOUNTER — HOSPITAL ENCOUNTER (OUTPATIENT)
Facility: HOSPITAL | Age: 59
Discharge: HOME | End: 2024-12-08
Attending: FAMILY MEDICINE
Payer: COMMERCIAL

## 2024-12-05 DIAGNOSIS — R22.30 NODULE OF SKIN OF HAND: ICD-10-CM

## 2024-12-05 PROCEDURE — 76882 US LMTD JT/FCL EVL NVASC XTR: CPT

## 2024-12-20 DIAGNOSIS — E11.65 TYPE 2 DIABETES MELLITUS WITH HYPERGLYCEMIA, WITHOUT LONG-TERM CURRENT USE OF INSULIN (HCC): ICD-10-CM

## 2024-12-20 RX ORDER — GLIPIZIDE 10 MG/1
TABLET ORAL
Qty: 90 TABLET | Refills: 3 | Status: SHIPPED | OUTPATIENT
Start: 2024-12-20

## 2024-12-20 RX ORDER — PIOGLITAZONE 30 MG/1
30 TABLET ORAL EVERY MORNING
Qty: 90 TABLET | Refills: 3 | Status: SHIPPED | OUTPATIENT
Start: 2024-12-20

## 2024-12-20 RX ORDER — ROSUVASTATIN CALCIUM 10 MG/1
10 TABLET, COATED ORAL NIGHTLY
Qty: 90 TABLET | Refills: 3 | Status: SHIPPED | OUTPATIENT
Start: 2024-12-20

## 2024-12-20 NOTE — TELEPHONE ENCOUNTER
Patient needs all 3 recent scripts to go to [facility] verses local pharmacy. Thank you

## 2025-01-10 LAB
BUN SERPL-MCNC: 19 MG/DL (ref 8–27)
BUN/CREAT SERPL: 19 (ref 10–24)
CALCIUM SERPL-MCNC: 10.2 MG/DL (ref 8.6–10.2)
CHLORIDE SERPL-SCNC: 100 MMOL/L (ref 96–106)
CHOLEST SERPL-MCNC: 142 MG/DL (ref 100–199)
CO2 SERPL-SCNC: 20 MMOL/L (ref 20–29)
CREAT SERPL-MCNC: 1.01 MG/DL (ref 0.76–1.27)
EGFRCR SERPLBLD CKD-EPI 2021: 85 ML/MIN/1.73
GLUCOSE SERPL-MCNC: 177 MG/DL (ref 70–99)
HBA1C MFR BLD: 7.7 % (ref 4.8–5.6)
HDLC SERPL-MCNC: 43 MG/DL
LDLC SERPL CALC-MCNC: 85 MG/DL (ref 0–99)
POTASSIUM SERPL-SCNC: 4.1 MMOL/L (ref 3.5–5.2)
SODIUM SERPL-SCNC: 141 MMOL/L (ref 134–144)
TRIGL SERPL-MCNC: 67 MG/DL (ref 0–149)
VLDLC SERPL CALC-MCNC: 14 MG/DL (ref 5–40)

## 2025-01-12 LAB
ALBUMIN/CREAT UR: 7 MG/G CREAT (ref 0–29)
CREAT UR-MCNC: 107.6 MG/DL
IMP & REVIEW OF LAB RESULTS: NORMAL
Lab: NORMAL
MICROALBUMIN UR-MCNC: 7.8 UG/ML

## 2025-01-14 ENCOUNTER — TELEPHONE (OUTPATIENT)
Age: 60
End: 2025-01-14

## 2025-01-14 NOTE — TELEPHONE ENCOUNTER
Called Pt. Call forwarded to MAGDALENA GREEN advising Pt of upcoming ov on 1/17 and to have labs done at least 2-3 prior. Advised Pt if he is unable to get labs to please call us to get r/s.

## 2025-01-17 ENCOUNTER — OFFICE VISIT (OUTPATIENT)
Age: 60
End: 2025-01-17

## 2025-01-17 VITALS
RESPIRATION RATE: 20 BRPM | WEIGHT: 187 LBS | SYSTOLIC BLOOD PRESSURE: 129 MMHG | TEMPERATURE: 99 F | HEIGHT: 72 IN | OXYGEN SATURATION: 96 % | HEART RATE: 94 BPM | DIASTOLIC BLOOD PRESSURE: 72 MMHG | BODY MASS INDEX: 25.33 KG/M2

## 2025-01-17 DIAGNOSIS — E11.65 TYPE 2 DIABETES MELLITUS WITH HYPERGLYCEMIA, WITHOUT LONG-TERM CURRENT USE OF INSULIN (HCC): Primary | ICD-10-CM

## 2025-01-17 DIAGNOSIS — I10 ESSENTIAL HYPERTENSION: ICD-10-CM

## 2025-01-17 DIAGNOSIS — E78.5 DYSLIPIDEMIA: ICD-10-CM

## 2025-01-17 NOTE — PROGRESS NOTES
Jarod Ruth is a 59 y.o. male here for   Chief Complaint   Patient presents with    Diabetes       1. Have you been to the ER, urgent care clinic since your last visit?  Hospitalized since your last visit? -no    2. Have you seen or consulted any other health care providers outside of the [facility] since your last visit?  Include any pap smears or colon screening.-no    
     5.  Prostate cancer -followed by oncologist          No orders of the defined types were placed in this encounter.        There are no Patient Instructions on file for this visit.      No follow-ups on file.       Spent > 40 minutes on the day of the visit reviewing chart, examining, ordering/reviewing labs, counseling, discussing therapeutics and documentation in the medical record      Thank you for allowing me to participate in the care of this patient.      Maday Atwood MD         Patient verbalized understanding

## 2025-01-20 DIAGNOSIS — E11.65 TYPE 2 DIABETES MELLITUS WITH HYPERGLYCEMIA, WITHOUT LONG-TERM CURRENT USE OF INSULIN (HCC): Primary | ICD-10-CM

## 2025-01-20 RX ORDER — DAPAGLIFLOZIN AND METFORMIN HYDROCHLORIDE 5; 1000 MG/1; MG/1
1 TABLET, FILM COATED, EXTENDED RELEASE ORAL 2 TIMES DAILY
Qty: 180 TABLET | Refills: 3 | Status: SHIPPED | OUTPATIENT
Start: 2025-01-20

## 2025-02-04 ENCOUNTER — TELEPHONE (OUTPATIENT)
Dept: PHARMACY | Facility: CLINIC | Age: 60
End: 2025-02-04

## 2025-02-04 NOTE — TELEPHONE ENCOUNTER
**Patient is [facility]**     2025 Annual Pharmacist Visit    Called patient to schedule 2025 yearly pharmacist appointment to discuss medications for Diabetes Management Program.    No answer. Left voicemail.     Please call back at [phone], option #3         [facility]  Clinical Pharmacy   Phone: [phone], option #3

## 2025-02-04 NOTE — TELEPHONE ENCOUNTER
Returned call and scheduled for 2/11/25 at 3:30pm    Nati Pires CPhT.   Population Health Clinical   [facility]  Toll free: [phone] Option 1       For Pharmacy Admin Tracking Only    Program: [employer]  CPA in place:  No  Recommendation Provided To: Patient/Caregiver: 1 via Telephone  Intervention Detail: Scheduled Appointment  Intervention Accepted By: Patient/Caregiver: 1  Gap Closed?: Yes   Time Spent (min): 5

## 2025-02-05 LAB
BASOPHILS # BLD AUTO: 0 X10E3/UL (ref 0–0.2)
BASOPHILS NFR BLD AUTO: 0 %
EOSINOPHIL # BLD AUTO: 0.2 X10E3/UL (ref 0–0.4)
EOSINOPHIL NFR BLD AUTO: 4 %
ERYTHROCYTE [DISTWIDTH] IN BLOOD BY AUTOMATED COUNT: 14.2 % (ref 11.6–15.4)
HCT VFR BLD AUTO: 45.6 % (ref 37.5–51)
HGB BLD-MCNC: 15.1 G/DL (ref 13–17.7)
IMM GRANULOCYTES # BLD AUTO: 0 X10E3/UL (ref 0–0.1)
IMM GRANULOCYTES NFR BLD AUTO: 0 %
LYMPHOCYTES # BLD AUTO: 2 X10E3/UL (ref 0.7–3.1)
LYMPHOCYTES NFR BLD AUTO: 40 %
MCH RBC QN AUTO: 29.3 PG (ref 26.6–33)
MCHC RBC AUTO-ENTMCNC: 33.1 G/DL (ref 31.5–35.7)
MCV RBC AUTO: 88 FL (ref 79–97)
MONOCYTES # BLD AUTO: 0.4 X10E3/UL (ref 0.1–0.9)
MONOCYTES NFR BLD AUTO: 7 %
NEUTROPHILS # BLD AUTO: 2.4 X10E3/UL (ref 1.4–7)
NEUTROPHILS NFR BLD AUTO: 49 %
PLATELET # BLD AUTO: 157 X10E3/UL (ref 150–450)
RBC # BLD AUTO: 5.16 X10E6/UL (ref 4.14–5.8)
WBC # BLD AUTO: 4.9 X10E3/UL (ref 3.4–10.8)

## 2025-02-06 ENCOUNTER — OFFICE VISIT (OUTPATIENT)
Age: 60
End: 2025-02-06
Payer: COMMERCIAL

## 2025-02-06 VITALS
SYSTOLIC BLOOD PRESSURE: 135 MMHG | TEMPERATURE: 98.4 F | OXYGEN SATURATION: 98 % | WEIGHT: 189.2 LBS | DIASTOLIC BLOOD PRESSURE: 83 MMHG | HEART RATE: 98 BPM | BODY MASS INDEX: 25.66 KG/M2 | RESPIRATION RATE: 19 BRPM

## 2025-02-06 DIAGNOSIS — C61 MALIGNANT NEOPLASM OF PROSTATE (HCC): Primary | ICD-10-CM

## 2025-02-06 LAB
ALBUMIN SERPL-MCNC: 4.7 G/DL (ref 3.9–4.9)
ALP SERPL-CCNC: 85 IU/L (ref 44–121)
ALT SERPL-CCNC: 15 IU/L (ref 0–44)
AST SERPL-CCNC: 13 IU/L (ref 0–40)
BILIRUB SERPL-MCNC: 0.4 MG/DL (ref 0–1.2)
BUN SERPL-MCNC: 19 MG/DL (ref 8–27)
BUN/CREAT SERPL: 18 (ref 10–24)
CALCIUM SERPL-MCNC: 9.9 MG/DL (ref 8.6–10.2)
CHLORIDE SERPL-SCNC: 102 MMOL/L (ref 96–106)
CO2 SERPL-SCNC: 21 MMOL/L (ref 20–29)
CREAT SERPL-MCNC: 1.06 MG/DL (ref 0.76–1.27)
EGFRCR SERPLBLD CKD-EPI 2021: 80 ML/MIN/1.73
GLOBULIN SER CALC-MCNC: 3 G/DL (ref 1.5–4.5)
GLUCOSE SERPL-MCNC: 152 MG/DL (ref 70–99)
POTASSIUM SERPL-SCNC: 4.5 MMOL/L (ref 3.5–5.2)
PROT SERPL-MCNC: 7.7 G/DL (ref 6–8.5)
PSA SERPL-MCNC: <0.1 NG/ML (ref 0–4)
SODIUM SERPL-SCNC: 140 MMOL/L (ref 134–144)
TESTOST SERPL-MCNC: 351 NG/DL (ref 264–916)

## 2025-02-06 PROCEDURE — 3079F DIAST BP 80-89 MM HG: CPT | Performed by: INTERNAL MEDICINE

## 2025-02-06 PROCEDURE — 3075F SYST BP GE 130 - 139MM HG: CPT | Performed by: INTERNAL MEDICINE

## 2025-02-06 PROCEDURE — 99213 OFFICE O/P EST LOW 20 MIN: CPT | Performed by: INTERNAL MEDICINE

## 2025-02-06 NOTE — PROGRESS NOTES
Jarod Ruth is a 59 y.o. male    Chief Complaint   Patient presents with    Follow-up     follow-up of prostate cancer s/p brachytherapy        1. Have you been to the ER, urgent care clinic since your last visit?  Hospitalized since your last visit?No    2. Have you seen or consulted any other health care providers outside of the [facility] since your last visit?  Include any pap smears or colon screening. No    
metabolic syndrome in setting of diabetes as well as sexual side effects.  ADT (Eligard) resumed in ~2022, although PSA undetectable at that time.    Since establishing with me in early 2023, his PSA has remained undetectable.     He presents today for follow up, currently on ADT holiday since 2/2024.  PSA today remains undetectable. He is clinically feeling well and has no major concerns. Given ongoing undetectable PSA, we will continue with ADT holiday and monitor PSA/testosterone q3 months.     - Continue ADT holiday  - Lab monitoring: PSA/testosterone q3 months  - Return to clinic in 3 months or sooner if needed     #) Diabetes Mellitus: hemoglobin A1c improving; management per diabetes care team     #) HIV: Well controlled on ART.  Follows at [facility].    #) Bone Health:  Last DEXA 9/2023 normal.  - Monitor q2 years    Thank you for involving me in the care of this patient.    Signed By: Roxi Mariee MD   2/6/2025 at 12:40 PM       Interval History:   Chart reviewed, interval events since last visit noted.     Saw Dr. Vora (ID) and Dr. Boyd (diabetes) in the interval.  He is on Xarelto per Dr. Crawford.  He has no complaints. Denies bone pains, night sweats, weight loss. He is planning on getting back in the gym.     Medications reviewed in the EMR.  Allergies   Allergen Reactions    Erythromycin Myalgia    Sulfa Antibiotics Nausea Only        Review of Systems: A 6-point review of systems was obtained, negative except as reviewed in the HPI.    Family History:  Mother - kidney cancer  1 brother - no history of prostate cancer  Multiple male cousins with prostate cancer      Social History: Never smoker. 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  Lives in [place].  He is a .       Physical Exam:   /83   Pulse 98   Temp 98.4 °F (36.9 °C)   Resp 19   Wt 85.8 kg (189 lb 3.2 oz)   SpO2 98%   BMI 25.66 kg/m²   General: No distress, pleasant  Eyes: Anicteric sclerae  Chest: gynecomastia

## 2025-02-11 ENCOUNTER — TELEPHONE (OUTPATIENT)
Dept: PHARMACY | Facility: CLINIC | Age: 60
End: 2025-02-11

## 2025-02-11 NOTE — TELEPHONE ENCOUNTER
indicated if LDL >70 mg/dL. (ACC 2018).  LDL Target goal: <70mg/dL- Aged 40-76 yo with Diabetes   Recommendation:Continue current statin therapy.   - Recommended Immunizations:  Up to date on most recommended CDC vaccinations. Could consider dose of RSV vaccine    PLAN:  - DM program gaps identified:   Requirements recommended to be completed by 7/1: Requirements met   Requirements due by 12/31: Provider visit for DM (2nd), A1c (2nd), and Influenza vaccination for upcoming season  - Education to patient: Discussed general issues about diabetes pathophysiology and management., Addressed medication adherence, Overview of Diabetes program- Benefits/Requirements, and Overview of [facility]   - Follow up: PCP for identified gaps or as scheduled below and Endocrinologist for identified gaps or as scheduled below    - Upcoming appointments:   Future Appointments   Date Time Provider Department Center   5/6/2025 11:20 AM Niya Campos APRN - CNP MEDONC [facility]   5/21/2025  2:45 PM Maday Atwood MD DIABENDO BS [facility]       W. Bridger Reyez, PharmD, BCACP  Ambulatory Care Clinical Pharmacist- [facility] Clinical Pharmacy  Department, toll free: [phone]    For Pharmacy Admin Tracking Only    Program: [facility]  CPA in place:  No  Gap Closed?: Yes   Time Spent (min): 30

## 2025-02-20 DIAGNOSIS — E11.65 TYPE 2 DIABETES MELLITUS WITH HYPERGLYCEMIA, WITHOUT LONG-TERM CURRENT USE OF INSULIN (HCC): ICD-10-CM

## 2025-02-20 RX ORDER — SEMAGLUTIDE 1.34 MG/ML
INJECTION, SOLUTION SUBCUTANEOUS
Qty: 9 ML | Refills: 3 | Status: SHIPPED | OUTPATIENT
Start: 2025-02-20

## 2025-03-03 DIAGNOSIS — E11.65 TYPE 2 DIABETES MELLITUS WITH HYPERGLYCEMIA, WITHOUT LONG-TERM CURRENT USE OF INSULIN (HCC): Primary | ICD-10-CM

## 2025-03-03 RX ORDER — LANCETS 28 GAUGE
EACH MISCELLANEOUS
Qty: 200 EACH | Refills: 3 | Status: SHIPPED | OUTPATIENT
Start: 2025-03-03

## 2025-03-03 RX ORDER — BLOOD SUGAR DIAGNOSTIC
STRIP MISCELLANEOUS
Qty: 200 STRIP | Refills: 3 | Status: SHIPPED | OUTPATIENT
Start: 2025-03-03

## 2025-04-28 ENCOUNTER — TELEPHONE (OUTPATIENT)
Age: 60
End: 2025-04-28

## 2025-04-28 NOTE — TELEPHONE ENCOUNTER
Called spoke with Pt. Verified ID x2. Asked if Pt was able to come in for his ov with Deann on 5/5 rather than 5/6. Pt stated that will work. Pt wants to keep same time at 11:20am. Also reminded Pt to have labs done 2-3 days prior. Pt stated he will walk-in to [facility]. Faxed order to [facility].

## 2025-04-30 LAB
BASOPHILS # BLD AUTO: 0 X10E3/UL (ref 0–0.2)
BASOPHILS NFR BLD AUTO: 0 %
EOSINOPHIL # BLD AUTO: 0.2 X10E3/UL (ref 0–0.4)
EOSINOPHIL NFR BLD AUTO: 5 %
ERYTHROCYTE [DISTWIDTH] IN BLOOD BY AUTOMATED COUNT: 14.2 % (ref 11.6–15.4)
HCT VFR BLD AUTO: 44.8 % (ref 37.5–51)
HGB BLD-MCNC: 15 G/DL (ref 13–17.7)
IMM GRANULOCYTES # BLD AUTO: 0 X10E3/UL (ref 0–0.1)
IMM GRANULOCYTES NFR BLD AUTO: 0 %
LYMPHOCYTES # BLD AUTO: 2.1 X10E3/UL (ref 0.7–3.1)
LYMPHOCYTES NFR BLD AUTO: 45 %
MCH RBC QN AUTO: 29.5 PG (ref 26.6–33)
MCHC RBC AUTO-ENTMCNC: 33.5 G/DL (ref 31.5–35.7)
MCV RBC AUTO: 88 FL (ref 79–97)
MONOCYTES # BLD AUTO: 0.3 X10E3/UL (ref 0.1–0.9)
MONOCYTES NFR BLD AUTO: 7 %
NEUTROPHILS # BLD AUTO: 2 X10E3/UL (ref 1.4–7)
NEUTROPHILS NFR BLD AUTO: 43 %
PLATELET # BLD AUTO: 155 X10E3/UL (ref 150–450)
RBC # BLD AUTO: 5.09 X10E6/UL (ref 4.14–5.8)
WBC # BLD AUTO: 4.7 X10E3/UL (ref 3.4–10.8)

## 2025-04-30 NOTE — PROGRESS NOTES
[facility] [place]  Medical Oncology   [phone]    Hematology / Oncology Follow-up    Reason for Visit:   Jarod Ruth is a 60 y.o. male who is seen for follow-up of prostate cancer s/p brachytherapy with biochemical recurrence in 2018 (PSA 17), currently on ADT holiday.      Oncology History:   2009: diagnosed with low-risk prostate cancer.  PSA 5.  Prostate biopsy with prostate adenocarcinoma with Lester 3+3 = 6, [id number].  Treated with brachytherapy.  2014 PSA 0.52  1/2017 PSA increased to 3.59.  Prostate biopsy was repeated and was negative for malignancy  8/2018 PSA 17.  Started on intermittent GnRH injection for biochemical recurrence.  Lupron started 11/2018  10/18/18 prostate MRI: no evidence of local recurrence  10/11/2018: small focus in R upper posterior femoral diaphysis, which is probably a metastasis.  Suggest radiograph of femur.    10/22/18 CT femur: not felt to be a metastasis  10/11/2018 CT CAP and bone scan: no evidence of metastatic disease  3/2/20 PSA <0.1  5/16/21 PSA <0.1  3/22/22 PSA <0.1, testosterone 428  Switched to Eligard at some point.  Per notes, C3 Eligard 3/25/22  3/24/23: Established with me. PSA <0.1, testosterone 412.  Eligard resumed.    9/2023: PSA 0, testosterone 12.   2/29/24: ADT Holiday initiated   7/15/24: PSA <0.1, testosterone 299  10/21/24: PSA <0.1  2/5/25: PSA <0.1  4/30/25: PSA < 0.1     Assessment:     Mr. Ruth is 60 y.o. man with a history of low-risk cT1cN0 prostate cancer (Mateus 3+3=6, PSA 5, diagnosed 2009) s/p brachytherapy who developed biochemical recurrence in 2018 (PSA 17) and was started on intermittent GnRH (Lupron 11/2018 x 5 cycles, then Eligard).          #) Low-risk prostate cancer, Mateus 3+3=6, cT1cN0, s/p brachytherapy, with non-metastatic biochemical recurrence 2018, on intermittent ADT  Started on intermittent ADT 11/2018 for biochemical recurrence. Intermittent ADT selected by prior oncologist to reduce severity of side

## 2025-05-01 LAB
ALBUMIN SERPL-MCNC: 4.5 G/DL (ref 3.9–4.9)
ALP SERPL-CCNC: 79 IU/L (ref 44–121)
ALT SERPL-CCNC: 13 IU/L (ref 0–44)
AST SERPL-CCNC: 18 IU/L (ref 0–40)
BILIRUB SERPL-MCNC: 0.3 MG/DL (ref 0–1.2)
BUN SERPL-MCNC: 14 MG/DL (ref 8–27)
BUN/CREAT SERPL: 16 (ref 10–24)
CALCIUM SERPL-MCNC: 9.6 MG/DL (ref 8.6–10.2)
CHLORIDE SERPL-SCNC: 101 MMOL/L (ref 96–106)
CO2 SERPL-SCNC: 22 MMOL/L (ref 20–29)
CREAT SERPL-MCNC: 0.89 MG/DL (ref 0.76–1.27)
EGFRCR SERPLBLD CKD-EPI 2021: 97 ML/MIN/1.73
GLOBULIN SER CALC-MCNC: 2.8 G/DL (ref 1.5–4.5)
GLUCOSE SERPL-MCNC: 182 MG/DL (ref 70–99)
POTASSIUM SERPL-SCNC: 4.2 MMOL/L (ref 3.5–5.2)
PROT SERPL-MCNC: 7.3 G/DL (ref 6–8.5)
PSA SERPL-MCNC: <0.1 NG/ML (ref 0–4)
SODIUM SERPL-SCNC: 139 MMOL/L (ref 134–144)
TESTOST FREE SERPL-MCNC: 12.8 PG/ML (ref 6.6–18.1)
TESTOST SERPL-MCNC: 407 NG/DL (ref 264–916)

## 2025-05-03 LAB — HBA1C MFR BLD HPLC: 7.5 %

## 2025-05-05 ENCOUNTER — OFFICE VISIT (OUTPATIENT)
Age: 60
End: 2025-05-05
Payer: COMMERCIAL

## 2025-05-05 VITALS
DIASTOLIC BLOOD PRESSURE: 73 MMHG | TEMPERATURE: 98.2 F | OXYGEN SATURATION: 98 % | BODY MASS INDEX: 24.82 KG/M2 | SYSTOLIC BLOOD PRESSURE: 112 MMHG | RESPIRATION RATE: 18 BRPM | WEIGHT: 183 LBS | HEART RATE: 96 BPM

## 2025-05-05 DIAGNOSIS — C61 MALIGNANT NEOPLASM OF PROSTATE (HCC): ICD-10-CM

## 2025-05-05 DIAGNOSIS — C61 MALIGNANT NEOPLASM OF PROSTATE (HCC): Primary | ICD-10-CM

## 2025-05-05 PROCEDURE — 3074F SYST BP LT 130 MM HG: CPT

## 2025-05-05 PROCEDURE — 99213 OFFICE O/P EST LOW 20 MIN: CPT

## 2025-05-05 PROCEDURE — 3078F DIAST BP <80 MM HG: CPT

## 2025-05-05 NOTE — PROGRESS NOTES
Jarod Ruth is a 60 y.o. male    Chief Complaint   Patient presents with    Follow-up     Primary malignant neoplasm of prostate       1. Have you been to the ER, urgent care clinic since your last visit?  Hospitalized since your last visit?No    2. Have you seen or consulted any other health care providers outside of the [facility] since your last visit?  Include any pap smears or colon screening. No

## 2025-05-06 DIAGNOSIS — C61 MALIGNANT NEOPLASM OF PROSTATE (HCC): ICD-10-CM

## 2025-07-23 ENCOUNTER — HOSPITAL ENCOUNTER (EMERGENCY)
Facility: HOSPITAL | Age: 60
Discharge: HOME | End: 2025-07-23
Attending: EMERGENCY MEDICINE
Payer: COMMERCIAL

## 2025-07-23 ENCOUNTER — APPOINTMENT (OUTPATIENT)
Facility: HOSPITAL | Age: 60
End: 2025-07-23
Payer: COMMERCIAL

## 2025-07-23 ENCOUNTER — NURSE TRIAGE (OUTPATIENT)
Dept: OTHER | Facility: CLINIC | Age: 60
End: 2025-07-23

## 2025-07-23 VITALS
RESPIRATION RATE: 18 BRPM | WEIGHT: 180 LBS | OXYGEN SATURATION: 98 % | DIASTOLIC BLOOD PRESSURE: 106 MMHG | SYSTOLIC BLOOD PRESSURE: 125 MMHG | BODY MASS INDEX: 24.38 KG/M2 | HEIGHT: 72 IN | HEART RATE: 99 BPM | TEMPERATURE: 98.2 F

## 2025-07-23 DIAGNOSIS — Z04.9 SUSPECTED CONDITION NOT FOUND: Primary | ICD-10-CM

## 2025-07-23 PROCEDURE — 99283 EMERGENCY DEPT VISIT LOW MDM: CPT

## 2025-07-23 PROCEDURE — 73060 X-RAY EXAM OF HUMERUS: CPT

## 2025-07-23 NOTE — DISCHARGE INSTRUCTIONS
As discussed today, x-ray did not show any evidence of a foreign body in the upper extremity.  Monitor signs and symptoms and follow-up with PCP.

## 2025-07-23 NOTE — ED PROVIDER NOTES
[facility] EMERGENCY DEPARTMENT  EMERGENCY DEPARTMENT ENCOUNTER      Pt Name: Jarod Ruth  MRN: [id number]  Birthdate 1965  Date of evaluation: 7/23/2025  Provider: MELINDA Samule    CHIEF COMPLAINT       Chief Complaint   Patient presents with    Foreign Body in Arm         HISTORY OF PRESENT ILLNESS   (Location/Symptom, Timing/Onset, Context/Setting, Quality, Duration, Modifying Factors, Severity)  Note limiting factors.   Jarod Ruth is a 60 y.o. male who presents to the emergency department complaining of having a Dexcom needle in his left upper arm.  Patient reports his Dexcom was reading inaccurately and therefore he tried to remove it.  He reports when he removed the needle was no longer into the Dexcom.  He reports having some mild pain to the posterior upper left arm.              Review of External Medical Records:     Nursing Notes were reviewed.    REVIEW OF SYSTEMS    (2-9 systems for level 4, 10 or more for level 5)     Review of Systems    Except as noted above the remainder of the review of systems was reviewed and negative.       PAST MEDICAL HISTORY     Past Medical History:   Diagnosis Date    HIV (human immunodeficiency virus infection) (HCC)     Prostate cancer (HCC)     Type II diabetes mellitus, uncontrolled          SURGICAL HISTORY       Past Surgical History:   Procedure Laterality Date    CATARACT REMOVAL      COLONOSCOPY N/A 7/20/2023    COLONOSCOPY DIAGNOSTIC performed by Tony Gould MD at [facility] ENDOSCOPY    PROSTATECTOMY           CURRENT MEDICATIONS       Previous Medications    AMMONIUM LACTATE (AMLACTIN) 12 % CREAM    APPLY TOPICALLY AND RUB WELL INTO AFFECTED AREA 2 TIMES A DAY    ASPIRIN 81 MG EC TABLET    Take 1 tablet by mouth daily    BICTEGRAVIR-EMTRICITAB-TENOFOVIR ALAFENAMIDE (BIKTARVY) -25 MG TABS PER TABLET    Take 1 tablet by mouth daily    BLOOD GLUCOSE TEST STRIPS (PRODIGY NO CODING BLOOD GLUC) STRIP    USE TO CHECK BG TWICE DAILY. DX CODE E11.65

## 2025-07-23 NOTE — TELEPHONE ENCOUNTER
Reason for Disposition   Needlestick from used or discarded injection needle  (Exception: Clean, unused needle.)    Answer Assessment - Initial Assessment Questions  1. LOCATION: \"Where is the puncture located?\"       Left tricep   2. OBJECT: \"What was the object that punctured the skin?\"       Needle Dexacom   3. DEPTH: \"How deep do you think the puncture goes?\"       Unsure as it is stuck   4. ONSET: \"When did the injury occur?\" (Minutes or hours)      Today approximately 12:00 /1 pm   5. PAIN: \"Is it painful?\" If Yes, ask: \"How bad is the pain?\"  (Scale 1-10; or mild, moderate, severe)      3/10 constant   6. TETANUS: \"When was the last tetanus booster?\"      2024   7. PREGNANCY: \"Is there any chance you are pregnant?\" \"When was your last menstrual period?\"      N/A    Protocols used: Puncture Wound-ADULT-OH  Location of patient: [place]     Location of employment: Home is where it occurred.     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     Location of injury (body part involved): left tricep     Time of injury: 12:00 p.m     Last 4 of SSN: [id number]     Location associate referred to for care: [facility] ED.  RN advised patient to discuss with front staff at [facility] in case he needs to go somewhere else instead of this location for ED       Subjective: Caller states \"{Patient was changing his Dexacom and the needle seems to be struck in the arm \"     Current Symptoms: Dexacom needle is stuck .     Pain Severity: 3/10; pinching ; constant    Temperature: afebrile     What has been tried: Attempt to remove         Recommended disposition: Go to ED Now        Care advice provided, caller verbalizes understanding; denies any other questions or concerns.

## 2025-07-29 ENCOUNTER — TELEPHONE (OUTPATIENT)
Age: 60
End: 2025-07-29

## 2025-07-29 ENCOUNTER — OFFICE VISIT (OUTPATIENT)
Age: 60
End: 2025-07-29
Payer: COMMERCIAL

## 2025-07-29 VITALS
WEIGHT: 183.9 LBS | HEART RATE: 95 BPM | HEIGHT: 72 IN | SYSTOLIC BLOOD PRESSURE: 123 MMHG | OXYGEN SATURATION: 97 % | TEMPERATURE: 99.1 F | DIASTOLIC BLOOD PRESSURE: 76 MMHG | BODY MASS INDEX: 24.91 KG/M2

## 2025-07-29 DIAGNOSIS — E11.65 TYPE 2 DIABETES MELLITUS WITH HYPERGLYCEMIA, WITHOUT LONG-TERM CURRENT USE OF INSULIN (HCC): Primary | ICD-10-CM

## 2025-07-29 DIAGNOSIS — I10 ESSENTIAL HYPERTENSION: ICD-10-CM

## 2025-07-29 DIAGNOSIS — E78.5 DYSLIPIDEMIA: ICD-10-CM

## 2025-07-29 PROCEDURE — 95251 CONT GLUC MNTR ANALYSIS I&R: CPT | Performed by: INTERNAL MEDICINE

## 2025-07-29 PROCEDURE — 3078F DIAST BP <80 MM HG: CPT | Performed by: INTERNAL MEDICINE

## 2025-07-29 PROCEDURE — 3051F HG A1C>EQUAL 7.0%<8.0%: CPT | Performed by: INTERNAL MEDICINE

## 2025-07-29 PROCEDURE — 99214 OFFICE O/P EST MOD 30 MIN: CPT | Performed by: INTERNAL MEDICINE

## 2025-07-29 PROCEDURE — 3074F SYST BP LT 130 MM HG: CPT | Performed by: INTERNAL MEDICINE

## 2025-07-29 NOTE — PROGRESS NOTES
Jarod Ruth is a 60 y.o. male here for   Chief Complaint   Patient presents with    Diabetes       1. Have you been to the ER, urgent care clinic since your last visit?  Hospitalized since your last visit? - C for Dex-Cam needle stuck 07/23/25    2. Have you seen or consulted any other health care providers outside of the [facility] since your last visit?  Include any pap smears or colon screening.- [facility]

## 2025-07-29 NOTE — TELEPHONE ENCOUNTER
Called Pt. No answer. M reminder of upoming OV on 8/5 and need to have labs done at least 2-3 days prior. Advised Pt to call back if any issues or concerns, and advised lab slip sent to .

## 2025-07-29 NOTE — PROGRESS NOTES
[facility] DIABETES AND ENDOCRINOLOGY                 Maday Atwood MD            Patient Information   Name : Jarod Ruth   YOB: 1965           PCP: Estrella Patel MD         The patient (or guardian, if applicable) and other individuals in attendance with the patient were advised that Artificial Intelligence will be utilized during this visit to record, process the conversation to generate a clinical note, and support improvement of the AI technology. The patient (or guardian, if applicable) and other individuals in attendance at the appointment consented to the use of AI, including the recording.           Chief Complaint   Patient presents with    Diabetes       Jarod Ruth is here  for fu of  Type 2 Diabetes Mellitus which was diagnosed in 2013.   History of Present Illness  The patient presents for diabetes evaluation.    Diabetes Management  -  monitors  blood glucose levels using a Dexcom continuous glucose monitor, with a recent reading of 6.5%.  - Dietary modifications include reducing the intake of sweets and incorporating fresh fruits such as apples, while avoiding oranges and grapes.  - has experienced episodes of hypoglycemia, with blood glucose levels dropping to 50 mg/dL on Dexcom, though she remains asymptomatic during these episodes.            Prior hx   Has tried josé luis 3 sample,  Insurance did not cover     Seen podiatry -for onychomycosis,        Oncologist is Dr Mehta,         Physical Examination:      General: pleasant, no distress, good eye contact    HEENT: no pallor, no periorbital edema, EOMI    Neck: supple,     Cardiovascular: regular, normal rate, normal S1 and S2,     Respiratory: clear to auscultation bilaterally        Neurological: alert and oriented    Psychiatric: normal mood and affect    Skin: color, texture, turgor normal.       Diabetic foot exam ; September 2024      H/o partial or complete amputation of foot : No   H/o previous foot

## 2025-08-02 LAB — PSA SERPL-MCNC: <0.1 NG/ML (ref 0–4)

## 2025-08-03 LAB
TESTOST FREE SERPL-MCNC: 11.1 PG/ML (ref 6.6–18.1)
TESTOST SERPL-MCNC: 429 NG/DL (ref 264–916)

## 2025-08-05 ENCOUNTER — OFFICE VISIT (OUTPATIENT)
Age: 60
End: 2025-08-05
Payer: COMMERCIAL

## 2025-08-05 ENCOUNTER — CLINICAL DOCUMENTATION (OUTPATIENT)
Dept: PHARMACY | Facility: CLINIC | Age: 60
End: 2025-08-05

## 2025-08-05 VITALS
WEIGHT: 183 LBS | BODY MASS INDEX: 24.82 KG/M2 | TEMPERATURE: 98.6 F | RESPIRATION RATE: 18 BRPM | DIASTOLIC BLOOD PRESSURE: 82 MMHG | HEART RATE: 90 BPM | OXYGEN SATURATION: 95 % | SYSTOLIC BLOOD PRESSURE: 125 MMHG

## 2025-08-05 DIAGNOSIS — Z79.899 ENCOUNTER FOR BONE DENSITY MEASUREMENT FOR THERAPEUTIC DRUG MONITORING: ICD-10-CM

## 2025-08-05 DIAGNOSIS — C61 MALIGNANT NEOPLASM OF PROSTATE (HCC): Primary | ICD-10-CM

## 2025-08-05 DIAGNOSIS — Z01.89 ENCOUNTER FOR BONE DENSITY MEASUREMENT FOR THERAPEUTIC DRUG MONITORING: ICD-10-CM

## 2025-08-05 PROCEDURE — 99213 OFFICE O/P EST LOW 20 MIN: CPT | Performed by: INTERNAL MEDICINE

## 2025-08-05 PROCEDURE — 3074F SYST BP LT 130 MM HG: CPT | Performed by: INTERNAL MEDICINE

## 2025-08-05 PROCEDURE — 3079F DIAST BP 80-89 MM HG: CPT | Performed by: INTERNAL MEDICINE

## (undated) DEVICE — LINE SAMP LNG AD ORAL NSL PT O2 TBNG SMRT CAPNOLN H +

## (undated) DEVICE — FORCEPS BX L240CM JAW DIA2.4MM ORNG L CAP W/ NDL DISP RAD

## (undated) DEVICE — MASK O2 MED AD 7 FT 3 IN 1 W/ STD CONN LTX

## (undated) DEVICE — KIT PRECLEANING BS ENDOSCP

## (undated) DEVICE — MASK ANES INF SZ 2 PREM TAIL VLV INFL PRT UNSCENTED SGL PT